# Patient Record
Sex: FEMALE | Race: BLACK OR AFRICAN AMERICAN
[De-identification: names, ages, dates, MRNs, and addresses within clinical notes are randomized per-mention and may not be internally consistent; named-entity substitution may affect disease eponyms.]

---

## 2017-05-12 ENCOUNTER — HOSPITAL ENCOUNTER (INPATIENT)
Dept: HOSPITAL 62 - ER | Age: 57
LOS: 12 days | Discharge: NURSING, REHAB OR LONG-TERM CARE | DRG: 391 | End: 2017-05-24
Attending: INTERNAL MEDICINE | Admitting: INTERNAL MEDICINE
Payer: MEDICARE

## 2017-05-12 DIAGNOSIS — Z79.02: ICD-10-CM

## 2017-05-12 DIAGNOSIS — M06.9: ICD-10-CM

## 2017-05-12 DIAGNOSIS — I50.9: ICD-10-CM

## 2017-05-12 DIAGNOSIS — I11.0: ICD-10-CM

## 2017-05-12 DIAGNOSIS — M32.9: ICD-10-CM

## 2017-05-12 DIAGNOSIS — R07.9: ICD-10-CM

## 2017-05-12 DIAGNOSIS — G93.40: ICD-10-CM

## 2017-05-12 DIAGNOSIS — D64.9: ICD-10-CM

## 2017-05-12 DIAGNOSIS — Z79.899: ICD-10-CM

## 2017-05-12 DIAGNOSIS — E78.1: ICD-10-CM

## 2017-05-12 DIAGNOSIS — A08.4: Primary | ICD-10-CM

## 2017-05-12 DIAGNOSIS — G47.33: ICD-10-CM

## 2017-05-12 DIAGNOSIS — Z79.84: ICD-10-CM

## 2017-05-12 DIAGNOSIS — E11.9: ICD-10-CM

## 2017-05-12 DIAGNOSIS — Z79.891: ICD-10-CM

## 2017-05-12 DIAGNOSIS — Z91.81: ICD-10-CM

## 2017-05-12 DIAGNOSIS — F32.9: ICD-10-CM

## 2017-05-12 DIAGNOSIS — Z83.3: ICD-10-CM

## 2017-05-12 DIAGNOSIS — Z88.6: ICD-10-CM

## 2017-05-12 DIAGNOSIS — R00.0: ICD-10-CM

## 2017-05-12 DIAGNOSIS — E78.5: ICD-10-CM

## 2017-05-12 DIAGNOSIS — E66.01: ICD-10-CM

## 2017-05-12 DIAGNOSIS — R65.10: ICD-10-CM

## 2017-05-12 DIAGNOSIS — H92.02: ICD-10-CM

## 2017-05-12 DIAGNOSIS — G89.29: ICD-10-CM

## 2017-05-12 DIAGNOSIS — Z92.25: ICD-10-CM

## 2017-05-12 DIAGNOSIS — Z82.49: ICD-10-CM

## 2017-05-12 DIAGNOSIS — R59.0: ICD-10-CM

## 2017-05-12 DIAGNOSIS — E86.0: ICD-10-CM

## 2017-05-12 DIAGNOSIS — K50.90: ICD-10-CM

## 2017-05-12 DIAGNOSIS — M79.7: ICD-10-CM

## 2017-05-12 DIAGNOSIS — Z86.73: ICD-10-CM

## 2017-05-12 LAB
ALBUMIN SERPL-MCNC: 4.6 G/DL (ref 3.5–5)
ALP SERPL-CCNC: 83 U/L (ref 38–126)
ALT SERPL-CCNC: 30 U/L (ref 9–52)
ANION GAP SERPL CALC-SCNC: 18 MMOL/L (ref 5–19)
APPEARANCE ALL TUBES: CLEAR
APPEARANCE ALL TUBES: CLEAR
APPEARANCE TUBE1 CSF: CLEAR
APPEARANCE TUBE1 CSF: CLEAR
APPEARANCE TUBE2 CSF: CLEAR
APPEARANCE TUBE2 CSF: CLEAR
APPEARANCE TUBE3 CSF: CLEAR
APPEARANCE TUBE3 CSF: CLEAR
APPEARANCE UR: (no result)
AST SERPL-CCNC: 17 U/L (ref 14–36)
BARBITURATES UR QL SCN: NEGATIVE
BASOPHILS # BLD AUTO: 0.1 10^3/UL (ref 0–0.2)
BASOPHILS NFR BLD AUTO: 0.6 % (ref 0–2)
BILIRUB DIRECT SERPL-MCNC: 0.6 MG/DL (ref 0–0.4)
BILIRUB SERPL-MCNC: 1 MG/DL (ref 0.2–1.3)
BILIRUB UR QL STRIP: NEGATIVE
BUN SERPL-MCNC: 4 MG/DL (ref 7–20)
CALCIUM: 10.4 MG/DL (ref 8.4–10.2)
CHLORIDE SERPL-SCNC: 102 MMOL/L (ref 98–107)
CK MB SERPL-MCNC: 0.28 NG/ML (ref ?–4.55)
CK SERPL-CCNC: 54 U/L (ref 30–135)
CO2 SERPL-SCNC: 24 MMOL/L (ref 22–30)
CREAT SERPL-MCNC: 0.74 MG/DL (ref 0.52–1.25)
EOSINOPHIL # BLD AUTO: 0 10^3/UL (ref 0–0.6)
EOSINOPHIL NFR BLD AUTO: 0 % (ref 0–6)
ERYTHROCYTE [DISTWIDTH] IN BLOOD BY AUTOMATED COUNT: 17.7 % (ref 11.5–14)
GLUCOSE CSF-MCNC: 111 MG/DL (ref 40–70)
GLUCOSE SERPL-MCNC: 174 MG/DL (ref 75–110)
GLUCOSE UR STRIP-MCNC: NEGATIVE MG/DL
HCT VFR BLD CALC: 39.2 % (ref 36–47)
HGB BLD-MCNC: 12.5 G/DL (ref 12–15.5)
HGB HCT DIFFERENCE: -1.7
KETONES UR STRIP-MCNC: 80 MG/DL
LYMPHOCYTES # BLD AUTO: 2.3 10^3/UL (ref 0.5–4.7)
LYMPHOCYTES NFR BLD AUTO: 22 % (ref 13–45)
MCH RBC QN AUTO: 24.1 PG (ref 27–33.4)
MCHC RBC AUTO-ENTMCNC: 32 G/DL (ref 32–36)
MCV RBC AUTO: 75 FL (ref 80–97)
METHADONE UR QL SCN: NEGATIVE
MONOCYTES # BLD AUTO: 1.3 10^3/UL (ref 0.1–1.4)
MONOCYTES NFR BLD AUTO: 11.8 % (ref 3–13)
NEUTROPHILS # BLD AUTO: 7 10^3/UL (ref 1.7–8.2)
NEUTS SEG NFR BLD AUTO: 65.6 % (ref 42–78)
NITRITE UR QL STRIP: NEGATIVE
PCP UR QL SCN: NEGATIVE
PH UR STRIP: 5 [PH] (ref 5–9)
POTASSIUM SERPL-SCNC: 3.6 MMOL/L (ref 3.6–5)
PROT SERPL-MCNC: 8.6 G/DL (ref 6.3–8.2)
PROT UR STRIP-MCNC: 100 MG/DL
RBC # BLD AUTO: 5.21 10^6/UL (ref 3.72–5.28)
RBC AVERAGE: 0
RBC AVERAGE: 93
RBC DILUENT USED: (no result)
RBC DILUENT USED: (no result)
RBC DILUTION FACTOR: 1
RBC DILUTION FACTOR: 1
RBC SIDE 1: 0
RBC SIDE 1: 98
RBC SIDE 2: 0
RBC SIDE 2: 88
SODIUM SERPL-SCNC: 143.7 MMOL/L (ref 137–145)
SP GR UR STRIP: 1.02
TOTAL RBC SQUARES COUNTED: 225
TOTAL RBC SQUARES COUNTED: 225
TROPONIN I SERPL-MCNC: 0.04 NG/ML
URINE OPIATES LOW: (no result)
UROBILINOGEN UR-MCNC: NEGATIVE MG/DL (ref ?–2)
WBC # BLD AUTO: 10.6 10^3/UL (ref 4–10.5)

## 2017-05-12 PROCEDURE — 71010: CPT

## 2017-05-12 PROCEDURE — 93017 CV STRESS TEST TRACING ONLY: CPT

## 2017-05-12 PROCEDURE — 85652 RBC SED RATE AUTOMATED: CPT

## 2017-05-12 PROCEDURE — 78452 HT MUSCLE IMAGE SPECT MULT: CPT

## 2017-05-12 PROCEDURE — 93005 ELECTROCARDIOGRAM TRACING: CPT

## 2017-05-12 PROCEDURE — 96375 TX/PRO/DX INJ NEW DRUG ADDON: CPT

## 2017-05-12 PROCEDURE — A9500 TC99M SESTAMIBI: HCPCS

## 2017-05-12 PROCEDURE — 87045 FECES CULTURE AEROBIC BACT: CPT

## 2017-05-12 PROCEDURE — G0378 HOSPITAL OBSERVATION PER HR: HCPCS

## 2017-05-12 PROCEDURE — 83735 ASSAY OF MAGNESIUM: CPT

## 2017-05-12 PROCEDURE — 80053 COMPREHEN METABOLIC PANEL: CPT

## 2017-05-12 PROCEDURE — 82272 OCCULT BLD FECES 1-3 TESTS: CPT

## 2017-05-12 PROCEDURE — 96365 THER/PROPH/DIAG IV INF INIT: CPT

## 2017-05-12 PROCEDURE — 009U3ZX DRAINAGE OF SPINAL CANAL, PERCUTANEOUS APPROACH, DIAGNOSTIC: ICD-10-PCS | Performed by: EMERGENCY MEDICINE

## 2017-05-12 PROCEDURE — 87040 BLOOD CULTURE FOR BACTERIA: CPT

## 2017-05-12 PROCEDURE — 96361 HYDRATE IV INFUSION ADD-ON: CPT

## 2017-05-12 PROCEDURE — 82550 ASSAY OF CK (CPK): CPT

## 2017-05-12 PROCEDURE — 87205 SMEAR GRAM STAIN: CPT

## 2017-05-12 PROCEDURE — 85025 COMPLETE CBC W/AUTO DIFF WBC: CPT

## 2017-05-12 PROCEDURE — 87070 CULTURE OTHR SPECIMN AEROBIC: CPT

## 2017-05-12 PROCEDURE — 80061 LIPID PANEL: CPT

## 2017-05-12 PROCEDURE — 84484 ASSAY OF TROPONIN QUANT: CPT

## 2017-05-12 PROCEDURE — 84157 ASSAY OF PROTEIN OTHER: CPT

## 2017-05-12 PROCEDURE — 70450 CT HEAD/BRAIN W/O DYE: CPT

## 2017-05-12 PROCEDURE — 84443 ASSAY THYROID STIM HORMONE: CPT

## 2017-05-12 PROCEDURE — 76882 US LMTD JT/FCL EVL NVASC XTR: CPT

## 2017-05-12 PROCEDURE — 82962 GLUCOSE BLOOD TEST: CPT

## 2017-05-12 PROCEDURE — 80307 DRUG TEST PRSMV CHEM ANLYZR: CPT

## 2017-05-12 PROCEDURE — 93306 TTE W/DOPPLER COMPLETE: CPT

## 2017-05-12 PROCEDURE — 89050 BODY FLUID CELL COUNT: CPT

## 2017-05-12 PROCEDURE — 80185 ASSAY OF PHENYTOIN TOTAL: CPT

## 2017-05-12 PROCEDURE — 74177 CT ABD & PELVIS W/CONTRAST: CPT

## 2017-05-12 PROCEDURE — 36415 COLL VENOUS BLD VENIPUNCTURE: CPT

## 2017-05-12 PROCEDURE — 82945 GLUCOSE OTHER FLUID: CPT

## 2017-05-12 PROCEDURE — 93010 ELECTROCARDIOGRAM REPORT: CPT

## 2017-05-12 PROCEDURE — 99292 CRITICAL CARE ADDL 30 MIN: CPT

## 2017-05-12 PROCEDURE — 99291 CRITICAL CARE FIRST HOUR: CPT

## 2017-05-12 PROCEDURE — 81001 URINALYSIS AUTO W/SCOPE: CPT

## 2017-05-12 PROCEDURE — 82553 CREATINE MB FRACTION: CPT

## 2017-05-12 PROCEDURE — 87493 C DIFF AMPLIFIED PROBE: CPT

## 2017-05-12 NOTE — ER DOCUMENT REPORT
ED General





- General


Stated Complaint: ALTERED MENTAL STATUS


Time Seen by Provider: 17 15:26


Mode of Arrival: Medic


Information source: Emergency Med Personnel, [facility] Records


Notes: 


This is a 56-year-old female with a history of CVA, obstructive sleep apnea, 

diabetes, rheumatoid arthritis, diabetes, CHF acute kidney injury, 

rhabdomyolysis.  The patient is brought into the emergency room by EMS because 

of an altered mental status for 2 hours.  EMS reports that home health was at 

the house and that said that the patient was at her baseline until 

approximately 2 hours ago when she became lethargic.  EMS states that the 

patient is actually improved somewhat during transport from when they saw the 

patient.


TRAVEL OUTSIDE OF THE [place] IN LAST 30 DAYS: No





- HPI


Onset: Just prior to arrival


Onset/Duration: Sudden


Quality of pain: No pain


Severity: None


Pain Level: Denies


Associated symptoms: Nausea, Vomiting


Exacerbated by: Denies


Relieved by: Denies


Similar symptoms previously: Yes


Recently seen / treated by doctor: No





- Related Data


Allergies/Adverse Reactions: 


 





ibuprofen [Ibuprofen] Allergy (Severe, Verified 16 14:54)


 Chest pain











Past Medical History





- General


Information source: Transfer Record





- Social History


Smoking Status: Never Smoker


Cigarette use (# per day): No


Chew tobacco use (# tins/day): No


Frequency of alcohol use: None


Drug Abuse: None


Lives with: Alone


Family History: DM, Hypertension


Patient has suicidal ideation: No


Patient has homicidal ideation: No





- Past Medical History


Cardiac Medical History: Reports: Hx Hypercholesterolemia, Hx Hypertension, Hx 

Heart Murmur


   Denies: Hx Atrial Fibrillation, Hx Congestive Heart Failure, Hx Coronary 

Artery Disease, Hx Heart Attack, Hx Peripheral Vascular Disease, Hx Pulmonary 

Embolism


Pulmonary Medical History: Reports: Hx Asthma, Hx Bronchitis, Hx Pneumonia, Hx 

Sleep Apnea


   Denies: Hx COPD, Hx Respiratory Failure, Hx Tuberculosis


Neurological Medical History: Reports: Hx Cerebrovascular Accident - 2004.  

Denies: Hx Seizures


Endocrine Medical History: Reports: Hx Diabetes Mellitus Type 2.  Denies: Hx 

Graves' Disease, Hx Hyperthyroidism, Hx Hypothyroidism


Malignancy Medical History: Denies: Hx Leukemia, Hx Lung Cancer


GI Medical History: Reports: Hx Crohn's Disease, Hx Hiatal Hernia.  Denies: Hx 

Gastroesophageal Reflux Disease, Hx Irritable Bowel, Hx Liver Failure, Hx Ulcer


Musculoskeltal Medical History: Reports Hx Arthritis, Reports Hx Fibromyalgia, 

Denies Hx Multiple Sclerosis, Denies Hx Muscular Dystrophy


Psychiatric Medical History: Reports: Hx Depression


   Denies: Hx Bipolar Disorder, Hx Dementia, Hx Post Traumatic Stress Disorder, 

Hx Schizophrenia


Traumatic Medical History: Denies: Hx Fractures


Infectious Medical History: Denies: Hx HIV


Past Surgical History: Reports: Hx  Section, Hx Hysterectomy.  Denies: 

Hx Appendectomy, Hx Bowel Surgery, Hx Cholecystectomy, Hx Colostomy, Hx 

Coronary Artery Bypass Graft, Hx Gastric Bypass Surgery, Hx Herniorrhaphy, Hx 

Mastectomy, Hx Pacemaker, Hx Tonsillectomy, Hx Tubal Ligation





- Immunizations


Immunizations up to date: Yes


Hx Diphtheria, Pertussis, Tetanus Vaccination: Yes


Hx Pneumococcal Vaccination: 08





Review of Systems





- Review of Systems


Constitutional: denies: Chills, Fever


EENT: No symptoms reported


Cardiovascular: No symptoms reported


Respiratory: No symptoms reported


Gastrointestinal: No symptoms reported


Genitourinary: No symptoms reported


Female Genitourinary: No symptoms reported


Musculoskeletal: No symptoms reported


Skin: No symptoms reported


Hematologic/Lymphatic: No symptoms reported


Neurological/Psychological: See HPI





Physical Exam





- Vital signs


Vitals: 


 











BP


 


 156/108 H


 


 17 14:55











Notes: 


Physical exam:


 


GENERAL: 56-year-old female lying in stretcher, appears lethargic but arousable 

and answering questions.  Blood pressure is 158/90, pulse is 109, O2 sat is 100

% on room air, respiratory rate is 19, rectal temperature is 99.8


HEAD: Atraumatic, normocephalic.


EYES: Pupils equal round and reactive to light, extraocular movements intact, 

sclera anicteric, conjunctiva are normal.


ENT: TMs normal, nares patent, oropharynx clear without exudates.  Moist mucous 

membranes.


NECK: Normal range of motion, supple without lymphadenopathy or JVD.


LUNGS: Breath sounds clear to auscultation bilaterally and equal.  No wheezes 

rales or rhonchi.


HEART: Regular rate and rhythm without murmurs, rubs or gallops.


ABDOMEN: Soft, normoactive bowel sounds.  No tenderness to palpation.  No 

guarding, no rebound.  No masses appreciated.


Back: Clear


Rectal: Brown stool, sent for study


EXTREMITIES: Normal range of motion, no pitting or edema.  No clubbing or 

cyanosis.


NEUROLOGICAL: Cranial nerves II through XII grossly intact.  Normal speech, 

moving all extremities, 2 superficial bruises on the left mid tibia.  No 

evidence of cellulitis.


PSYCH: Normal mood, normal affect.


SKIN: Warm, Dry, normal turgor, no rashes or lesions noted.





Course





- Re-evaluation


Re-evalutation: 


17 18:00


Called to see patient because of worsening mental status.  Patient's heart rate 

noted to be 145.  His appear to be sinus tach on monitor.  Recheck rectal 

temperature and is 101.2.  Chest x-ray is clear, O2 sat is 100% on room air, UA 

is negative.





17 22:27


Note: This is a 56-year-old female with a complicated medical history who is 

immunosuppressed because of her medicines for rheumatoid arthritis (methotrexate

, prednisone) who was brought to the emergency room with acute mental status 

changes.  The patient was known to be tachycardic and her heart rate went up as 

much is 145 and was in sinus at that time.  She did become febrile in the 

emergency room with a rectal temperature of 101.2 she wasn't cephalopathic.  CT 

of the head was clear, chest x-ray was clear, urine analysis was negative.  She 

did have some lesions on her lower extremities that were marked so that it take 

could be followed clinically for any expansion.  The patient does have a 

history of falling, so these may just be bruises.  CT of the abdomen showed no 

acute process.  A spinal tap was performed because of concerns for meningitis 

and is thus far looks negative for infection.  Blood cultures, urine cultures 

and spinal fluid cultures have been sent.  Patient has been given IV fluids, IV 

antibiotics and antiemetics.  Her mental status has improved somewhat but she 

is not fully at baseline.  At this point, there is no obvious source of 

infection.  She does meet criteria for SIRS.  She does not meet establish 

criteria for sepsis at this point.





17 22:31








17 00:02








- Vital Signs


Vital signs: 


 











Temp Pulse Resp BP Pulse Ox


 


       14   133/87 H  100 


 


       17 23:00  17 22:31  17 23:00














- Laboratory


Result Diagrams: 


 17 17:18





 17 17:18


Laboratory results interpreted by me: 


 











  17





  16:27 17:18 17:18


 


WBC   10.6 H 


 


MCV   75 L 


 


MCH   24.1 L 


 


RDW   17.7 H 


 


BUN    4 L


 


Glucose    174 H


 


POC Glucose   


 


Calcium    10.4 H


 


Direct Bilirubin    0.6 H


 


Total Protein    8.6 H


 


Urine Protein  100 H  


 


Urine Ketones  80 H  


 


CSF Glucose   














  17





  17:39 19:05


 


WBC  


 


MCV  


 


MCH  


 


RDW  


 


BUN  


 


Glucose  


 


POC Glucose  192 H 


 


Calcium  


 


Direct Bilirubin  


 


Total Protein  


 


Urine Protein  


 


Urine Ketones  


 


CSF Glucose   111 H














- Diagnostic Test


Radiology reviewed: Image reviewed, Reports reviewed - CT of the head shows no 

acute process. Chest x-ray shows no infiltrates. CT of the abdomen shows no 

acute process.





- EKG Interpretation by Me


Rate: Tachycardia - EKG shows sinus tachycardia with a ventricular rate of 114, 

no acute ST-T wave changes





Procedures





- Lumbar Puncture


  ** Lumbar puncture


Time completed: 20:30


Consent obtained: No - procedure was done emergently to rule out meningitis


Lumbar puncture pre-procedure: Chloraprep applied


Patient position: Sitting


Needle size: 20


Lumbar puncture location: L4-5


Anesthetic type: 1% Lidocaine


mL's of anesthetic: 4


Amount/type of drainage: 4 mL


Number of attempts: 3


Complications: No


Notes: 








Note: Given the patient's acute encephalopathy (Marked mental status changes), 

fever and tachycardia and lack of obvious source of infection (i.e. no pneumonia

, UTI); the spinal tap was done emergently (i.e.  Without written informed 

consent).  The patient was agreeable at the time for the procedure.  The fluid 

looks clear which is good.  It is been sent for study.  





Critical Care Note





- Critical Care Note


Total time excluding time spent on procedures (mins): 90





Discharge





- Discharge


Clinical Impression: 


 acute encephalopathy, SIRS, vomiting





Condition: Stable


Disposition: ADMITTED AS INPATIENT


Admitting Provider: Hospitalist - Dr. Mcclendon


Unit Admitted: Telemetry

## 2017-05-13 RX ADMIN — MAGNESIUM SULFATE IN DEXTROSE SCH ML: 10 INJECTION, SOLUTION INTRAVENOUS at 04:09

## 2017-05-13 RX ADMIN — CELECOXIB SCH MG: 200 CAPSULE ORAL at 11:14

## 2017-05-13 RX ADMIN — CELECOXIB SCH MG: 200 CAPSULE ORAL at 18:12

## 2017-05-13 RX ADMIN — ATORVASTATIN CALCIUM SCH MG: 20 TABLET, FILM COATED ORAL at 18:12

## 2017-05-13 RX ADMIN — METFORMIN HYDROCHLORIDE SCH MG: 500 TABLET, FILM COATED ORAL at 18:11

## 2017-05-13 RX ADMIN — SILVER SULFADIAZINE SCH APPLIC: 10 CREAM TOPICAL at 18:14

## 2017-05-13 RX ADMIN — FUROSEMIDE SCH MG: 80 TABLET ORAL at 11:13

## 2017-05-13 RX ADMIN — FAMOTIDINE SCH MG: 20 TABLET, FILM COATED ORAL at 22:25

## 2017-05-13 RX ADMIN — FOLIC ACID SCH MG: 1 TABLET ORAL at 11:12

## 2017-05-13 RX ADMIN — AMLODIPINE BESYLATE SCH MG: 5 TABLET ORAL at 11:14

## 2017-05-13 RX ADMIN — IPRATROPIUM BROMIDE AND ALBUTEROL SCH PUFF: 20; 100 SPRAY, METERED RESPIRATORY (INHALATION) at 18:14

## 2017-05-13 RX ADMIN — DULOXETINE SCH MG: 30 CAPSULE, DELAYED RELEASE ORAL at 11:16

## 2017-05-13 RX ADMIN — DICYCLOMINE HYDROCHLORIDE SCH MG: 20 TABLET ORAL at 18:00

## 2017-05-13 RX ADMIN — PRENATAL W/O A VIT W/ FE FUMARATE-FA CAP 106.5-1 MG SCH CAP: 106.5 CAPSULE ORAL at 11:16

## 2017-05-13 RX ADMIN — LISINOPRIL SCH MG: 5 TABLET ORAL at 11:15

## 2017-05-13 RX ADMIN — CLOPIDOGREL BISULFATE SCH MG: 75 TABLET, FILM COATED ORAL at 11:16

## 2017-05-13 RX ADMIN — SILVER SULFADIAZINE SCH APPLIC: 10 CREAM TOPICAL at 19:57

## 2017-05-13 RX ADMIN — POTASSIUM CHLORIDE SCH MEQ: 750 TABLET, FILM COATED, EXTENDED RELEASE ORAL at 11:16

## 2017-05-13 RX ADMIN — SPIRONOLACTONE SCH MG: 25 TABLET, FILM COATED ORAL at 11:15

## 2017-05-13 RX ADMIN — PREGABALIN SCH MG: 75 CAPSULE ORAL at 11:15

## 2017-05-13 RX ADMIN — SILVER SULFADIAZINE SCH APPLIC: 10 CREAM TOPICAL at 10:00

## 2017-05-13 RX ADMIN — PREGABALIN SCH MG: 75 CAPSULE ORAL at 22:25

## 2017-05-13 RX ADMIN — MONTELUKAST SODIUM SCH MG: 10 TABLET, FILM COATED ORAL at 18:12

## 2017-05-13 RX ADMIN — METOPROLOL TARTRATE SCH MG: 25 TABLET, FILM COATED ORAL at 22:25

## 2017-05-13 RX ADMIN — EXTENDED PHENYTOIN SODIUM SCH MG: 100 CAPSULE ORAL at 11:15

## 2017-05-13 RX ADMIN — DICYCLOMINE HYDROCHLORIDE SCH MG: 20 TABLET ORAL at 11:17

## 2017-05-13 RX ADMIN — DULOXETINE SCH MG: 30 CAPSULE, DELAYED RELEASE ORAL at 22:26

## 2017-05-13 RX ADMIN — MAGNESIUM SULFATE IN DEXTROSE SCH ML: 10 INJECTION, SOLUTION INTRAVENOUS at 02:39

## 2017-05-13 RX ADMIN — ASPIRIN 325 MG ORAL TABLET SCH MG: 325 PILL ORAL at 11:14

## 2017-05-13 RX ADMIN — CETIRIZINE HYDROCHLORIDE SCH MG: 10 TABLET, FILM COATED ORAL at 18:12

## 2017-05-13 RX ADMIN — DICYCLOMINE HYDROCHLORIDE SCH MG: 20 TABLET ORAL at 14:30

## 2017-05-13 RX ADMIN — EXTENDED PHENYTOIN SODIUM SCH MG: 100 CAPSULE ORAL at 22:25

## 2017-05-13 RX ADMIN — IPRATROPIUM BROMIDE AND ALBUTEROL SCH PUFF: 20; 100 SPRAY, METERED RESPIRATORY (INHALATION) at 12:00

## 2017-05-13 NOTE — PDOC PROGRESS REPORT
Subjective


Progress Note for:: 05/13/17


Subjective:: 


Patient seen on morning rounds.  She is presently sitting in bedside chair.  

She states her stomach doesn't feel well.  She states she's been nauseated 

since yesterday.  She denies any vomiting, she has had several episodes of 

diarrhea.  She denies any recent antibiotic use at home.  She states she has no 

other family members were sick at the present time.  She denies any significant 

abdominal pain at the present time.  She denies any chest pain, shortness 

breath or dyspnea.  She has had a low-grade fever on and off.  She is back to 

her baseline mental status.  Rest of review systems is negative.





Physical Exam


Vital Signs: 


 











Temp Pulse Resp BP Pulse Ox


 


 99.3 F   136 H  26 H  157/86 H  97 


 


 05/13/17 07:53  05/13/17 07:53  05/13/17 07:53  05/13/17 07:53  05/13/17 07:53








 Intake & Output











 05/12/17 05/13/17 05/14/17





 06:59 06:59 06:59


 


Intake Total  170 


 


Balance  170 


 


Weight  137.6 kg 











General appearance: PRESENT: no acute distress, morbidly obese, well-developed, 

well-nourished


Head exam: PRESENT: atraumatic, normocephalic


Eye exam: PRESENT: conjunctiva pink, EOMI, PERRLA.  ABSENT: scleral icterus


Ear exam: PRESENT: normal external ear exam


Neck exam: ABSENT: carotid bruit, JVD, lymphadenopathy, thyromegaly


Respiratory exam: PRESENT: clear to auscultation kaley.  ABSENT: rales, rhonchi, 

wheezes


Cardiovascular exam: PRESENT: RRR.  ABSENT: diastolic murmur, rubs, systolic 

murmur


Vascular exam: PRESENT: normal capillary refill


GI/Abdominal exam: PRESENT: normal bowel sounds, soft.  ABSENT: distended, 

guarding, mass, organolmegaly, rebound, tenderness


Rectal exam: PRESENT: deferred


Extremities exam: PRESENT: full ROM.  ABSENT: calf tenderness, clubbing, pedal 

edema


Musculoskeletal exam: PRESENT: full ROM, normal inspection, tenderness


Neurological exam: PRESENT: alert, awake, oriented to person, oriented to place

, oriented to time, oriented to situation, CN II-XII grossly intact.  ABSENT: 

motor sensory deficit


Psychiatric exam: PRESENT: appropriate affect, normal mood.  ABSENT: homicidal 

ideation, suicidal ideation


Skin exam: PRESENT: dry, intact, warm.  ABSENT: cyanosis, rash





Results


Impressions: 


 





Head CT  05/12/17 00:00


IMPRESSION:  NORMAL BRAIN CT WITHOUT CONTRAST.


 








Chest X-Ray  05/12/17 15:37


IMPRESSION:  NO ACUTE RADIOGRAPHIC FINDING IN THE CHEST.


 








Abdomen/Pelvis CT  05/12/17 21:06


IMPRESSION:  NO ACUTE FINDINGS WITHIN THE ABDOMEN OR PELVIS.  NO SIGNIFICANT 

CHANGE FROM PRIOR STUDY.


 














Assessment & Plan





- Diagnosis


(1) Encephalopathy acute


Is this a current diagnosis for this admission?: YesPlan: 


Since resolved.  He PE secondary to infectious process versus chronic use of 

opioid analgesics











(3) Chronic prescription opiate use


Is this a current diagnosis for this admission?: YesPlan: 


Continue pain management prescribed analgesic dose








(4) Diabetes mellitus


Qualifiers: 


     Diabetes mellitus type: type 2     Diabetes mellitus complication status: 

with unspecified complications     Diabetes mellitus long term insulin use: 

unspecified long term insulin use status        Qualified Code(s): E11.8 - Type 

2 diabetes mellitus with unspecified complications; Z79.4 - Long term (current) 

use of insulin  





(5) Morbid obesity with BMI of 50.0-59.9, adult


Is this a current diagnosis for this admission?: YesPlan: 


Counseled








(6) GOSIA (obstructive sleep apnea)


Is this a current diagnosis for this admission?: YesPlan: 


CPAP at night











- Time


Time Spent with patient: 25-34 minutes


Critical Time spent with patient: 15-24 minutes


Medications reviewed and adjusted accordingly: Yes


Anticipated discharge: Home with Homehealth


Within: within 24 hours





- Inpatient Certification


Based on my medical assessment, after consideration of the patient's 

comorbidities, presenting symptoms, or acuity I expect that the services needed 

warrant INPATIENT care.: Yes


I certify that my determination is in accordance with my understanding of 

Medicare's requirements for reasonable and necessary INPATIENT services [42 CFR 

412.3e].: Yes

## 2017-05-13 NOTE — EKG REPORT
SEVERITY:- ABNORMAL ECG -

SINUS TACHYCARDIA

PROBABLE POSTERIOR INFARCT

BORDERLINE PROLONGED QT INTERVAL

:

Confirmed by: Dez Fabian 13-May-2017 13:18:29

## 2017-05-14 RX ADMIN — IPRATROPIUM BROMIDE AND ALBUTEROL SCH PUFF: 20; 100 SPRAY, METERED RESPIRATORY (INHALATION) at 11:16

## 2017-05-14 RX ADMIN — KETOROLAC TROMETHAMINE PRN MG: 10 TABLET, FILM COATED ORAL at 17:29

## 2017-05-14 RX ADMIN — EXTENDED PHENYTOIN SODIUM SCH MG: 100 CAPSULE ORAL at 09:10

## 2017-05-14 RX ADMIN — EXTENDED PHENYTOIN SODIUM SCH MG: 100 CAPSULE ORAL at 14:10

## 2017-05-14 RX ADMIN — LISINOPRIL SCH MG: 5 TABLET ORAL at 09:12

## 2017-05-14 RX ADMIN — POTASSIUM CHLORIDE SCH MEQ: 750 TABLET, FILM COATED, EXTENDED RELEASE ORAL at 09:11

## 2017-05-14 RX ADMIN — ASPIRIN 325 MG ORAL TABLET SCH MG: 325 PILL ORAL at 09:10

## 2017-05-14 RX ADMIN — IPRATROPIUM BROMIDE AND ALBUTEROL SCH PUFF: 20; 100 SPRAY, METERED RESPIRATORY (INHALATION) at 17:28

## 2017-05-14 RX ADMIN — EXTENDED PHENYTOIN SODIUM SCH MG: 100 CAPSULE ORAL at 23:16

## 2017-05-14 RX ADMIN — SILVER SULFADIAZINE SCH APPLIC: 10 CREAM TOPICAL at 09:24

## 2017-05-14 RX ADMIN — IPRATROPIUM BROMIDE AND ALBUTEROL SCH PUFF: 20; 100 SPRAY, METERED RESPIRATORY (INHALATION) at 23:16

## 2017-05-14 RX ADMIN — FUROSEMIDE SCH MG: 80 TABLET ORAL at 09:27

## 2017-05-14 RX ADMIN — SPIRONOLACTONE SCH MG: 25 TABLET, FILM COATED ORAL at 09:12

## 2017-05-14 RX ADMIN — METFORMIN HYDROCHLORIDE SCH MG: 500 TABLET, FILM COATED ORAL at 09:10

## 2017-05-14 RX ADMIN — PREDNISONE SCH MG: 10 TABLET ORAL at 09:11

## 2017-05-14 RX ADMIN — METOPROLOL SUCCINATE SCH MG: 50 TABLET, EXTENDED RELEASE ORAL at 23:15

## 2017-05-14 RX ADMIN — METFORMIN HYDROCHLORIDE SCH MG: 500 TABLET, FILM COATED ORAL at 17:27

## 2017-05-14 RX ADMIN — MONTELUKAST SODIUM SCH MG: 10 TABLET, FILM COATED ORAL at 17:28

## 2017-05-14 RX ADMIN — DULOXETINE SCH MG: 30 CAPSULE, DELAYED RELEASE ORAL at 23:15

## 2017-05-14 RX ADMIN — CLOPIDOGREL BISULFATE SCH MG: 75 TABLET, FILM COATED ORAL at 09:09

## 2017-05-14 RX ADMIN — DULOXETINE SCH MG: 30 CAPSULE, DELAYED RELEASE ORAL at 09:11

## 2017-05-14 RX ADMIN — DICYCLOMINE HYDROCHLORIDE SCH MG: 20 TABLET ORAL at 13:18

## 2017-05-14 RX ADMIN — METOPROLOL TARTRATE SCH MG: 25 TABLET, FILM COATED ORAL at 09:22

## 2017-05-14 RX ADMIN — IPRATROPIUM BROMIDE AND ALBUTEROL SCH PUFF: 20; 100 SPRAY, METERED RESPIRATORY (INHALATION) at 01:26

## 2017-05-14 RX ADMIN — CETIRIZINE HYDROCHLORIDE SCH MG: 10 TABLET, FILM COATED ORAL at 17:28

## 2017-05-14 RX ADMIN — IPRATROPIUM BROMIDE AND ALBUTEROL SCH PUFF: 20; 100 SPRAY, METERED RESPIRATORY (INHALATION) at 05:21

## 2017-05-14 RX ADMIN — DICYCLOMINE HYDROCHLORIDE SCH MG: 20 TABLET ORAL at 09:13

## 2017-05-14 RX ADMIN — PREGABALIN SCH MG: 75 CAPSULE ORAL at 23:16

## 2017-05-14 RX ADMIN — SILVER SULFADIAZINE SCH APPLIC: 10 CREAM TOPICAL at 17:23

## 2017-05-14 RX ADMIN — CELECOXIB SCH MG: 200 CAPSULE ORAL at 09:10

## 2017-05-14 RX ADMIN — FOLIC ACID SCH MG: 1 TABLET ORAL at 09:10

## 2017-05-14 RX ADMIN — PREGABALIN SCH MG: 75 CAPSULE ORAL at 09:10

## 2017-05-14 RX ADMIN — PRENATAL W/O A VIT W/ FE FUMARATE-FA CAP 106.5-1 MG SCH CAP: 106.5 CAPSULE ORAL at 09:07

## 2017-05-14 RX ADMIN — DICYCLOMINE HYDROCHLORIDE SCH MG: 20 TABLET ORAL at 17:28

## 2017-05-14 RX ADMIN — FAMOTIDINE SCH MG: 20 TABLET, FILM COATED ORAL at 23:16

## 2017-05-14 RX ADMIN — SILVER SULFADIAZINE SCH APPLIC: 10 CREAM TOPICAL at 13:18

## 2017-05-14 RX ADMIN — ATORVASTATIN CALCIUM SCH MG: 20 TABLET, FILM COATED ORAL at 17:28

## 2017-05-14 RX ADMIN — DEXAMETHASONE SODIUM PHOSPHATE PRN MG: 10 INJECTION INTRAMUSCULAR; INTRAVENOUS at 11:32

## 2017-05-14 NOTE — PDOC PROGRESS REPORT
Subjective


Progress Note for:: 05/14/17


Subjective:: 


Patient seen on morning rounds.  She is sitting on the side of the bed eating 

breakfast.   She states her stomach doesn't feel well.  She states she's been 

nauseated since yesterday.  She denies any vomiting, she states she has had 

several episodes of diarrhea.  She denies any recent antibiotic use at home.  

She states she has no other family members were sick at the present time.  She 

denies any significant abdominal pain at the present time.  She denies any 

chest pain, shortness breath or dyspnea.  She has had a low-grade fever on and 

off. Her heart rate has been elevated this morning in the 140's with systolic 

blood pressure in the 90's. She states she just does not feel well.  She is 

back to her baseline mental status.  Rest of review systems is negative.





Physical Exam


Vital Signs: 


 











Temp Pulse Resp BP Pulse Ox


 


 99.4 F   132 H  18   142/86 H  96 


 


 05/14/17 03:49  05/14/17 11:51  05/14/17 11:51  05/14/17 11:51  05/14/17 11:51








 Intake & Output











 05/13/17 05/14/17 05/15/17





 06:59 06:59 06:59


 


Intake Total 170 491 


 


Output Total  630 


 


Balance 170 -139 


 


Weight 137.6 kg  














Results


Impressions: 


 





Head CT  05/12/17 00:00


IMPRESSION:  NORMAL BRAIN CT WITHOUT CONTRAST.


 








Chest X-Ray  05/12/17 15:37


IMPRESSION:  NO ACUTE RADIOGRAPHIC FINDING IN THE CHEST.


 








Abdomen/Pelvis CT  05/12/17 21:06


IMPRESSION:  NO ACUTE FINDINGS WITHIN THE ABDOMEN OR PELVIS.  NO SIGNIFICANT 

CHANGE FROM PRIOR STUDY.


 














Assessment & Plan





- Diagnosis


(1) SIRS (systemic inflammatory response syndrome)


Is this a current diagnosis for this admission?: YesPlan: 


No obvious source of infection, may be viral gastroenteritis








(2) Tachycardia


Plan: 


Patient has been borderline hypotensive, blood pressure medications have been 

held or given in a reduced dosage, most likely secondary to dehydration from 

diarrhea. Gently rehydrating with IV fluids. Will check stool for Cdiff and 

other organisms. Patient given digoxin 0.5 mg IV and fluid bolus. Will hold 

other BP med but give metoprolol. 








(3) Encephalopathy acute


Is this a current diagnosis for this admission?: YesPlan: 





Since resolved.  This may be secondary to infectious process versus chronic use 

of opioid analgesics








(4) Nausea


Is this a current diagnosis for this admission?: YesPlan: 


Improved since yesterday,








(5) Chronic prescription opiate use


Is this a current diagnosis for this admission?: YesPlan: 


Continue pain management prescribed analgesic dose








(6) Diabetes mellitus


Qualifiers: 


     Diabetes mellitus type: type 2     Diabetes mellitus complication status: 

with unspecified complications     Diabetes mellitus long term insulin use: 

unspecified long term insulin use status        Qualified Code(s): E11.8 - Type 

2 diabetes mellitus with unspecified complications; Z79.4 - Long term (current) 

use of insulin  


Is this a current diagnosis for this admission?: YesPlan: 


Continue current home medication and sliding scale coverage








(7) Morbid obesity with BMI of 50.0-59.9, adult


Is this a current diagnosis for this admission?: YesPlan: 


Counseled








(8) GOSIA (obstructive sleep apnea)


Is this a current diagnosis for this admission?: YesPlan: 


CPAP at night








(9) Diarrhea


Qualifiers: 


     Diarrhea type: unspecified type        Qualified Code(s): R19.7 - Diarrhea

, unspecified  


Is this a current diagnosis for this admission?: YesPlan: 


Will send stool for cdiff and culture











- Time


Time Spent with patient: 25-34 minutes


Smoking Cessation Education: 3 to 10 minutes


Medications reviewed and adjusted accordingly: Yes


Anticipated discharge: Home with Homehealth

## 2017-05-15 RX ADMIN — MONTELUKAST SODIUM SCH MG: 10 TABLET, FILM COATED ORAL at 18:00

## 2017-05-15 RX ADMIN — SILVER SULFADIAZINE SCH APPLIC: 10 CREAM TOPICAL at 18:00

## 2017-05-15 RX ADMIN — FUROSEMIDE SCH MG: 80 TABLET ORAL at 10:44

## 2017-05-15 RX ADMIN — SILVER SULFADIAZINE SCH APPLIC: 10 CREAM TOPICAL at 15:31

## 2017-05-15 RX ADMIN — KETOROLAC TROMETHAMINE PRN MG: 10 TABLET, FILM COATED ORAL at 20:05

## 2017-05-15 RX ADMIN — PREDNISONE SCH MG: 10 TABLET ORAL at 10:44

## 2017-05-15 RX ADMIN — PREGABALIN SCH MG: 75 CAPSULE ORAL at 10:41

## 2017-05-15 RX ADMIN — IPRATROPIUM BROMIDE AND ALBUTEROL SCH PUFF: 20; 100 SPRAY, METERED RESPIRATORY (INHALATION) at 12:52

## 2017-05-15 RX ADMIN — METOPROLOL SUCCINATE SCH MG: 50 TABLET, EXTENDED RELEASE ORAL at 10:43

## 2017-05-15 RX ADMIN — POTASSIUM CHLORIDE SCH MEQ: 750 TABLET, FILM COATED, EXTENDED RELEASE ORAL at 10:40

## 2017-05-15 RX ADMIN — IPRATROPIUM BROMIDE AND ALBUTEROL SCH PUFF: 20; 100 SPRAY, METERED RESPIRATORY (INHALATION) at 18:11

## 2017-05-15 RX ADMIN — SILVER SULFADIAZINE SCH APPLIC: 10 CREAM TOPICAL at 10:46

## 2017-05-15 RX ADMIN — CLOPIDOGREL BISULFATE SCH MG: 75 TABLET, FILM COATED ORAL at 10:41

## 2017-05-15 RX ADMIN — CETIRIZINE HYDROCHLORIDE SCH MG: 10 TABLET, FILM COATED ORAL at 17:59

## 2017-05-15 RX ADMIN — PRENATAL W/O A VIT W/ FE FUMARATE-FA CAP 106.5-1 MG SCH CAP: 106.5 CAPSULE ORAL at 10:40

## 2017-05-15 RX ADMIN — PREGABALIN SCH MG: 75 CAPSULE ORAL at 22:11

## 2017-05-15 RX ADMIN — IPRATROPIUM BROMIDE AND ALBUTEROL SCH PUFF: 20; 100 SPRAY, METERED RESPIRATORY (INHALATION) at 05:39

## 2017-05-15 RX ADMIN — FAMOTIDINE SCH MG: 20 TABLET, FILM COATED ORAL at 22:11

## 2017-05-15 RX ADMIN — ATORVASTATIN CALCIUM SCH MG: 20 TABLET, FILM COATED ORAL at 18:00

## 2017-05-15 RX ADMIN — EXTENDED PHENYTOIN SODIUM SCH MG: 100 CAPSULE ORAL at 05:38

## 2017-05-15 RX ADMIN — DULOXETINE SCH MG: 30 CAPSULE, DELAYED RELEASE ORAL at 22:11

## 2017-05-15 RX ADMIN — EXTENDED PHENYTOIN SODIUM SCH MG: 100 CAPSULE ORAL at 22:10

## 2017-05-15 RX ADMIN — METOPROLOL SUCCINATE SCH MG: 50 TABLET, EXTENDED RELEASE ORAL at 22:10

## 2017-05-15 RX ADMIN — DICYCLOMINE HYDROCHLORIDE SCH MG: 20 TABLET ORAL at 18:00

## 2017-05-15 RX ADMIN — SPIRONOLACTONE SCH MG: 25 TABLET, FILM COATED ORAL at 10:42

## 2017-05-15 RX ADMIN — METFORMIN HYDROCHLORIDE SCH MG: 500 TABLET, FILM COATED ORAL at 10:41

## 2017-05-15 RX ADMIN — AMLODIPINE BESYLATE SCH MG: 5 TABLET ORAL at 10:43

## 2017-05-15 RX ADMIN — ASPIRIN 325 MG ORAL TABLET SCH MG: 325 PILL ORAL at 10:41

## 2017-05-15 RX ADMIN — METFORMIN HYDROCHLORIDE SCH MG: 500 TABLET, FILM COATED ORAL at 15:33

## 2017-05-15 RX ADMIN — DICYCLOMINE HYDROCHLORIDE SCH MG: 20 TABLET ORAL at 10:46

## 2017-05-15 RX ADMIN — FOLIC ACID SCH MG: 1 TABLET ORAL at 10:41

## 2017-05-15 RX ADMIN — ALBUTEROL SULFATE PRN PUFF: 90 AEROSOL, METERED RESPIRATORY (INHALATION) at 18:06

## 2017-05-15 RX ADMIN — DEXAMETHASONE SODIUM PHOSPHATE PRN MG: 10 INJECTION INTRAMUSCULAR; INTRAVENOUS at 17:59

## 2017-05-15 RX ADMIN — DICYCLOMINE HYDROCHLORIDE SCH MG: 20 TABLET ORAL at 15:31

## 2017-05-15 RX ADMIN — DULOXETINE SCH MG: 30 CAPSULE, DELAYED RELEASE ORAL at 10:41

## 2017-05-15 RX ADMIN — EXTENDED PHENYTOIN SODIUM SCH MG: 100 CAPSULE ORAL at 15:31

## 2017-05-15 RX ADMIN — KETOROLAC TROMETHAMINE PRN MG: 10 TABLET, FILM COATED ORAL at 05:38

## 2017-05-15 NOTE — PDOC PROGRESS REPORT
Subjective


Progress Note for:: 05/15/17


Subjective:: 


Patient seen on morning rounds.  She is sitting in bedside chair eating 

breakfast.  Her daughter is at the bedside as well.  She states she feels 

better than she did yesterday.  She's had no further nausea or vomiting.  She 

has had 3 episodes of diarrhea.  She has had no further tachycardia or 

hypotension.  She states she does not think she is able to go home with home 

health.  She thinks she needs to go to rehabilitation for further physical 

therapy.  Rest of review systems is negative.





Physical Exam


Vital Signs: 


 











Temp Pulse Resp BP Pulse Ox


 


 99.1 F   89   19   121/64   98 


 


 05/15/17 04:01  05/15/17 04:01  05/15/17 04:01  05/15/17 04:01  05/15/17 04:01








 Intake & Output











 05/14/17 05/15/17 05/16/17





 06:59 06:59 06:59


 


Intake Total  1235 


 


Balance  1235 











General appearance: PRESENT: no acute distress, morbidly obese, well-developed, 

well-nourished


Head exam: PRESENT: atraumatic, normocephalic


Eye exam: PRESENT: conjunctiva pink, EOMI, PERRLA.  ABSENT: scleral icterus


Ear exam: PRESENT: normal external ear exam


Mouth exam: PRESENT: moist, tongue midline


Neck exam: ABSENT: carotid bruit, JVD, lymphadenopathy, thyromegaly


Respiratory exam: PRESENT: clear to auscultation kaley.  ABSENT: rales, rhonchi, 

wheezes


Cardiovascular exam: PRESENT: RRR.  ABSENT: diastolic murmur, rubs, systolic 

murmur


Pulses: PRESENT: normal dorsalis pedis pul


Vascular exam: PRESENT: normal capillary refill


GI/Abdominal exam: PRESENT: normal bowel sounds, soft.  ABSENT: distended, 

guarding, mass, organolmegaly, rebound, tenderness


Rectal exam: PRESENT: deferred


Extremities exam: PRESENT: full ROM.  ABSENT: calf tenderness, clubbing, pedal 

edema


Musculoskeletal exam: PRESENT: ambulatory, full ROM, normal inspection


Neurological exam: PRESENT: alert, awake, oriented to person, oriented to place

, oriented to time, oriented to situation, CN II-XII grossly intact.  ABSENT: 

motor sensory deficit


Psychiatric exam: PRESENT: flat affect


Skin exam: PRESENT: abrasion





Results


Impressions: 


 





Head CT  05/12/17 00:00


IMPRESSION:  NORMAL BRAIN CT WITHOUT CONTRAST.


 








Chest X-Ray  05/12/17 15:37


IMPRESSION:  NO ACUTE RADIOGRAPHIC FINDING IN THE CHEST.


 








Abdomen/Pelvis CT  05/12/17 21:06


IMPRESSION:  NO ACUTE FINDINGS WITHIN THE ABDOMEN OR PELVIS.  NO SIGNIFICANT 

CHANGE FROM PRIOR STUDY.


 














Assessment & Plan





- Diagnosis


(1) SIRS (systemic inflammatory response syndrome)


Is this a current diagnosis for this admission?: YesPlan: 





Tachycardia, low grade fever and hypotension likely secondary to viral 

gastroenteritis








(2) Tachycardia


Plan: 


Resolved








(3) Encephalopathy acute


Is this a current diagnosis for this admission?: YesPlan: 


Resolved








(4) Nausea


Is this a current diagnosis for this admission?: YesPlan: 


Resolved








(5) Chronic prescription opiate use


Is this a current diagnosis for this admission?: YesPlan: 


Continue pain management prescribed analgesic dose








(6) Diabetes mellitus


Qualifiers: 


     Diabetes mellitus type: type 2     Diabetes mellitus complication status: 

with unspecified complications     Diabetes mellitus long term insulin use: 

unspecified long term insulin use status        Qualified Code(s): E11.8 - Type 

2 diabetes mellitus with unspecified complications; Z79.4 - Long term (current) 

use of insulin  


Is this a current diagnosis for this admission?: YesPlan: 


Continue current home medication and sliding scale coverage








(7) Morbid obesity with BMI of 50.0-59.9, adult


Is this a current diagnosis for this admission?: YesPlan: 


Counseled








(8) GOSIA (obstructive sleep apnea)


Is this a current diagnosis for this admission?: YesPlan: 


CPAP at night








(9) Diarrhea


Qualifiers: 


     Diarrhea type: unspecified type        Qualified Code(s): R19.7 - Diarrhea

, unspecified  


Is this a current diagnosis for this admission?: YesPlan: 


Will send stool for cdiff and culture











- Time


Time Spent with patient: 25-34 minutes


Critical Time spent with patient: 15-24 minutes


Medications reviewed and adjusted accordingly: Yes


Anticipated discharge: Home with Homehealth, Acute Rehab





- Inpatient Certification


Based on my medical assessment, after consideration of the patient's 

comorbidities, presenting symptoms, or acuity I expect that the services needed 

warrant INPATIENT care.: Yes

## 2017-05-16 LAB
CHOLEST SERPL-MCNC: 196.43 MG/DL (ref 0–200)
DIRECT HDL: 36 MG/DL (ref 40–?)
LDLC SERPL DIRECT ASSAY-MCNC: 112 MG/DL (ref ?–100)
TRIGL SERPL-MCNC: 246 MG/DL (ref ?–150)
VLDLC SERPL CALC-MCNC: 49.2 MG/DL (ref 10–31)

## 2017-05-16 RX ADMIN — ASPIRIN 325 MG ORAL TABLET SCH MG: 325 PILL ORAL at 09:28

## 2017-05-16 RX ADMIN — POTASSIUM CHLORIDE SCH MEQ: 750 TABLET, FILM COATED, EXTENDED RELEASE ORAL at 09:28

## 2017-05-16 RX ADMIN — PRENATAL W/O A VIT W/ FE FUMARATE-FA CAP 106.5-1 MG SCH CAP: 106.5 CAPSULE ORAL at 09:28

## 2017-05-16 RX ADMIN — FUROSEMIDE SCH MG: 80 TABLET ORAL at 09:28

## 2017-05-16 RX ADMIN — LISINOPRIL SCH MG: 5 TABLET ORAL at 09:29

## 2017-05-16 RX ADMIN — METFORMIN HYDROCHLORIDE SCH MG: 500 TABLET, FILM COATED ORAL at 09:28

## 2017-05-16 RX ADMIN — METOPROLOL SUCCINATE SCH MG: 50 TABLET, EXTENDED RELEASE ORAL at 23:00

## 2017-05-16 RX ADMIN — PREDNISONE SCH MG: 10 TABLET ORAL at 09:27

## 2017-05-16 RX ADMIN — FAMOTIDINE SCH MG: 20 TABLET, FILM COATED ORAL at 23:00

## 2017-05-16 RX ADMIN — MONTELUKAST SODIUM SCH MG: 10 TABLET, FILM COATED ORAL at 17:30

## 2017-05-16 RX ADMIN — DULOXETINE SCH MG: 30 CAPSULE, DELAYED RELEASE ORAL at 09:28

## 2017-05-16 RX ADMIN — PREGABALIN SCH MG: 75 CAPSULE ORAL at 09:28

## 2017-05-16 RX ADMIN — SILVER SULFADIAZINE SCH APPLIC: 10 CREAM TOPICAL at 10:22

## 2017-05-16 RX ADMIN — EXTENDED PHENYTOIN SODIUM SCH MG: 100 CAPSULE ORAL at 05:13

## 2017-05-16 RX ADMIN — EXTENDED PHENYTOIN SODIUM SCH MG: 100 CAPSULE ORAL at 23:00

## 2017-05-16 RX ADMIN — IPRATROPIUM BROMIDE AND ALBUTEROL SCH PUFF: 20; 100 SPRAY, METERED RESPIRATORY (INHALATION) at 17:31

## 2017-05-16 RX ADMIN — EXTENDED PHENYTOIN SODIUM SCH MG: 100 CAPSULE ORAL at 14:48

## 2017-05-16 RX ADMIN — PREGABALIN SCH MG: 75 CAPSULE ORAL at 23:00

## 2017-05-16 RX ADMIN — ATORVASTATIN CALCIUM SCH MG: 20 TABLET, FILM COATED ORAL at 17:31

## 2017-05-16 RX ADMIN — SILVER SULFADIAZINE SCH APPLIC: 10 CREAM TOPICAL at 14:46

## 2017-05-16 RX ADMIN — METFORMIN HYDROCHLORIDE SCH MG: 500 TABLET, FILM COATED ORAL at 17:31

## 2017-05-16 RX ADMIN — IPRATROPIUM BROMIDE AND ALBUTEROL SCH PUFF: 20; 100 SPRAY, METERED RESPIRATORY (INHALATION) at 12:33

## 2017-05-16 RX ADMIN — SILVER SULFADIAZINE SCH APPLIC: 10 CREAM TOPICAL at 17:20

## 2017-05-16 RX ADMIN — SPIRONOLACTONE SCH MG: 25 TABLET, FILM COATED ORAL at 09:29

## 2017-05-16 RX ADMIN — METOPROLOL SUCCINATE SCH MG: 50 TABLET, EXTENDED RELEASE ORAL at 09:28

## 2017-05-16 RX ADMIN — AMLODIPINE BESYLATE SCH MG: 5 TABLET ORAL at 09:28

## 2017-05-16 RX ADMIN — FOLIC ACID SCH MG: 1 TABLET ORAL at 09:28

## 2017-05-16 RX ADMIN — IPRATROPIUM BROMIDE AND ALBUTEROL SCH PUFF: 20; 100 SPRAY, METERED RESPIRATORY (INHALATION) at 23:18

## 2017-05-16 RX ADMIN — CLOPIDOGREL BISULFATE SCH MG: 75 TABLET, FILM COATED ORAL at 09:28

## 2017-05-16 RX ADMIN — DULOXETINE SCH MG: 30 CAPSULE, DELAYED RELEASE ORAL at 22:59

## 2017-05-16 RX ADMIN — IPRATROPIUM BROMIDE AND ALBUTEROL SCH PUFF: 20; 100 SPRAY, METERED RESPIRATORY (INHALATION) at 05:14

## 2017-05-16 NOTE — PDOC PROGRESS REPORT
Subjective


Progress Note for:: 05/16/17


Subjective:: 


The patient was seen earlier today on rounds.  The patient states that she has 

had some pain chest pain.  The patient admits to waking up short of breath.  

The patient denies any nausea, vomiting, diarrhea, , dizziness,  heart 

palpitations, fevers, or chills.  The patient has remained afebrile.  Blood 

pressures have been in a good range.  When prompted the patient voices no other 

concerns at this time.  





Review of systems: The rest of the review of systems is negative.





Physical Exam


Vital Signs: 


 











Temp Pulse Resp BP Pulse Ox


 


 98.1 F   88   19   138/77 H  94 


 


 05/16/17 11:07  05/16/17 11:07  05/16/17 11:07  05/16/17 11:07  05/16/17 11:07








 Intake & Output











 05/14/17 05/15/17 05/16/17





 23:59 23:59 23:59


 


Intake Total 1010 705 720


 


Output Total   200


 


Balance 1010 705 520











General appearance: PRESENT: no acute distress, cooperative, morbidly obese, 

well-developed


Head exam: PRESENT: atraumatic, normocephalic


Eye exam: PRESENT: conjunctiva pink, EOMI, PERRLA.  ABSENT: scleral icterus


Ear exam: PRESENT: normal external ear exam


Mouth exam: PRESENT: moist, tongue midline


Neck exam: ABSENT: carotid bruit, JVD, lymphadenopathy, thyromegaly


Respiratory exam: PRESENT: clear to auscultation kaley.  ABSENT: rales, rhonchi, 

wheezes


Cardiovascular exam: PRESENT: RRR.  ABSENT: diastolic murmur, rubs, systolic 

murmur


Pulses: PRESENT: normal dorsalis pedis pul


Vascular exam: PRESENT: normal capillary refill


GI/Abdominal exam: PRESENT: normal bowel sounds, soft.  ABSENT: distended, 

guarding, mass, organolmegaly, rebound, tenderness


Rectal exam: PRESENT: deferred


Extremities exam: PRESENT: full ROM.  ABSENT: calf tenderness, clubbing, pedal 

edema


Neurological exam: PRESENT: alert, awake, oriented to person, oriented to place

, oriented to time, oriented to situation, CN II-XII grossly intact.  ABSENT: 

motor sensory deficit


Psychiatric exam: PRESENT: appropriate affect, normal mood.  ABSENT: homicidal 

ideation, suicidal ideation


Skin exam: PRESENT: dry, intact, warm.  ABSENT: cyanosis, rash





Results


Laboratory Results: 


 











  05/15/17 05/16/17 05/16/17





  20:45 03:10 09:26


 


Troponin I  0.022  0.023  0.017








 Labs- Last Values











WBC  10.6 10^3/uL (4.0-10.5)  H  05/12/17  17:18    


 


RBC  5.21 10^6/uL (3.72-5.28)   05/12/17  17:18    


 


Hgb  12.5 g/dL (12.0-15.5)   05/12/17  17:18    


 


Hct  39.2 % (36.0-47.0)   05/12/17  17:18    


 


MCV  75 fl (80-97)  L  05/12/17  17:18    


 


MCH  24.1 pg (27.0-33.4)  L  05/12/17  17:18    


 


MCHC  32.0 g/dL (32.0-36.0)   05/12/17  17:18    


 


RDW  17.7 % (11.5-14.0)  H  05/12/17  17:18    


 


Plt Count  277 10^3/uL (150-450)   05/12/17  17:18    


 


Seg Neutrophils %  65.6 % (42-78)   05/12/17  17:18    


 


Lymphocytes %  22.0 % (13-45)   05/12/17  17:18    


 


Monocytes %  11.8 % (3-13)   05/12/17  17:18    


 


Eosinophils %  0.0 % (0-6)   05/12/17  17:18    


 


Basophils %  0.6 % (0-2)   05/12/17  17:18    


 


Absolute Neutrophils  7.0 10^3/uL (1.7-8.2)   05/12/17  17:18    


 


Absolute Lymphocytes  2.3 10^3/uL (0.5-4.7)   05/12/17  17:18    


 


Absolute Monocytes  1.3 10^3/uL (0.1-1.4)   05/12/17  17:18    


 


Absolute Eosinophils  0.0 10^3/uL (0.0-0.6)   05/12/17  17:18    


 


Absolute Basophils  0.1 10^3/uL (0.0-0.2)   05/12/17  17:18    


 


ESR  20 mm/hr (0-30)   05/13/17  05:35    


 


Sodium  143.7 mmol/L (137-145)   05/12/17  17:18    


 


Potassium  3.6 mmol/L (3.6-5.0)   05/12/17  17:18    


 


Chloride  102 mmol/L ()   05/12/17  17:18    


 


Carbon Dioxide  24 mmol/L (22-30)   05/12/17  17:18    


 


Anion Gap  18  (5-19)   05/12/17  17:18    


 


BUN  4 mg/dL (7-20)  L  05/12/17  17:18    


 


Creatinine  0.74 mg/dL (0.52-1.25)   05/12/17  17:18    


 


Est GFR ( Amer)  > 60  (>60)   05/12/17  17:18    


 


Est GFR (Non-Af Amer)  > 60  (>60)   05/12/17  17:18    


 


Glucose  174 mg/dL ()  H  05/12/17  17:18    


 


POC Glucose  192 mg/dL ()  H  05/12/17  17:39    


 


Calcium  10.4 mg/dL (8.4-10.2)  H  05/12/17  17:18    


 


Magnesium  1.7 mg/dL (1.6-2.3)   05/12/17  19:36    


 


Total Bilirubin  1.0 mg/dL (0.2-1.3)   05/12/17  17:18    


 


Direct Bilirubin  0.6 mg/dL (0.0-0.4)  H  05/12/17  17:18    


 


Indirect Bilirubin  Not Reportable   05/12/17  17:18    


 


Neonat Total Bilirubin  Not Reportable   05/12/17  17:18    


 


AST  17 U/L (14-36)   05/12/17  17:18    


 


ALT  30 U/L (9-52)   05/12/17  17:18    


 


Alkaline Phosphatase  83 U/L ()   05/12/17  17:18    


 


Creatine Kinase  54 U/L ()   05/12/17  17:18    


 


CK-MB (CK-2)  0.28 ng/mL (<4.55)   05/12/17  17:18    


 


Troponin I  0.017 ng/mL  05/16/17  09:26    


 


Total Protein  8.6 g/dL (6.3-8.2)  H  05/12/17  17:18    


 


Albumin  4.6 g/dL (3.5-5.0)   05/12/17  17:18    


 


TSH  0.48 uIU/mL (0.47-4.68)   05/12/17  19:36    


 


Urine Color  YELLOW   05/12/17  16:27    


 


Urine Appearance  SLIGHTLY-CLOUDY   05/12/17  16:27    


 


Urine pH  5.0  (5.0-9.0)   05/12/17  16:27    


 


Ur Specific Gravity  1.019   05/12/17  16:27    


 


Urine Protein  100 mg/dL (NEGATIVE)  H  05/12/17  16:27    


 


Urine Glucose (UA)  NEGATIVE mg/dL (NEGATIVE)   05/12/17  16:27    


 


Urine Ketones  80 mg/dL (NEGATIVE)  H  05/12/17  16:27    


 


Urine Blood  NEGATIVE  (NEGATIVE)   05/12/17  16:27    


 


Urine Nitrite  NEGATIVE  (NEGATIVE)   05/12/17  16:27    


 


Urine Bilirubin  NEGATIVE  (NEGATIVE)   05/12/17  16:27    


 


Urine Urobilinogen  NEGATIVE mg/dL (<2.0)   05/12/17  16:27    


 


Ur Leukocyte Esterase  NEGATIVE  (NEGATIVE)   05/12/17  16:27    


 


Urine WBC (Auto)  2 /HPF  05/12/17  16:27    


 


Squamous Epi Cells Auto  1 /HPF  05/12/17  16:27    


 


Urine Mucus (Auto)  RARE /LPF  05/12/17  16:27    


 


Urine Ascorbic Acid  NEGATIVE  (NEGATIVE)   05/12/17  16:27    


 


Fluid Tube Number  4   05/12/17  19:05    


 


CSF Volume  4.5 CC  05/12/17  19:05    


 


CSF Appearance  CLEAR   05/12/17  19:05    


 


CSF Color  COLORLESS   05/12/17  19:05    


 


CSF WBC  4 /uL (0-5)   05/12/17  19:05    


 


CSF RBC  0 /uL (0-800)   05/12/17  19:05    


 


CSF Color (1)  COLORLESS   05/12/17  19:05    


 


CSF Appearance (1)  CLEAR   05/12/17  19:05    


 


CSF Color (2)  COLORLESS   05/12/17  19:05    


 


CSF Appearance (2)  CLEAR   05/12/17  19:05    


 


CSF Color (3)  COLORLESS   05/12/17  19:05    


 


CSF Appearance (3)  CLEAR   05/12/17  19:05    


 


CSF Color (4)  COLORLESS   05/12/17  19:05    


 


CSF Appearance (4)  CLEAR   05/12/17  19:05    


 


CSF Glucose  111 mg/dL (40-70)  H  05/12/17  19:05    


 


CSF Total Protein  49 mg/dL (12-60)   05/12/17  19:05    


 


Stool Occult Blood  NEGATIVE  (NEGATIVE)   05/12/17  15:55    


 


Urine Opiates Screen  UNCONFIRMED POSITIVE   05/12/17  16:27    


 


Urine Methadone Screen  NEGATIVE   05/12/17  16:27    


 


Ur Barbiturates Screen  NEGATIVE   05/12/17  16:27    


 


Phenytoin  < 3.0 ug/mL (10.0-20.0)  L  05/13/17  05:35    


 


Ur Phencyclidine Scrn  NEGATIVE   05/12/17  16:27    


 


Ur Amphetamines Screen  NEGATIVE   05/12/17  16:27    


 


U Benzodiazepines Scrn  NEGATIVE   05/12/17  16:27    


 


Urine Cocaine Screen  NEGATIVE   05/12/17  16:27    


 


U Marijuana (THC) Screen  NEGATIVE   05/12/17  16:27    


 


C. difficile Tox (PCR)  NEGATIVE  (NEGATIVE)   05/14/17  23:25    











Impressions: 


 





Head CT  05/12/17 00:00


IMPRESSION:  NORMAL BRAIN CT WITHOUT CONTRAST.


 








Chest X-Ray  05/12/17 15:37


IMPRESSION:  NO ACUTE RADIOGRAPHIC FINDING IN THE CHEST.


 








Abdomen/Pelvis CT  05/12/17 21:06


IMPRESSION:  NO ACUTE FINDINGS WITHIN THE ABDOMEN OR PELVIS.  NO SIGNIFICANT 

CHANGE FROM PRIOR STUDY.


 














Assessment & Plan





- Diagnosis


(1) Chest pain





Is this a current diagnosis for this admission?: YesPlan: 


The patient is chest pain.  Will obtain lipids and serial cardiac enzymes.  

Will repeat EKG.  Given the patient's morbid obesity and previous history will 

consult Dr. Fabian with cardiology for evaluation.








(2) Viral gastroenteritis


Is this a current diagnosis for this admission?: YesPlan: 


Resolved








(3) SIRS (systemic inflammatory response syndrome)


Is this a current diagnosis for this admission?: YesPlan: 


Secondary to the above this is resolved








(4) Sinus tachycardia


Is this a current diagnosis for this admission?: YesPlan: 


Secondary to the above this is resolved








(5) Encephalopathy acute


Is this a current diagnosis for this admission?: YesPlan: 


Resolved








(6) Morbid obesity with BMI of 50.0-59.9, adult


Is this a current diagnosis for this admission?: Yes





(7) GOSIA (obstructive sleep apnea)


Is this a current diagnosis for this admission?: Yes





(8) Opiate dependence, continuous


Is this a current diagnosis for this admission?: Yes





(9) Polypharmacy


Is this a current diagnosis for this admission?: Yes








- Time


Time Spent with patient: 


on this visit including assessment, plan, physical examination,  specialty 

collaboration, and patient education is 35 minutes.


Time Spent with patient: 35 or more minutes


Medications reviewed and adjusted accordingly: Yes


Anticipated discharge: SNF


Within: when bed available

## 2017-05-17 RX ADMIN — MONTELUKAST SODIUM SCH MG: 10 TABLET, FILM COATED ORAL at 18:00

## 2017-05-17 RX ADMIN — SILVER SULFADIAZINE SCH APPLIC: 10 CREAM TOPICAL at 10:00

## 2017-05-17 RX ADMIN — EXTENDED PHENYTOIN SODIUM SCH MG: 100 CAPSULE ORAL at 14:00

## 2017-05-17 RX ADMIN — VERAPAMIL HYDROCHLORIDE SCH MG: 120 TABLET, FILM COATED ORAL at 10:00

## 2017-05-17 RX ADMIN — METOPROLOL SUCCINATE SCH MG: 50 TABLET, EXTENDED RELEASE ORAL at 22:38

## 2017-05-17 RX ADMIN — EXTENDED PHENYTOIN SODIUM SCH MG: 100 CAPSULE ORAL at 06:00

## 2017-05-17 RX ADMIN — PREDNISONE SCH MG: 10 TABLET ORAL at 08:00

## 2017-05-17 RX ADMIN — ASPIRIN 325 MG ORAL TABLET SCH MG: 325 PILL ORAL at 10:00

## 2017-05-17 RX ADMIN — METOPROLOL SUCCINATE SCH MG: 50 TABLET, EXTENDED RELEASE ORAL at 10:00

## 2017-05-17 RX ADMIN — DULOXETINE SCH MG: 30 CAPSULE, DELAYED RELEASE ORAL at 10:00

## 2017-05-17 RX ADMIN — FAMOTIDINE SCH MG: 20 TABLET, FILM COATED ORAL at 22:38

## 2017-05-17 RX ADMIN — FOLIC ACID SCH MG: 1 TABLET ORAL at 10:00

## 2017-05-17 RX ADMIN — IPRATROPIUM BROMIDE AND ALBUTEROL SCH PUFF: 20; 100 SPRAY, METERED RESPIRATORY (INHALATION) at 18:00

## 2017-05-17 RX ADMIN — IPRATROPIUM BROMIDE AND ALBUTEROL SCH PUFF: 20; 100 SPRAY, METERED RESPIRATORY (INHALATION) at 06:00

## 2017-05-17 RX ADMIN — PRENATAL W/O A VIT W/ FE FUMARATE-FA CAP 106.5-1 MG SCH CAP: 106.5 CAPSULE ORAL at 10:00

## 2017-05-17 RX ADMIN — CETIRIZINE HYDROCHLORIDE SCH MG: 10 TABLET, FILM COATED ORAL at 10:00

## 2017-05-17 RX ADMIN — METFORMIN HYDROCHLORIDE SCH MG: 500 TABLET, FILM COATED ORAL at 08:00

## 2017-05-17 RX ADMIN — PREGABALIN SCH MG: 75 CAPSULE ORAL at 22:38

## 2017-05-17 RX ADMIN — IPRATROPIUM BROMIDE AND ALBUTEROL SCH PUFF: 20; 100 SPRAY, METERED RESPIRATORY (INHALATION) at 12:00

## 2017-05-17 RX ADMIN — SILVER SULFADIAZINE SCH APPLIC: 10 CREAM TOPICAL at 14:00

## 2017-05-17 RX ADMIN — SPIRONOLACTONE SCH MG: 25 TABLET, FILM COATED ORAL at 10:00

## 2017-05-17 RX ADMIN — LISINOPRIL SCH MG: 5 TABLET ORAL at 10:00

## 2017-05-17 RX ADMIN — EXTENDED PHENYTOIN SODIUM SCH MG: 100 CAPSULE ORAL at 22:38

## 2017-05-17 RX ADMIN — POTASSIUM CHLORIDE SCH MEQ: 750 TABLET, FILM COATED, EXTENDED RELEASE ORAL at 10:00

## 2017-05-17 RX ADMIN — METFORMIN HYDROCHLORIDE SCH MG: 500 TABLET, FILM COATED ORAL at 16:00

## 2017-05-17 RX ADMIN — CLOPIDOGREL BISULFATE SCH MG: 75 TABLET, FILM COATED ORAL at 10:00

## 2017-05-17 RX ADMIN — DULOXETINE SCH MG: 30 CAPSULE, DELAYED RELEASE ORAL at 22:39

## 2017-05-17 RX ADMIN — PREGABALIN SCH MG: 75 CAPSULE ORAL at 10:00

## 2017-05-17 RX ADMIN — FUROSEMIDE SCH MG: 80 TABLET ORAL at 08:00

## 2017-05-17 RX ADMIN — MORPHINE SULFATE PRN MG: 100 TABLET, EXTENDED RELEASE ORAL at 01:55

## 2017-05-17 RX ADMIN — SILVER SULFADIAZINE SCH APPLIC: 10 CREAM TOPICAL at 18:00

## 2017-05-17 RX ADMIN — ATORVASTATIN CALCIUM SCH MG: 20 TABLET, FILM COATED ORAL at 18:00

## 2017-05-17 RX ADMIN — AMLODIPINE BESYLATE SCH MG: 10 TABLET ORAL at 22:39

## 2017-05-17 NOTE — XCELERA REPORT
[address] [address]

                             Tel: [phone]

                             Fax: [phone]



                    Transthoracic Echocardiogram Report

____________________________________________________________________________



Name: KALIE DANIEL

MRN: [id number]                Age: 56 yrs

Gender: Female                 : 1960

Patient Status: Inpatient      Patient Location: 4S\S\429\S\A

Account #: [id number]

Study Date: 2017 02:04 PM

Accession #: [id number]

____________________________________________________________________________



Height: 61 in        Weight: 303 lb        BSA: 2.3 m2



____________________________________________________________________________

Procedure: A complete two-dimensional transthoracic echocardiogram was

performed (2D, M-mode, spectral and color flow Doppler). The study was

technically difficult with many images being suboptimal in quality.

Reason For Study: chest pain





Ordering Physician: DEZ JANSEN

Performed By: Gail Flores

____________________________________________________________________________





Interpretation Summary

The left ventricular ejection fraction is normal.

There is mild concentric left ventricular hypertrophy.

The left ventricle is grossly normal size.

Doppler measurements suggest impaired left ventricular relaxation, which is

associated with grade I/IV or mild diastolic dysfunction

Wall motion cannot be accurately commented on, but no definite regional

wall motion abnormalities noted.

The right ventricular systolic function is normal.

The left atrial size is normal.

The right atrium is normal in size

There is no mitral valve stenosis.

There is a trace amount of mitral regurgitation

There is no aortic valve stenosis

No aortic regurgitation is present.

There is a trace or physiologic amount of tricuspid regurgitation

Tricuspid regurgitation jet envelope not well defined to measure RV

systolic pressure accurately.

The aortic root is not well visualized but is probably normal size.

The inferior vena cava appeared normal and decreased > 50% with respiration

(RAP 5-10 mmHg)

There is no pericardial effusion.



____________________________________________________________________________



MMode/2D Measurements \T\ Calculations

RVDd: 2.6 cm       LVIDd: 4.4 cm FS: 28.8 %          Ao root diam: 3.2 cm

IVSd: 1.2 cm       LVIDs: 3.2 cm EDV(Teich): 89.3 ml

                   LVPWd: 1.2 cm ESV(Teich): 39.6 ml Ao root area: 8.0 cm2

                                 EF(Teich): 55.6 %   LA dimension: 3.6 cm



        _____________________________________________________________

LVOT diam: 2.2 cm

LVOT area: 3.7 cm2





Doppler Measurements \T\ Calculations

MV E max amada:      MV P1/2t max amada:    Ao V2 max:       LV V1 max P.0 cm/sec        77.5 cm/sec          149.0 cm/sec     5.1 mmHg

MV A max amada:      MV P1/2t: 41.2 msec  Ao max PG:       LV V1 max:

98.2 cm/sec        MVA(P1/2t): 5.3 cm2  8.9 mmHg         113.0 cm/sec

MV E/A: 0.78       MV dec slope:        JERMAINE(V,D): 2.8 cm2

                   550.5 cm/sec2



        _____________________________________________________________

PA V2 max:         TR max amada:

109.1 cm/sec       195.5 cm/sec

PA max PG:         TR max PG: 15.3 mmHg

4.8 mmHg



____________________________________________________________________________

Left Ventricle

The left ventricle is grossly normal size. There is mild concentric left

ventricular hypertrophy. The left ventricular ejection fraction is normal.

Doppler measurements suggest impaired left ventricular relaxation, which is

associated with grade I/IV or mild diastolic dysfunction. Wall motion

cannot be accurately commented on, but no definite regional wall motion

abnormalities noted.



Right Ventricle

The right ventricle is grossly normal size. There is normal right

ventricular wall thickness. The right ventricular systolic function is

normal.



Atria

The right atrium is normal in size. The left atrial size is normal.

Interarterial septum not well visualized and not well dopplered. Cannot

comment on ASD/PFO presence.





Mitral Valve

The mitral valve leaflets are sclerotic, but show no functional

abnormalities. There is no mitral valve stenosis. There is a trace amount

of mitral regurgitation.



Aortic Valve

The aortic valve is not well visualized secondary to technical limitations.

There is no aortic valve stenosis. No aortic regurgitation is present.



Tricuspid Valve

The tricuspid valve is not well visualized, but is grossly normal. There is

no tricuspid stenosis. There is a trace or physiologic amount of tricuspid

regurgitation. Tricuspid regurgitation jet envelope not well defined to

measure RV systolic pressure accurately.



Pulmonic Valve

The pulmonic valve is not well visualized.



Great Vessels

The aortic root is not well visualized but is probably normal size. The

inferior vena cava appeared normal and decreased > 50% with respiration

(RAP 5-10 mmHg).





Effusions

There is no pericardial effusion.



____________________________________________________________________________



Electronically signed by:      Dez Jansen      on 2017 08:35 PM



CC: DEZ JANSEN

>

Dez Jansen

## 2017-05-17 NOTE — PROGRESS NOTE E
Progress Note



NAME: KALIE DANIEL

MRN: [id number]

: 1960             AGE: 56Y

DATE:  2017                     ROOM: 429



SUBJECTIVE:

The patient is lying in bed.  She has completed the first part of her

stress test.  The patient denies any vomiting, although she does admit to

nausea.  She denies any shortness of breath, no dizziness.  The patient

did have a recurrent episode of chest pain and, therefore, cardiology was

consulted on the patient and has been ordered the Cardiolite stress test. 

The patient does have a bed offer at [facility] and does not voice

any other concerns at this time.



REVIEW OF SYSTEMS:

Rest of review of systems is negative.



MEDICATIONS:

Medications have been reviewed.



OBJECTIVE:

GENERAL:  The patient is a 56-year-old  female who is

awake and alert.  She is oriented to person, place, time and situation. 

She is verbal and conversational and does to appear to be in any acute

distress.



VITAL SIGNS:  Temperature is 98.2, pulse 91, respirations 17, blood

pressure 121/76, oxygen saturation is 96% on room air.



SKIN:  Pale and dry.  No rashes, not diaphoretic.



HEENT:  Pupils are equal, round, reactive to light and accommodation. 

Conjunctivae pink.



NECK:  There is no JVD.



CARDIOVASCULAR:  Heart is regular.  There is no murmur or rub.



CHEST:  Diminished but clear, symmetrical.  It is tender to palpation over

the sternum.



ABDOMEN:  Obese, soft.  Bowel sounds are present.



EXTREMITIES:  No clubbing, cyanosis or edema.



PSYCHIATRIC:  Appropriate affect.  Pleasant mood.



NEUROLOGIC:  Intact.



DIAGNOSTIC DATA:

Lab values are as follows:  Sodium is 141.9, potassium 4.65, glucose 99,

carbon dioxide 36, BUN 35, creatinine 1.0.



IMPRESSION AND PLAN:

1.  CHEST PAIN.  This may be secondary to the patient's underlying chronic

pain.  Regardless, the patient is awaiting Cardiolite stress test.  Do

appreciate cardiology's input on this.  Will continue to monitor.

2.  ACUTE GASTROENTERITIS.  Overall this appears improved.

3.  SIRS SECONDARY TO THE ABOVE.  Overall this is improved.  Tachycardia

has resolved.

4.  OPIATE DEPENDENCY, CONTINUOUS.  Will continue the patient's home

medications as well as cathartics.

5.  MORBID OBESITY.  Do appreciate Dr. Fabian's input with this.



DISPOSITION:

The patient is a FULL CODE.  Pending the patient's symptomatology and

diagnostic findings, will re-evaluate in the a.m. for possible discharge.



Time spent on this followup including assessment, plan, physical

examination, patient education, and specialty collaboration is 35 minutes.







DICTATING PHYSICIAN:  NATE MORALES NP





[id number]                  DT: 2017    1124

PHY#: [id number]            DD: 2017    1107

ID:   [id number]           JOB#: [id number]       ACCT: [id number]



cc:

>

## 2017-05-18 RX ADMIN — METFORMIN HYDROCHLORIDE SCH MG: 500 TABLET, FILM COATED ORAL at 19:34

## 2017-05-18 RX ADMIN — METOPROLOL SUCCINATE SCH MG: 50 TABLET, EXTENDED RELEASE ORAL at 11:26

## 2017-05-18 RX ADMIN — AMLODIPINE BESYLATE SCH MG: 10 TABLET ORAL at 21:52

## 2017-05-18 RX ADMIN — SILVER SULFADIAZINE SCH APPLIC: 10 CREAM TOPICAL at 18:49

## 2017-05-18 RX ADMIN — SPIRONOLACTONE SCH MG: 25 TABLET, FILM COATED ORAL at 11:27

## 2017-05-18 RX ADMIN — VERAPAMIL HYDROCHLORIDE SCH MG: 120 TABLET, FILM COATED ORAL at 11:37

## 2017-05-18 RX ADMIN — CLOPIDOGREL BISULFATE SCH MG: 75 TABLET, FILM COATED ORAL at 11:34

## 2017-05-18 RX ADMIN — SILVER SULFADIAZINE SCH APPLIC: 10 CREAM TOPICAL at 18:51

## 2017-05-18 RX ADMIN — LISINOPRIL SCH MG: 5 TABLET ORAL at 11:34

## 2017-05-18 RX ADMIN — METOPROLOL SUCCINATE SCH MG: 50 TABLET, EXTENDED RELEASE ORAL at 21:48

## 2017-05-18 RX ADMIN — CETIRIZINE HYDROCHLORIDE SCH MG: 10 TABLET, FILM COATED ORAL at 11:27

## 2017-05-18 RX ADMIN — MORPHINE SULFATE PRN MG: 100 TABLET, EXTENDED RELEASE ORAL at 23:52

## 2017-05-18 RX ADMIN — MORPHINE SULFATE PRN MG: 100 TABLET, EXTENDED RELEASE ORAL at 04:15

## 2017-05-18 RX ADMIN — PREGABALIN SCH MG: 75 CAPSULE ORAL at 11:26

## 2017-05-18 RX ADMIN — ALBUTEROL SULFATE PRN PUFF: 90 AEROSOL, METERED RESPIRATORY (INHALATION) at 18:49

## 2017-05-18 RX ADMIN — IPRATROPIUM BROMIDE AND ALBUTEROL SCH PUFF: 20; 100 SPRAY, METERED RESPIRATORY (INHALATION) at 01:46

## 2017-05-18 RX ADMIN — DULOXETINE SCH MG: 30 CAPSULE, DELAYED RELEASE ORAL at 11:26

## 2017-05-18 RX ADMIN — ASPIRIN 325 MG ORAL TABLET SCH MG: 325 PILL ORAL at 11:22

## 2017-05-18 RX ADMIN — FAMOTIDINE SCH MG: 20 TABLET, FILM COATED ORAL at 21:48

## 2017-05-18 RX ADMIN — ATORVASTATIN CALCIUM SCH MG: 20 TABLET, FILM COATED ORAL at 18:49

## 2017-05-18 RX ADMIN — PRENATAL W/O A VIT W/ FE FUMARATE-FA CAP 106.5-1 MG SCH CAP: 106.5 CAPSULE ORAL at 11:23

## 2017-05-18 RX ADMIN — PREGABALIN SCH MG: 75 CAPSULE ORAL at 21:47

## 2017-05-18 RX ADMIN — EXTENDED PHENYTOIN SODIUM SCH MG: 100 CAPSULE ORAL at 13:41

## 2017-05-18 RX ADMIN — FOLIC ACID SCH MG: 1 TABLET ORAL at 11:27

## 2017-05-18 RX ADMIN — METFORMIN HYDROCHLORIDE SCH MG: 500 TABLET, FILM COATED ORAL at 08:50

## 2017-05-18 RX ADMIN — ALBUTEROL SULFATE PRN PUFF: 90 AEROSOL, METERED RESPIRATORY (INHALATION) at 23:53

## 2017-05-18 RX ADMIN — EXTENDED PHENYTOIN SODIUM SCH MG: 100 CAPSULE ORAL at 21:48

## 2017-05-18 RX ADMIN — DULOXETINE SCH MG: 30 CAPSULE, DELAYED RELEASE ORAL at 21:48

## 2017-05-18 RX ADMIN — FUROSEMIDE SCH MG: 80 TABLET ORAL at 08:51

## 2017-05-18 RX ADMIN — MONTELUKAST SODIUM SCH MG: 10 TABLET, FILM COATED ORAL at 18:48

## 2017-05-18 RX ADMIN — PREDNISONE SCH MG: 10 TABLET ORAL at 08:51

## 2017-05-18 RX ADMIN — EXTENDED PHENYTOIN SODIUM SCH MG: 100 CAPSULE ORAL at 06:01

## 2017-05-18 RX ADMIN — POTASSIUM CHLORIDE SCH MEQ: 750 TABLET, FILM COATED, EXTENDED RELEASE ORAL at 11:27

## 2017-05-18 RX ADMIN — IPRATROPIUM BROMIDE AND ALBUTEROL SCH PUFF: 20; 100 SPRAY, METERED RESPIRATORY (INHALATION) at 06:01

## 2017-05-18 RX ADMIN — IPRATROPIUM BROMIDE AND ALBUTEROL SCH PUFF: 20; 100 SPRAY, METERED RESPIRATORY (INHALATION) at 11:35

## 2017-05-18 RX ADMIN — IPRATROPIUM BROMIDE AND ALBUTEROL SCH PUFF: 20; 100 SPRAY, METERED RESPIRATORY (INHALATION) at 18:49

## 2017-05-18 NOTE — DRAGON STRESS TEST REPORT
INTRAVENOUS LEXISCAN CARDIOLITE STRESS TEST USING SINGLE PHOTON EMMISION 
COMPUTERIZED TOMOGRAPHIC.



DATE OF PROCEDURE: May 17, 2017



INDICATION : Chest pain]



CARDIAC RISK FACTORS: Diabetes, hypertension, dyslipidemia, rheumatoid 
arthritis and lupus



RESTING EKG: Sinus rhythm, no baseline ST-T wave changes noted



STRESS EKG: No significant changes noted with LexiScan bolus 



REASON FOR TERMINATION: Protocol.





PROCEDURE REPORT: Baseline heart rate 108 beats per minute with blood pressure 
of 107/60.  Patient had no significant complaints.  Heart rate at 2 minutes 
post bolus 118 with a blood pressure of 121/60.  3 minutes post bolus heart 
rate 112 with blood pressure of 102/62.  No significant EKG changes were noted.
  Patient had no significant complaints during the procedure or postprocedure. 

Patient injected with Aminophyllin 75 mg at 3 minutes or later after Lexiscan 
bolus.



CONCLUSIONS: Normal EKG and hemodynamic response to IV LexiScan.







NUCLEAR DATA: 2 day protocol used.  A stress test with stress imaging on day 1 
and rest images on day 2

At rest the patient was given 43.6 millicuries of technetium 99 sestamibi 
injected intravenously.  As per protocol rest gated SPECT images were obtained.
  For a stress test patient was given intravenous LexiScan at a dose of 0.4 mg 
in 5 mL intravenously, followed by flush with normal saline.  Stress imaging, 
the stress dose of 40.2 millicuries of technetium 99 sestamibi was injected 
intravenously.  As per protocol stress gated images were obtained.  



NUCLEAR INTERPRETATION: Both raw and processed data were used for 
interpretation.  Visual, qualitative, computer-generated quantitative data was 
used.  There was good myocardial uptake of technetium compound.  Motion 
artifact and soft tissue attenuations were noted.  Increased visceral uptake 
was noted.  No definitive areas of transient perfusion defect noted.  No 
definitive areas of fixed perfusion defect or scars noted.



EKG gated imaging showed LV EF at 51 %, rest and stress gated EF similar 
visually.  T. I D. ratio was 0.96.  Lung heart ratio noted to be within normal 
limits 0.31.  Significant extracardiac and abnormal radiotracer activities were 
noted in the left axilla.  RV free wall uptake was noted to be mildly increased.



IMPRESSION: Also refer to comments under nuclear interpretation. Also test 
results needs to be interpreted in the context of pretest probability.





1.  There is no definitive scintigraphic evidence of LexiScan induced 
myocardial ischemia.

2.  There is no definitive scintigraphic evidence of myocardial infarction/scar.

3.  EKG gated imaging shows left ejection fraction of approximately 51 %. 

4.  Clinical correlation requested as occasionally single vessel disease or 
balanced ischemia could be missed. In approximately 10% of the cases Lexiscan 
may not cause adequate vasodilatory stress.

5.  Marked increased uptake, very localized noted on both days in the left 
axilla.  This is consistent with either lymph node or abscess.  This was 
reported to hospitalist.



RECOMMENDATIONS:

Aggressive risk factor modification, medical therapy.

Clinical correlation with echocardiogram derived ejection fraction.

Inability to exercise by itself can lead to increased cardiovascular event 
risks.

Consider cardiology consultation and or follow-up if clinically indicated.

I AM AVAILABLE FOR CARDIOLOGY CONSULTATION AND FOLLOWUP IF REQUESTED BY PMD











Dez Fabian M.D., TALYA

Consultant cardiologist,

Board certified in cardiovascular diseases, 

Nuclear cardiology, 

Echocardiography

Cardiac CT and cardiac MRI

Ph. [phone] 

Fax [phone]
ARLENE

## 2017-05-18 NOTE — PROGRESS NOTE E
Progress Note



NAME: KALIE DANIEL

MRN: [id number]

: 1960             AGE: 56Y

DATE:  2017                    ROOM: 429



SUBJECTIVE:

Patient is lying in bed.  She states that she did better overnight.   She

did not have any episodes of chest pain.  The patient has been afebrile. 

Blood pressure has been in a good range.  The patient's nausea has

improved and vomiting has resolved.  The patient does not voice any other

concerns at this time.



REVIEW OF SYSTEMS:

Rest of the review of systems negative.



MEDICATIONS:

Medications have been reviewed.



OBJECTIVE:

GENERAL:  The patient is a 56-year-old  female.  Is awake,

alert, and oriented to person, place, time, and situation.  She is verbal,

conversational and does not appear to be in any acute distress.



VITAL SIGNS:  Are as follows:  Temperature 97.7, pulse 98, respirations

18, blood pressure is 107/83, oxygen saturation is 96% on room air.



SKIN:  Is warm and dry.  No rash.  Not diaphoretic.



HEENT:  Pupils equal, round, reactive to light and accommodation. 

Conjunctivae pink.



NECK:  There is no JVD.



CARDIOVASCULAR:  Heart is regular.  There is no murmur or rub.



CHEST:  Clear, symmetrical, unlabored.



ABDOMEN:  Soft, nontender, nondistended.



BACK:  No CVA tenderness or sacral edema.



EXTREMITIES:  No clubbing, cyanosis, edema.



PSYCHIATRIC:  Appropriate affect.  Very apathetic mood.



DIAGNOSTICS:



Chemistry obtained on 2017:  Sodium is 143, potassium 3.6, chloride

is 102, carbon dioxide 24, BUN 4, creatinine 0.74, glucose 174, calcium is

10.4.



IMPRESSION AND PLAN:

1.  ACUTE VIRAL GASTROENTERITIS.  THIS IS RESOLVED.

2.  SIRS, SECONDARY TO THE ABOVE.  THIS IS RESOLVED.

3.  SINUS TACHYCARDIA, SECONDARY TO THE ABOVE.  THIS IS RESOLVED.

4.  ACUTE ENCEPHALOPATHY.   THIS IS RESOLVED.

5.  CHEST PAIN; MOST LIKELY DUE TO UNDERLYING RHEUMATOLOGICAL ISSUES. 

HOWEVER, THE PATIENT HAS UNDERGONE CARDIOLITE STRESS TEST WHICH WAS

UNREMARKABLE FROM A CARDIAC STANDPOINT.

6.  RIGHT AXILLARY NODE.  I WAS MADE AWARE BY CARDIOLOGY THE PATIENT HAD A

SIGNIFICANT AMOUNT OF UPTAKE IN THE RIGHT AXILLA AREA.  Will obtain

ultrasound of this area and follow.

7.  MORBID OBESITY WITH A BMI OF GREATER THAN 50 BUT LESS THAN 60.  Will

continue to encourage weight reduction.

8.  OBSTRUCTIVE SLEEP APNEA.  Will continue CPAP.

9.  OPIATE DEPENDENCY, CONTINUOUS.  Will continue the patient's home

medication.

10.  POLYPHARMACY.  THIS MAY HAVE SOME BEARING ON THE PATIENT'S MENTAL

STATE AT TIMES.



DISPOSITION:

The patient is a FULL CODE.



Pending patient's symptomatology and diagnostic findings the patient can

be hopefully discharged to SNF in the a.m.



TIME SPENT ON THIS FOLLOWUP:

Including assessment, plan, physical examination, patient education, and

specialty collaboration is 35 minutes.









DICTATING PHYSICIAN:  NATE MORALES NP





[id number]                  DT: 2017    1434

PHY#: [id number]            DD: 2017    1421

ID:   [id number]           JOB#: [id number]       ACCT: [id number]



cc:

>







MTDD

## 2017-05-19 RX ADMIN — MONTELUKAST SODIUM SCH MG: 10 TABLET, FILM COATED ORAL at 19:07

## 2017-05-19 RX ADMIN — IPRATROPIUM BROMIDE AND ALBUTEROL SCH PUFF: 20; 100 SPRAY, METERED RESPIRATORY (INHALATION) at 00:05

## 2017-05-19 RX ADMIN — IPRATROPIUM BROMIDE AND ALBUTEROL SCH PUFF: 20; 100 SPRAY, METERED RESPIRATORY (INHALATION) at 19:09

## 2017-05-19 RX ADMIN — LISINOPRIL SCH MG: 5 TABLET ORAL at 11:36

## 2017-05-19 RX ADMIN — PREGABALIN SCH MG: 75 CAPSULE ORAL at 21:37

## 2017-05-19 RX ADMIN — FUROSEMIDE SCH MG: 80 TABLET ORAL at 12:13

## 2017-05-19 RX ADMIN — DULOXETINE SCH MG: 30 CAPSULE, DELAYED RELEASE ORAL at 12:16

## 2017-05-19 RX ADMIN — SILVER SULFADIAZINE SCH APPLIC: 10 CREAM TOPICAL at 12:17

## 2017-05-19 RX ADMIN — PREDNISONE SCH MG: 10 TABLET ORAL at 12:15

## 2017-05-19 RX ADMIN — SILVER SULFADIAZINE SCH APPLIC: 10 CREAM TOPICAL at 15:22

## 2017-05-19 RX ADMIN — SILVER SULFADIAZINE SCH APPLIC: 10 CREAM TOPICAL at 19:08

## 2017-05-19 RX ADMIN — DULOXETINE SCH MG: 30 CAPSULE, DELAYED RELEASE ORAL at 21:36

## 2017-05-19 RX ADMIN — POTASSIUM CHLORIDE SCH MEQ: 750 TABLET, FILM COATED, EXTENDED RELEASE ORAL at 12:14

## 2017-05-19 RX ADMIN — METFORMIN HYDROCHLORIDE SCH MG: 500 TABLET, FILM COATED ORAL at 15:21

## 2017-05-19 RX ADMIN — MORPHINE SULFATE PRN MG: 100 TABLET, EXTENDED RELEASE ORAL at 19:06

## 2017-05-19 RX ADMIN — EXTENDED PHENYTOIN SODIUM SCH MG: 100 CAPSULE ORAL at 21:37

## 2017-05-19 RX ADMIN — CLOPIDOGREL BISULFATE SCH MG: 75 TABLET, FILM COATED ORAL at 12:14

## 2017-05-19 RX ADMIN — ATORVASTATIN CALCIUM SCH MG: 20 TABLET, FILM COATED ORAL at 19:07

## 2017-05-19 RX ADMIN — METOPROLOL SUCCINATE SCH MG: 50 TABLET, EXTENDED RELEASE ORAL at 21:36

## 2017-05-19 RX ADMIN — PRENATAL W/O A VIT W/ FE FUMARATE-FA CAP 106.5-1 MG SCH CAP: 106.5 CAPSULE ORAL at 12:14

## 2017-05-19 RX ADMIN — FOLIC ACID SCH MG: 1 TABLET ORAL at 12:15

## 2017-05-19 RX ADMIN — AMLODIPINE BESYLATE SCH MG: 10 TABLET ORAL at 21:36

## 2017-05-19 RX ADMIN — EXTENDED PHENYTOIN SODIUM SCH MG: 100 CAPSULE ORAL at 06:00

## 2017-05-19 RX ADMIN — METFORMIN HYDROCHLORIDE SCH MG: 500 TABLET, FILM COATED ORAL at 12:15

## 2017-05-19 RX ADMIN — ASPIRIN 325 MG ORAL TABLET SCH MG: 325 PILL ORAL at 12:16

## 2017-05-19 RX ADMIN — METOPROLOL SUCCINATE SCH MG: 50 TABLET, EXTENDED RELEASE ORAL at 12:13

## 2017-05-19 RX ADMIN — IPRATROPIUM BROMIDE AND ALBUTEROL SCH PUFF: 20; 100 SPRAY, METERED RESPIRATORY (INHALATION) at 06:00

## 2017-05-19 RX ADMIN — FAMOTIDINE SCH MG: 20 TABLET, FILM COATED ORAL at 21:37

## 2017-05-19 RX ADMIN — ALBUTEROL SULFATE PRN PUFF: 90 AEROSOL, METERED RESPIRATORY (INHALATION) at 15:21

## 2017-05-19 RX ADMIN — VERAPAMIL HYDROCHLORIDE SCH MG: 120 TABLET, FILM COATED ORAL at 12:19

## 2017-05-19 RX ADMIN — SPIRONOLACTONE SCH MG: 25 TABLET, FILM COATED ORAL at 12:05

## 2017-05-19 RX ADMIN — CETIRIZINE HYDROCHLORIDE SCH MG: 10 TABLET, FILM COATED ORAL at 12:19

## 2017-05-19 RX ADMIN — PREGABALIN SCH MG: 75 CAPSULE ORAL at 12:15

## 2017-05-19 RX ADMIN — IPRATROPIUM BROMIDE AND ALBUTEROL SCH PUFF: 20; 100 SPRAY, METERED RESPIRATORY (INHALATION) at 13:35

## 2017-05-19 RX ADMIN — EXTENDED PHENYTOIN SODIUM SCH MG: 100 CAPSULE ORAL at 15:21

## 2017-05-19 NOTE — TRANSFER SUMMARY E
Transfer Summary



NAME:     KALIE DANIEL

MRN: [id number]

: 1960                   AGE: 56Y

ADMITTED: 2017              TRANSFERRED: 2017



CODE STATUS:

FULL CODE.



PRIMARY CARE PROVIDER:

KAYLEY CHILDS PA-C



CONSULTING CARDIOLOGIST:

PRECIOUS FABIAN M.D.



DISCHARGE DIAGNOSES:

Includes:

1.   Acute viral gastroenteritis, which is resolved.

2.   SIRS secondary to the above.

3.   Sinus tachycardia secondary to #2, which is resolved.

4.   Acute encephalopathy secondary to the above, which is resolved.

5.   Rheumatoid arthritis.

6.   Lupus.

7.   Chest pain secondary to RA.

8.   Right axilla nodes.

9.   Morbid obesity.

10.  Obstructive sleep apnea.

11.  Polypharmacy.

12.  Dyslipidemia.

13.  Hypertriglyceridemia.



DISCHARGE MEDICATIONS:

Include:

1.   Albuterol HFA 2 puffs inhalation q. 4 hours p.r.n.

2.   Norvasc 10 mg p.o. daily.

3.   Lipitor 20 mg p.o. daily.

4.   Tessalon Perles 300 mg p.o. q. 8 hours p.r.n.

5.   Celebrex 20 mg p.o. q. 12 hours.

6.   Zyrtec 10 mg p.o. daily.

7.   Cevimeline 30 mg p.o. t.i.d.

8.   Plavix 75 mg p.o. daily.

9.   Colace 100 mg p.o. b.i.d.

10.  Cymbalta 60 mg p.o. q. 12 hours.

11.  Vitamin D2 of 50,000 international units p.o. q. 2 weeks.

12.  Enbrel 50 mg subcutaneously weekly.

13.  Prevacid 40 mg p.o. q. hour of sleep.

14.  Folic acid 1 mg p.o. daily.

15.  Lasix 80 mg p.o. q. morning.

16.  Toradol 10 mg p.o. q. 6 hours p.r.n.

17.  Lisinopril 5 mg p.o. daily.

18.  Glucophage 1,000 mg p.o. b.i.d.

19.  Methotrexate 15 mg p.o. every Thursday.

20.  Metoprolol  mg p.o. q. 12 hours.

21.  Singulair 10 mg p.o. q. hour of sleep.

22.  Bactroban 2% one application nasally b.i.d. x10 days.

23.  Prenatal vitamin 1 tablet p.o. daily.

24.  Potassium chloride 20 mEq p.o. daily.

25.  Lyrica 150 mg p.o. q. 12 hours.

26.  Aldactone 25 mg p.o. daily.

27.  Sulfasalazine 500 mg p.o. t.i.d.

28.  Zanaflex 4 mg p.o. q. 8 hours p.r.n.

29.  Calan 120 mg p.o. daily.

30.  Ambien 10 mg p.o. q. hour of sleep p.r.n.



DIET:

Heart healthy as tolerated.



ACTIVITY:

Per rehab center.



HISTORY OF PRESENT ILLNESS:

The patient is a 56-year-old  female with a past medical

history of rheumatoid arthritis, and lupus.  The patient presented to the

emergency department via EMS due to altered mental status.  The patient

was noted to be confused approximately 2 hours prior to presentation.  The

report said that home health was at the patient's house and said the

patient had been at her baseline, then she became quite lethargic.  EMS

stated the patient actually improved in transportation when they saw the

patient.  The patient does give a history of having nausea with vomiting

as well as some diarrhea.  The patient was noted to be febrile with

tachycardia.  The patient did have an LP in the emergency department,

which was unremarkable.  Given these findings, the patient was referred to

the hospitalist for admission and management.



HOSPITAL COURSE:

The patient was admitted to continuous telemetry unit.  Serial cardiac

enzymes were obtained, which were not suggestive.  The patient remained

afebrile after remission.  The patient had no events on the telemetry

monitor; however, the patient was noted to have significant chest pain. 

Therefore, Dr. Fabian with cardiology was consulted.  The patient underwent

Cardiolite stress test and findings were unremarkable for any reversible

ischemia.  However, the patient did have a large area of uptake in the

right axilla and an ultrasound of the area was done, which revealed

enlarged nodes that the radiologist noted did not appear worrisome. 

However, this can be followed in an outpatient setting.  The patient's

symptoms have improved.  Symptoms of gastroenteritis have completely

resolved.  The patient's mentation is at her baseline and the patient is

ready for discharge for rehab.



DIAGNOSTIC DATA:

Lab values are as follows:  Hematology obtained on 2017:  WBC's are

10.6, hemoglobin is 12.5, hematocrit is 39.2, platelet count is 277,000. 

Sed rate is 20.



Chemistries obtained on 2017:  Sodium is 143, potassium is 3.6,

chloride is 102, carbon dioxide is 24, BUN 4, creatinine is 0.74, glucose

174, calcium is 10.4, bilirubin is 1, AST 17, ALT of 30, alk phos 83.  CK

54, CK-MB is 0.28.  Troponin is 0.043.  Total protein is 8.6, albumin 4.6.

TSH is 0.48.  Triglycerides are 246, cholesterol is 196, LDL is 112, VLDL

is 49, HDL is 36.



Urinalysis obtained on 2017:  Color yellow, appearance slightly

cloudy, pH is 5.0, specific gravity is 1.019, protein 100, glucose

negative, ketones 80, occult blood negative, nitrate negative, bilirubin

negative, urobilinogen is negative, leukocyte esterase is negative.  WBC's

2, RBC's 0, epithelial squamous cells 1, mucus rare, ascorbic acid is

negative.



Serology obtained on 2017:  C. diff toxin is negative.



Microbiology obtained on 2017:  Stool culture is unremarkable. 

Blood cultures obtained on 2017 are unremarkable.



Cerebrospinal fluid Gram stain obtained on 2017 is unremarkable.



Head CT obtained on 2017 reveals a normal brain CT without contrast.



Chest x-ray obtained on 2017 reveals no acute radiographic finding

of the chest.



CT of the abdomen and pelvis obtained on 2017 reveals no acute

findings within the abdomen and pelvis.



Cardiolite stress test obtained 2017 did not reveal any area of

reversible ischemia.



Axilla ultrasound obtained on 2017 revealed a few lymph nodes in the

left axilla, that do not have worrisome sonographic features, no evidence

of an abscess on either side.



PHYSICAL EXAMINATION:

GENERAL:  On examination, the patient is a well-developed, morbidly obese,

56-year-old  female who is awake, alert, oriented to

person, place, time, and situation.  She is verbal, conversational, and

does not appear to be in any acute distress.



VITAL SIGNS:  Temperature is 97.7, pulse 91, respirations 18, blood

pressure is 126/72, oxygen saturation is 98% on room air.



SKIN:  Warm and dry.  There is no rash.  He is not diaphoretic.



HEENT:  Pupils equal, round, and reactive to light and accommodation,

conjunctivae are pink.  There is no JVP.



CARDIOVASCULAR:  Heart is regular with no murmur or rub.



CHEST:  Clear, symmetrical, unlabored.



ABDOMEN:  Soft, nontender and nondistended.  Obese.



BACK:  No CVA tenderness or sacral edema.



EXTREMITIES:  No clubbing, cyanosis, or edema.



PSYCHIATRIC:  Appropriate affect, pleasant mood.



DISCHARGE PLAN:

The patient is advised to follow with a primary care provider within 1-2

weeks for hospital followup.  The patient will need followup ultrasound

imaging of axillary nodes.



TIME SPENT:

Time spent on this discharge, including assessment, plan, physical

examination, patient education, and resource alignment was 40 minutes.





DICTATING PHYSICIAN:  NATE MORALES NP





[id number]                  DT: 2017      1049

PHY#: [id number]            DD: 2017      1036

ID:   [id number]           JOB#: [id number]       ACCT: [id number]



cc:NATE MORALES NP

>







MTDD

## 2017-05-20 RX ADMIN — METFORMIN HYDROCHLORIDE SCH MG: 500 TABLET, FILM COATED ORAL at 08:38

## 2017-05-20 RX ADMIN — CLOPIDOGREL BISULFATE SCH MG: 75 TABLET, FILM COATED ORAL at 09:29

## 2017-05-20 RX ADMIN — SILVER SULFADIAZINE SCH APPLIC: 10 CREAM TOPICAL at 09:36

## 2017-05-20 RX ADMIN — IPRATROPIUM BROMIDE AND ALBUTEROL SCH PUFF: 20; 100 SPRAY, METERED RESPIRATORY (INHALATION) at 23:10

## 2017-05-20 RX ADMIN — PRENATAL W/O A VIT W/ FE FUMARATE-FA CAP 106.5-1 MG SCH CAP: 106.5 CAPSULE ORAL at 09:30

## 2017-05-20 RX ADMIN — IPRATROPIUM BROMIDE AND ALBUTEROL SCH PUFF: 20; 100 SPRAY, METERED RESPIRATORY (INHALATION) at 00:35

## 2017-05-20 RX ADMIN — ASPIRIN 325 MG ORAL TABLET SCH MG: 325 PILL ORAL at 09:30

## 2017-05-20 RX ADMIN — VERAPAMIL HYDROCHLORIDE SCH MG: 120 TABLET, FILM COATED ORAL at 09:36

## 2017-05-20 RX ADMIN — PREDNISONE SCH MG: 10 TABLET ORAL at 08:38

## 2017-05-20 RX ADMIN — FAMOTIDINE SCH MG: 20 TABLET, FILM COATED ORAL at 21:44

## 2017-05-20 RX ADMIN — DULOXETINE SCH MG: 30 CAPSULE, DELAYED RELEASE ORAL at 09:31

## 2017-05-20 RX ADMIN — EXTENDED PHENYTOIN SODIUM SCH MG: 100 CAPSULE ORAL at 13:37

## 2017-05-20 RX ADMIN — CARBAMIDE PEROXIDE 6.5% OTIC SOLN SCH DROP: 6.5 SOLUTION at 21:45

## 2017-05-20 RX ADMIN — CARBAMIDE PEROXIDE 6.5% OTIC SOLN SCH DROP: 6.5 SOLUTION at 13:38

## 2017-05-20 RX ADMIN — CARBAMIDE PEROXIDE 6.5% OTIC SOLN SCH DROP: 6.5 SOLUTION at 17:02

## 2017-05-20 RX ADMIN — PREGABALIN SCH MG: 75 CAPSULE ORAL at 09:30

## 2017-05-20 RX ADMIN — EXTENDED PHENYTOIN SODIUM SCH MG: 100 CAPSULE ORAL at 21:43

## 2017-05-20 RX ADMIN — SILVER SULFADIAZINE SCH APPLIC: 10 CREAM TOPICAL at 17:03

## 2017-05-20 RX ADMIN — METOPROLOL SUCCINATE SCH MG: 50 TABLET, EXTENDED RELEASE ORAL at 21:44

## 2017-05-20 RX ADMIN — SILVER SULFADIAZINE SCH APPLIC: 10 CREAM TOPICAL at 13:38

## 2017-05-20 RX ADMIN — SPIRONOLACTONE SCH MG: 25 TABLET, FILM COATED ORAL at 09:30

## 2017-05-20 RX ADMIN — METOPROLOL SUCCINATE SCH MG: 50 TABLET, EXTENDED RELEASE ORAL at 09:36

## 2017-05-20 RX ADMIN — EXTENDED PHENYTOIN SODIUM SCH MG: 100 CAPSULE ORAL at 05:28

## 2017-05-20 RX ADMIN — METFORMIN HYDROCHLORIDE SCH MG: 500 TABLET, FILM COATED ORAL at 17:01

## 2017-05-20 RX ADMIN — AMLODIPINE BESYLATE SCH MG: 10 TABLET ORAL at 21:43

## 2017-05-20 RX ADMIN — FOLIC ACID SCH MG: 1 TABLET ORAL at 09:31

## 2017-05-20 RX ADMIN — PREGABALIN SCH MG: 75 CAPSULE ORAL at 21:44

## 2017-05-20 RX ADMIN — LISINOPRIL SCH MG: 5 TABLET ORAL at 09:36

## 2017-05-20 RX ADMIN — IPRATROPIUM BROMIDE AND ALBUTEROL SCH PUFF: 20; 100 SPRAY, METERED RESPIRATORY (INHALATION) at 12:35

## 2017-05-20 RX ADMIN — DULOXETINE SCH MG: 30 CAPSULE, DELAYED RELEASE ORAL at 21:44

## 2017-05-20 RX ADMIN — CETIRIZINE HYDROCHLORIDE SCH MG: 10 TABLET, FILM COATED ORAL at 09:31

## 2017-05-20 RX ADMIN — FUROSEMIDE SCH MG: 80 TABLET ORAL at 08:38

## 2017-05-20 RX ADMIN — IPRATROPIUM BROMIDE AND ALBUTEROL SCH PUFF: 20; 100 SPRAY, METERED RESPIRATORY (INHALATION) at 17:02

## 2017-05-20 RX ADMIN — MONTELUKAST SODIUM SCH MG: 10 TABLET, FILM COATED ORAL at 17:02

## 2017-05-20 RX ADMIN — IPRATROPIUM BROMIDE AND ALBUTEROL SCH PUFF: 20; 100 SPRAY, METERED RESPIRATORY (INHALATION) at 05:28

## 2017-05-20 RX ADMIN — POTASSIUM CHLORIDE SCH MEQ: 750 TABLET, FILM COATED, EXTENDED RELEASE ORAL at 09:30

## 2017-05-20 RX ADMIN — ATORVASTATIN CALCIUM SCH MG: 20 TABLET, FILM COATED ORAL at 17:02

## 2017-05-20 NOTE — PROGRESS NOTE E
Progress Note



NAME: KALIE DANIEL

MRN: [id number]

: 1960             AGE: 56Y

DATE:  2017          ROOM: 429



SUBJECTIVE:

Patient is lying in bed.  She states that she is still having ear pain. 

This examined yesterday.  I feel that this is just external tenderness. 

The patient denies any nausea, vomiting.  No diarrhea, shortness of

breath, dizziness, chest pain.  No fevers, chills.  The patient has been

afebrile.  Blood pressure has been in a good range.  The patient does not

voice any other concerns at this time.



REVIEW OF SYSTEMS:

Rest of the review of systems negative.



MEDICATIONS:

Medications have been reviewed.



OBJECTIVE:

GENERAL:  The patient is a 56-year-old  female who is

awake, alert.  She is oriented to person, place, time, and situation.  She

is verbal, conversational and does not appear to be in any acute distress.



VITAL SIGNS AS FOLLOWS:  Temperature 98.2, pulse 99, respirations 18,

blood pressure is 105/61, oxygen saturation is 97% on room air.



SKIN:  Warm and dry.  No rash.  Not diaphoretic.



HEENT:  Pupils equal, round, reactive to light and accommodation. 

Conjunctivae pink.



NECK:  There is no JVP.



CARDIOVASCULAR:  Heart is regular.  There is no murmur or rub.



CHEST:  Clear, symmetrical, unlabored.



ABDOMEN:  Soft, nontender, nondistended.



BACK:  No CVA tenderness or sacral edema.



EXTREMITIES:  No clubbing, cyanosis, edema.



PSYCHIATRIC:  Appropriate affect.  Pleasant mood.



DIAGNOSTICS:

Lab values are as follows:  Cholesterol obtained on 2017: 

Triglycerides are 246, cholesterol is 196, , VLDL 49, HDL is 36.



IMPRESSION AND PLAN:

1.  ACUTE VIRAL GASTROENTERITIS.  THIS IS RESOLVED.

2.  SIRS, SECONDARY NUMBER ONE.  THIS IS RESOLVED.

3.  SINUS TACHYCARDIA, SECONDARY TO NUMBER TWO.  THIS IS RESOLVED.

4.  ACUTE ENCEPHALOPATHY, SECONDARY TO THE ABOVE.  THIS IS RESOLVED.

5.  RHEUMATOID ARTHRITIS.  Will continue current medications.

5.  CHEST PAIN SECONDARY TO RHEUMATOID ARTHRITIS.

6.  RIGHT AXILLA NODES.  Imaging appears stable.

7.  MORBID OBESITY.  Will encourage weight reduction.

8.  OBSTRUCTIVE SLEEP APNEA.  Will continue CPAP.

10. DYSLIPIDEMIA.  Will continue meds.

11. POLYPHARMACY.

12. LEFT EAR PAIN.  I examined this and no evidence of otitis.  Does

appear to have some wax.  Will order Debrox.  Mostly tender to external

manipulation.



DISPOSITION:

The patient is a FULL CODE.  Pending patient's symptomatology and

diagnostic findings, we will reevaluate in the a.m.  The patient can go to

rehab when her bed is available Monday.



TIME SPENT ON THIS FOLLOWUP:

Including assessment, plan, physical examination, and patient education,

is 25 minutes.

 







DICTATING PHYSICIAN:  NATE MORALES NP





[id number]                  DT: 2017    1248

PHY#: [id number]            DD: 2017    1245

ID:   [id number]           JOB#: [id number]       ACCT: [id number]



cc:

>

## 2017-05-21 RX ADMIN — METFORMIN HYDROCHLORIDE SCH MG: 500 TABLET, FILM COATED ORAL at 08:34

## 2017-05-21 RX ADMIN — ATORVASTATIN CALCIUM SCH MG: 20 TABLET, FILM COATED ORAL at 17:16

## 2017-05-21 RX ADMIN — LISINOPRIL SCH MG: 5 TABLET ORAL at 10:20

## 2017-05-21 RX ADMIN — CARBAMIDE PEROXIDE 6.5% OTIC SOLN SCH DROP: 6.5 SOLUTION at 21:43

## 2017-05-21 RX ADMIN — FOLIC ACID SCH MG: 1 TABLET ORAL at 10:19

## 2017-05-21 RX ADMIN — DULOXETINE SCH MG: 30 CAPSULE, DELAYED RELEASE ORAL at 10:20

## 2017-05-21 RX ADMIN — FUROSEMIDE SCH MG: 80 TABLET ORAL at 08:34

## 2017-05-21 RX ADMIN — AMLODIPINE BESYLATE SCH MG: 10 TABLET ORAL at 23:32

## 2017-05-21 RX ADMIN — VERAPAMIL HYDROCHLORIDE SCH MG: 120 TABLET, FILM COATED ORAL at 10:22

## 2017-05-21 RX ADMIN — IPRATROPIUM BROMIDE AND ALBUTEROL SCH PUFF: 20; 100 SPRAY, METERED RESPIRATORY (INHALATION) at 12:02

## 2017-05-21 RX ADMIN — EXTENDED PHENYTOIN SODIUM SCH MG: 100 CAPSULE ORAL at 21:44

## 2017-05-21 RX ADMIN — CETIRIZINE HYDROCHLORIDE SCH MG: 10 TABLET, FILM COATED ORAL at 10:20

## 2017-05-21 RX ADMIN — EXTENDED PHENYTOIN SODIUM SCH MG: 100 CAPSULE ORAL at 05:44

## 2017-05-21 RX ADMIN — SPIRONOLACTONE SCH MG: 25 TABLET, FILM COATED ORAL at 10:19

## 2017-05-21 RX ADMIN — DULOXETINE SCH MG: 30 CAPSULE, DELAYED RELEASE ORAL at 21:44

## 2017-05-21 RX ADMIN — CLOPIDOGREL BISULFATE SCH MG: 75 TABLET, FILM COATED ORAL at 10:19

## 2017-05-21 RX ADMIN — MORPHINE SULFATE PRN MG: 100 TABLET, EXTENDED RELEASE ORAL at 20:53

## 2017-05-21 RX ADMIN — PREGABALIN SCH MG: 75 CAPSULE ORAL at 21:44

## 2017-05-21 RX ADMIN — PREGABALIN SCH MG: 75 CAPSULE ORAL at 10:20

## 2017-05-21 RX ADMIN — ASPIRIN 325 MG ORAL TABLET SCH MG: 325 PILL ORAL at 10:19

## 2017-05-21 RX ADMIN — IPRATROPIUM BROMIDE AND ALBUTEROL SCH PUFF: 20; 100 SPRAY, METERED RESPIRATORY (INHALATION) at 05:43

## 2017-05-21 RX ADMIN — PREDNISONE SCH MG: 10 TABLET ORAL at 08:34

## 2017-05-21 RX ADMIN — ALBUTEROL SULFATE PRN PUFF: 90 AEROSOL, METERED RESPIRATORY (INHALATION) at 17:16

## 2017-05-21 RX ADMIN — POTASSIUM CHLORIDE SCH MEQ: 750 TABLET, FILM COATED, EXTENDED RELEASE ORAL at 10:19

## 2017-05-21 RX ADMIN — MONTELUKAST SODIUM SCH MG: 10 TABLET, FILM COATED ORAL at 17:16

## 2017-05-21 RX ADMIN — IPRATROPIUM BROMIDE AND ALBUTEROL SCH PUFF: 20; 100 SPRAY, METERED RESPIRATORY (INHALATION) at 17:18

## 2017-05-21 RX ADMIN — CARBAMIDE PEROXIDE 6.5% OTIC SOLN SCH DROP: 6.5 SOLUTION at 17:15

## 2017-05-21 RX ADMIN — PRENATAL W/O A VIT W/ FE FUMARATE-FA CAP 106.5-1 MG SCH CAP: 106.5 CAPSULE ORAL at 10:19

## 2017-05-21 RX ADMIN — METFORMIN HYDROCHLORIDE SCH MG: 500 TABLET, FILM COATED ORAL at 17:15

## 2017-05-21 RX ADMIN — SILVER SULFADIAZINE SCH APPLIC: 10 CREAM TOPICAL at 17:15

## 2017-05-21 RX ADMIN — CARBAMIDE PEROXIDE 6.5% OTIC SOLN SCH DROP: 6.5 SOLUTION at 13:42

## 2017-05-21 RX ADMIN — IPRATROPIUM BROMIDE AND ALBUTEROL SCH PUFF: 20; 100 SPRAY, METERED RESPIRATORY (INHALATION) at 23:33

## 2017-05-21 RX ADMIN — METOPROLOL SUCCINATE SCH MG: 50 TABLET, EXTENDED RELEASE ORAL at 10:20

## 2017-05-21 RX ADMIN — METOPROLOL SUCCINATE SCH MG: 50 TABLET, EXTENDED RELEASE ORAL at 23:33

## 2017-05-21 RX ADMIN — SILVER SULFADIAZINE SCH APPLIC: 10 CREAM TOPICAL at 10:20

## 2017-05-21 RX ADMIN — EXTENDED PHENYTOIN SODIUM SCH MG: 100 CAPSULE ORAL at 13:42

## 2017-05-21 RX ADMIN — CARBAMIDE PEROXIDE 6.5% OTIC SOLN SCH DROP: 6.5 SOLUTION at 10:19

## 2017-05-21 RX ADMIN — FAMOTIDINE SCH MG: 20 TABLET, FILM COATED ORAL at 21:44

## 2017-05-21 RX ADMIN — SILVER SULFADIAZINE SCH APPLIC: 10 CREAM TOPICAL at 13:42

## 2017-05-21 NOTE — PROGRESS NOTE E
Progress Note



NAME: KALIE DANIEL

MRN: [id number]

: 1960             AGE: 56Y

DATE:  2017                     ROOM: 429



SUBJECTIVE:

The patient is currently lying crossways in the bed.  The patient has been

has been up to the bathroom.  She denies any nausea, vomiting or diarrhea.

No shortness of breath, dizziness or chest pain.  No fever or chills.  The

patient has been afebrile.  Blood pressures have been in a good range. 

The patient refused to wear a CPAP last night, stating that she fell

asleep and when she woke up, she "did not want to worry with it."  The

patient does not voice any other concerns at this time.



REVIEW OF SYSTEMS:

Rest of review of systems is negative.



MEDICATIONS:

Medications have been reviewed.



OBJECTIVE:

GENERAL:  The patient is a well-developed, obese, 56-year-old 

American female who is awake and alert to person, place, time and

situation.  She is verbal and conversational.  She does to appear to be in

any acute distress.



VITAL SIGNS:  Temperature is 98.4, pulse 105, respirations 20, blood

pressure 125/82, oxygen saturation is 99% on 2 L nasal cannula.



SKIN:  Warm and dry.  No rash.  She is not diaphoretic.



HEENT:  Pupils are equal, round, reactive to light and accommodation. 

Conjunctivae are pink.  There is no JVP.



CARDIOVASCULAR:  Heart is regular.  There is no murmur or rub.



CHEST:  Clear, symmetrical, unlabored.



ABDOMEN:  Soft, nontender, nondistended.  Bowel sounds are present.  No

palpable organomegaly.



BACK:  No CVA tenderness or sacral edema.



EXTREMITIES:  No clubbing, cyanosis or edema.



DIAGNOSTICS:

Lab values are as follows:  Hematology obtained on 2017:  WBCs 10.6,

hemoglobin 12.5, hematocrit 39.2, and platelet count is 277,000.



IMPRESSION AND PLAN:

1.  ACUTE VIRAL GASTROENTERITIS, RESOLVED.

2.  SIRS SECONDARY TO #1, RESOLVED.

3.  SINUS TACHYCARDIA SECONDARY TO #2, RESOLVED.

4.  ACUTE ENCEPHALOPATHY SECONDARY TO #1, RESOLVED.

5.  RHEUMATOID ARTHRITIS.  Continue home medications.

6.  CHEST PAIN SECONDARY TO RA.

7.  RIGHT AXILLA NODE.  Imaging appears stable.  This can be followed up

on an outpatient setting.

8.  LEFT EAR PAIN.  Do believe that this the way the patient is laying on

her ear.  This appears to have improved.

9.  MORBID OBESITY.  Encourage weight reduction.

10.  OBSTRUCTIVE SLEEP APNEA.  Encourage compliance with CPAP.

11.  DYSLIPIDEMIA.  Continue medications.

12.  POLYPHARMACY.



DISPOSITION:

The patient is a FULL CODE.  Pending the patient's symptomatology and

diagnostic findings, will re-evaluate in the a.m.  The patient can go to

rehab as soon as a bed is available.



TIME:

Time spent on this followup including assessment, plan, physical

examination and patient education is 20 minutes.





DICTATING PHYSICIAN:  NATE MORALES NP





[id number]                  DT: 2017    1527

PHY#: [id number]            DD: 2017    1425

ID:   [id number]           JOB#: [id number]       ACCT: [id number]



cc:

>

## 2017-05-21 NOTE — PDOC PROGRESS REPORT
Subjective


Progress Note for:: 05/19/17


Subjective:: 


Patient seems to be doing well with no complaints.NST results discussed.





Physical Exam


Vital Signs: 


 











Temp Pulse Resp BP Pulse Ox


 


 98.3 F   96   24 H  97/65 L  98 


 


 05/19/17 08:01  05/19/17 14:00  05/19/17 08:01  05/19/17 08:01  05/19/17 08:01








 Intake & Output











 05/18/17 05/19/17 05/20/17





 06:59 06:59 06:59


 


Intake Total 600 1025 240


 


Output Total 400  1800


 


Balance 200 1025 -1560











Exam: 


GENERAL: well-nourished and in no acute distress.  Alert and oriented x3


HEAD: Atraumatic, normocephalic.


EYES: Pupils equal round and reactive to light, extraocular movements intact, 

sclera anicteric, conjunctiva are normal.


ENT: TMs normal, nares patent, oropharynx clear without exudates. Moist mucous 

membranes.  No oral ulcerations or bleeding gums noted


NECK: supple without lymphadenopathy.  Trachea is central.  No cervical or 

axillary lymphadenopathy noted.  Carotids are 2+, JVD WNL . Left axilla tender.


LUNGS: Respiration seems nonlabored, no significant accessory muscle action 

noted.  Breath sounds clear to auscultation bilaterally and equal noted. No 

wheezes rales or rhonchi noted.  No significant dullness noted on percussion.


CHEST: Palpation of the chest wall shows no significant chest wall tenderness.  

No other significant abnormalities noted.


HEART: [place] NP, No PSH,  1/6 TAMIA aortic area, 1/6 pan systolic murmur mitral 

area, no rubs, no gallops.


ABDOMEN: Soft, no significant tenderness appreciated, normoactive bowel sounds. 

No guarding, no rebound.  No rigidity noted . No masses appreciated.


EXTREMITIES: Pedal pulses are 1-2+, no calf tenderness noted. No clubbing or 

cyanosis.trace pedal edema noted


NEUROLOGICAL: Focused neurological exam showed no significant neurologic 

deficit. Normal speech, no focal weakness appreciated. 


PSYCH: Normal mood, normal affect.  Judgment and insight within normal limits.


SKIN: No significant ecchymosis, rash, ulcerations or signs of pruritus noted.


MUSCULOSKELETAL EXAM: No significant joint swelling noted.








Results


Laboratory Results: 


 











  05/15/17 05/16/17 05/16/17





  20:45 03:10 09:26


 


Troponin I  0.022  0.023  0.017











Impressions: 


 





Head CT  05/12/17 00:00


IMPRESSION:  NORMAL BRAIN CT WITHOUT CONTRAST.


 








Chest X-Ray  05/12/17 15:37


IMPRESSION:  NO ACUTE RADIOGRAPHIC FINDING IN THE CHEST.


 








Abdomen/Pelvis CT  05/12/17 21:06


IMPRESSION:  NO ACUTE FINDINGS WITHIN THE ABDOMEN OR PELVIS.  NO SIGNIFICANT 

CHANGE FROM PRIOR STUDY.


 








Extremity Ultrasound  05/18/17 14:14


IMPRESSION:  A FEW LYMPH NODES IN THE LEFT AXILLA WHICH DO NOT HAVE WORRISOME 

SONOGRAPHIC FEATURES.  NO EVIDENCE OF ABSCESS ON EITHER SIDE.


 














Assessment & Plan





- Diagnosis


(1) Chest pain


Qualifiers: 


     Chest pain type: unspecified        Qualified Code(s): R07.9 - Chest pain, 

unspecified  


Is this a current diagnosis for this admission?: Yes





(2) Opiate dependence, continuous


Is this a current diagnosis for this admission?: Yes





(3) Morbid obesity with BMI of 50.0-59.9, adult


Is this a current diagnosis for this admission?: Yes





(4) GOSIA (obstructive sleep apnea)


Is this a current diagnosis for this admission?: Yes





(5) Sinus tachycardia


Is this a current diagnosis for this admission?: Yes








- Notes


Notes: 


NST discussed.


Sleep apnea management discussed.


Patient was advised on risk factor modification today.  Patient advised in 

weight loss.


Patient was encouraged to ambulate..





- Time


Time with patient: 15-25 minutes


Medications reviewed and adjusted accordingly: Yes

## 2017-05-21 NOTE — PDOC PROGRESS REPORT
Subjective


Progress Note for:: 05/17/17


Subjective:: 


Patient seems to be doing well with no complaints.  Patient still having vague 

chest discomfort but she has discomfort all over the body.  Patient still has 

dyspnea on exertion.  Telemetry strips reviewed showed sinus rhythm.  Nuclear 

stress test procedure was explained to the patient in detail.  Risks benefits 

were discussed and informed consent was obtained.  Alternatives were discussed.

  Patient informed that based on risk factors, physical exam, lab data findings 

and symptoms there is at least intermediate probability of underlying CAD.  

Nuclear stress test procedure was therefore scheduled.





Physical Exam


Vital Signs: 


 











Temp Pulse Resp BP Pulse Ox


 


 97.5 F   108 H  20   136/77 H  97 


 


 05/18/17 16:46  05/18/17 16:46  05/18/17 16:46  05/18/17 16:46  05/18/17 16:46








 Intake & Output











 05/17/17 05/18/17 05/19/17





 06:59 06:59 06:59


 


Intake Total 960 600 480


 


Output Total 600 400 


 


Balance 360 200 480











Exam: 


GENERAL: well-nourished and in no acute distress.  Alert and oriented x3


HEAD: Atraumatic, normocephalic.


EYES: Pupils equal round and reactive to light, extraocular movements intact, 

sclera anicteric, conjunctiva are normal.


ENT: TMs normal, nares patent, oropharynx clear without exudates. Moist mucous 

membranes.  No oral ulcerations or bleeding gums noted


NECK: supple without lymphadenopathy.  Trachea is central.  No cervical or 

axillary lymphadenopathy noted.  Carotids are 2+, JVD WNL 


LUNGS: Respiration seems nonlabored, no significant accessory muscle action 

noted.  Breath sounds clear to auscultation bilaterally and equal noted. No 

wheezes rales or rhonchi noted.  No significant dullness noted on percussion.


CHEST: Palpation of the chest wall shows no significant chest wall tenderness.  

No other significant abnormalities noted.


HEART: [place] NP, No PSH,  1/6 TAMIA aortic area, 1/6 pan systolic murmur mitral 

area, no rubs, no gallops.


ABDOMEN: Soft, no significant tenderness appreciated, normoactive bowel sounds. 

No guarding, no rebound.  No rigidity noted . No masses appreciated.


EXTREMITIES: Pedal pulses are 1-2+, no calf tenderness noted. No clubbing or 

cyanosis.trace to 1+ pedal edema noted


NEUROLOGICAL: Focused neurological exam showed no significant neurologic 

deficit. Normal speech, no focal weakness appreciated. 


PSYCH: Normal mood, normal affect.  Judgment and insight within normal limits.


SKIN: No significant ecchymosis, rash, ulcerations or signs of pruritus noted.


MUSCULOSKELETAL EXAM: No significant joint swelling noted.








Results


Laboratory Results: 


 





05/14/17 23:25   Stool - Stool    - Final


05/14/17 23:25   Stool - Stool   Stool Culture - Final


                            NO SALMONELLA, SHIGELLA, CAMPYLOBACTER, OR E.COLI 

[id number]


                            RECOVERED.


                            NEGATIVE FOR SHIGA TOXINS 1&2.





 











  05/15/17 05/16/17 05/16/17





  20:45 03:10 09:26


 


Troponin I  0.022  0.023  0.017











Impressions: 


 





Head CT  05/12/17 00:00


IMPRESSION:  NORMAL BRAIN CT WITHOUT CONTRAST.


 








Chest X-Ray  05/12/17 15:37


IMPRESSION:  NO ACUTE RADIOGRAPHIC FINDING IN THE CHEST.


 








Abdomen/Pelvis CT  05/12/17 21:06


IMPRESSION:  NO ACUTE FINDINGS WITHIN THE ABDOMEN OR PELVIS.  NO SIGNIFICANT 

CHANGE FROM PRIOR STUDY.


 














Assessment & Plan





- Diagnosis


(1) Chest pain


Qualifiers: 


     Chest pain type: unspecified        Qualified Code(s): R07.9 - Chest pain, 

unspecified  


Is this a current diagnosis for this admission?: Yes





(2) Opiate dependence, continuous


Is this a current diagnosis for this admission?: Yes





(3) Morbid obesity with BMI of 50.0-59.9, adult


Is this a current diagnosis for this admission?: Yes





(4) GOSIA (obstructive sleep apnea)


Is this a current diagnosis for this admission?: Yes





(5) Sinus tachycardia


Is this a current diagnosis for this admission?: Yes








- Notes


Notes: 


2 D Echo results discussed.


Chest pain: Patient has significant cardiac risk factors.  There is at least an 

intermediate probability that patient has significant underlying CAD.  Patient 

therefore being scheduled for a nuclear stress test.  Because of patient's 

significant obesity, first part will be done today and second part tomorrow.  

Risk benefits of nuclear stress test were discussed..


Sinus tachycardia: 2D echo results discussed.  LVEF relatively well-preserved.  

Diastolic dysfunction noted.  No significant valvular abnormalities noted.


Obstructive sleep apnea: Continue with nightly CPAP therapy.  Patient was 

advised compliance.


Morbid obesity: Patient has been advised in weight loss.


Patient has significant arthritic condition which is being well managed.








- Time


Time with patient: Greater than 35 minutes - CODE STATUS was discussed, patient 

remains full code.  Surrogate decision-maker unchanged.  Multiple medical 

problems were addressed.  More than 50% of the time spent coordinating care, 

discussing management plans with involved caregivers.  Management plans 

discussed with involved personnels.  Medical decision making was of moderate to 

high complexity, patient's has multiple  comorbidities.  Patient today was seen 

multiple times, before the stress test was performed to explain the procedure, 

during the stress test and also later on just to make sure there were no 

complications.  There were no complications noted with the stress test.  

Patient informed that full results will be available tomorrow.  2D echo results 

were discussed.  Patient questions were answered.


Medications reviewed and adjusted accordingly: Yes

## 2017-05-21 NOTE — PDOC PROGRESS REPORT
Subjective


Progress Note for:: 05/20/17


Subjective:: 


Patient seems to be doing well with no complaints. Complains of fatigue and 

tiredness.





Physical Exam


Vital Signs: 


 











Temp Pulse Resp BP Pulse Ox


 


 98.8 F   123 H  24 H  119/74   96 


 


 05/20/17 16:26  05/20/17 19:00  05/20/17 16:26  05/20/17 16:26  05/20/17 16:26








 Intake & Output











 05/19/17 05/20/17 05/21/17





 06:59 06:59 06:59


 


Intake Total 1025 600 820


 


Output Total  1800 


 


Balance 1025 -1200 820











Exam: 


GENERAL: well-nourished and in no acute distress.  Alert and oriented x3


HEAD: Atraumatic, normocephalic.


EYES: Pupils equal round and reactive to light, extraocular movements intact, 

sclera anicteric, conjunctiva are normal.


ENT: TMs normal, nares patent, oropharynx clear without exudates. Moist mucous 

membranes.  No oral ulcerations or bleeding gums noted


NECK: supple without lymphadenopathy.  Trachea is central.  No cervical or 

axillary lymphadenopathy noted.  Carotids are 2+, JVD WNL 


LUNGS: Respiration seems nonlabored, no significant accessory muscle action 

noted.  Breath sounds clear to auscultation bilaterally and equal noted. No 

wheezes rales or rhonchi noted.  No significant dullness noted on percussion.


CHEST: Palpation of the chest wall shows no significant chest wall tenderness.  

No other significant abnormalities noted.


HEART: [place] NP, No PSH,  1/6 TAMIA aortic area, 1/6 pan systolic murmur mitral 

area, no rubs, no gallops.


ABDOMEN: Soft, no significant tenderness appreciated, normoactive bowel sounds. 

No guarding, no rebound.  No rigidity noted . No masses appreciated.


EXTREMITIES: Pedal pulses are 1-2+, no calf tenderness noted. No clubbing or 

cyanosis.trace pedal edema noted


NEUROLOGICAL: Focused neurological exam showed no significant neurologic 

deficit. Normal speech, no focal weakness appreciated. 


PSYCH: Normal mood, normal affect.  Judgment and insight within normal limits.


SKIN: No significant ecchymosis, rash, ulcerations or signs of pruritus noted.


MUSCULOSKELETAL EXAM: No significant joint swelling noted.








Results


Laboratory Results: 


 











  05/15/17 05/16/17 05/16/17





  20:45 03:10 09:26


 


Troponin I  0.022  0.023  0.017














  05/20/17





  21:05


 


Troponin I  < 0.012











Impressions: 


 





Head CT  05/12/17 00:00


IMPRESSION:  NORMAL BRAIN CT WITHOUT CONTRAST.


 








Chest X-Ray  05/12/17 15:37


IMPRESSION:  NO ACUTE RADIOGRAPHIC FINDING IN THE CHEST.


 








Abdomen/Pelvis CT  05/12/17 21:06


IMPRESSION:  NO ACUTE FINDINGS WITHIN THE ABDOMEN OR PELVIS.  NO SIGNIFICANT 

CHANGE FROM PRIOR STUDY.


 








Extremity Ultrasound  05/18/17 14:14


IMPRESSION:  A FEW LYMPH NODES IN THE LEFT AXILLA WHICH DO NOT HAVE WORRISOME 

SONOGRAPHIC FEATURES.  NO EVIDENCE OF ABSCESS ON EITHER SIDE.


 














Assessment & Plan





- Diagnosis


(1) Chest pain


Qualifiers: 


     Chest pain type: unspecified        Qualified Code(s): R07.9 - Chest pain, 

unspecified  


Is this a current diagnosis for this admission?: Yes





(2) Opiate dependence, continuous


Is this a current diagnosis for this admission?: Yes





(3) Morbid obesity with BMI of 50.0-59.9, adult


Is this a current diagnosis for this admission?: Yes





(4) GOSIA (obstructive sleep apnea)


Is this a current diagnosis for this admission?: Yes





(5) Sinus tachycardia


Is this a current diagnosis for this admission?: Yes








- Notes


Notes: 


stable.  Patient was seen just to make sure she had no further questions.  

Patient was encouraged in compliance with CPAP therapy, weight loss, aggressive 

risk factor modification.  Patient was offered that she could follow-up with me 

for cardiac care if she wished.





- Time


Time with patient: Less than 15 minutes - Patient informed that she could follow

-up with me if she wished.  Patient being transferred to rehab center.  Patient 

has been stable from cardiac standpoint without any recurrent chest pain.  We 

will therefore sign off.


Medications reviewed and adjusted accordingly: Yes

## 2017-05-21 NOTE — PDOC PROGRESS REPORT
Subjective


Progress Note for:: 05/18/17


Subjective:: 


Patient seems to be doing well with no complaints.  Patient had nuclear stress 

test yesterday without any complications.  Patient completed rest imaging today.





Physical Exam


Vital Signs: 


 











Temp Pulse Resp BP Pulse Ox


 


 97.5 F   108 H  20   136/77 H  97 


 


 05/18/17 16:46  05/18/17 16:46  05/18/17 16:46  05/18/17 16:46  05/18/17 16:46








 Intake & Output











 05/17/17 05/18/17 05/19/17





 06:59 06:59 06:59


 


Intake Total 960 600 480


 


Output Total 600 400 


 


Balance 360 200 480











Exam: 


GENERAL: well-nourished and in no acute distress.  Alert and oriented x3


HEAD: Atraumatic, normocephalic.


EYES: Pupils equal round and reactive to light, extraocular movements intact, 

sclera anicteric, conjunctiva are normal.


ENT: TMs normal, nares patent, oropharynx clear without exudates. Moist mucous 

membranes.  No oral ulcerations or bleeding gums noted


NECK: supple without lymphadenopathy.  Trachea is central.  No cervical or 

axillary lymphadenopathy noted.  Carotids are 2+, JVD WNL 


LUNGS: Respiration seems nonlabored, no significant accessory muscle action 

noted.  Breath sounds clear to auscultation bilaterally and equal noted. No 

wheezes rales or rhonchi noted.  No significant dullness noted on percussion.


CHEST: Palpation of the chest wall shows no significant chest wall tenderness.  

No other significant abnormalities noted.


HEART: [place] NP, No PSH,  1/6 TAMIA aortic area, 1/6 pan systolic murmur mitral 

area, no rubs, no gallops.


ABDOMEN: Soft, no significant tenderness appreciated, normoactive bowel sounds. 

No guarding, no rebound.  No rigidity noted . No masses appreciated.


EXTREMITIES: Pedal pulses are 1-2+, no calf tenderness noted. No clubbing or 

cyanosis.trace to 1+ pedal edema noted


NEUROLOGICAL: Focused neurological exam showed no significant neurologic 

deficit. Normal speech, no focal weakness appreciated. 


PSYCH: Normal mood, normal affect.  Judgment and insight within normal limits.


SKIN: No significant ecchymosis, rash, ulcerations or signs of pruritus noted.


MUSCULOSKELETAL EXAM: No significant joint swelling noted.








Results


Laboratory Results: 


 





05/14/17 23:25   Stool - Stool    - Final


05/14/17 23:25   Stool - Stool   Stool Culture - Final


                            NO SALMONELLA, SHIGELLA, CAMPYLOBACTER, OR E.COLI 

[id number]


                            RECOVERED.


                            NEGATIVE FOR SHIGA TOXINS 1&2.





 











  05/15/17 05/16/17 05/16/17





  20:45 03:10 09:26


 


Troponin I  0.022  0.023  0.017











Impressions: 


 





Head CT  05/12/17 00:00


IMPRESSION:  NORMAL BRAIN CT WITHOUT CONTRAST.


 








Chest X-Ray  05/12/17 15:37


IMPRESSION:  NO ACUTE RADIOGRAPHIC FINDING IN THE CHEST.


 








Abdomen/Pelvis CT  05/12/17 21:06


IMPRESSION:  NO ACUTE FINDINGS WITHIN THE ABDOMEN OR PELVIS.  NO SIGNIFICANT 

CHANGE FROM PRIOR STUDY.


 














Assessment & Plan





- Diagnosis


(1) Chest pain


Qualifiers: 


     Chest pain type: unspecified        Qualified Code(s): R07.9 - Chest pain, 

unspecified  


Is this a current diagnosis for this admission?: Yes





(2) Opiate dependence, continuous


Is this a current diagnosis for this admission?: Yes





(3) Morbid obesity with BMI of 50.0-59.9, adult


Is this a current diagnosis for this admission?: Yes





(4) GOSIA (obstructive sleep apnea)


Is this a current diagnosis for this admission?: Yes





(5) Sinus tachycardia


Is this a current diagnosis for this admission?: Yes








- Notes


Notes: 


NST results and Echocardiogram results discussed.


Chest pain: Patient had nuclear stress test yesterday but completed rest 

imaging today.  These results were discussed.  No significant areas of ischemia 

noted.  Incidental note of increased uptake was noted in the left axilla.  

Hospitalist was consulted and advised evaluation of the left axilla.  2D echo 

results were again reviewed with the patient.  Nuclear stress results were 

noted to be negative.  Discussed that occasionally single vessel disease could 

be missed.  Also informed that balanced ischemia can rarely be missed.  Patient 

was told that further evaluation will become indicated if he/she develops more 

symptoms indicative of ischemia or ischemia equivalent symptom.  At this point 

however would recommend aggressive risk factor modification, medical therapy.  

Patient advised on risk factor modification.


Obstructive sleep apnea: Continue with nightly CPAP therapy.  Patient was 

advised compliance.


Morbid obesity: Patient has been advised in weight loss.








- Time


Time with patient: Greater than 35 minutes


Medications reviewed and adjusted accordingly: Yes

## 2017-05-22 RX ADMIN — PREGABALIN SCH MG: 75 CAPSULE ORAL at 10:20

## 2017-05-22 RX ADMIN — DULOXETINE SCH MG: 30 CAPSULE, DELAYED RELEASE ORAL at 10:21

## 2017-05-22 RX ADMIN — ASPIRIN 325 MG ORAL TABLET SCH MG: 325 PILL ORAL at 10:22

## 2017-05-22 RX ADMIN — ATORVASTATIN CALCIUM SCH MG: 20 TABLET, FILM COATED ORAL at 17:18

## 2017-05-22 RX ADMIN — VERAPAMIL HYDROCHLORIDE SCH MG: 120 TABLET, FILM COATED ORAL at 10:23

## 2017-05-22 RX ADMIN — METOPROLOL SUCCINATE SCH MG: 50 TABLET, EXTENDED RELEASE ORAL at 10:23

## 2017-05-22 RX ADMIN — IPRATROPIUM BROMIDE AND ALBUTEROL SCH PUFF: 20; 100 SPRAY, METERED RESPIRATORY (INHALATION) at 23:51

## 2017-05-22 RX ADMIN — CLOPIDOGREL BISULFATE SCH MG: 75 TABLET, FILM COATED ORAL at 10:20

## 2017-05-22 RX ADMIN — POTASSIUM CHLORIDE SCH MEQ: 750 TABLET, FILM COATED, EXTENDED RELEASE ORAL at 10:20

## 2017-05-22 RX ADMIN — SILVER SULFADIAZINE SCH APPLIC: 10 CREAM TOPICAL at 10:22

## 2017-05-22 RX ADMIN — PREDNISONE SCH MG: 10 TABLET ORAL at 07:51

## 2017-05-22 RX ADMIN — FUROSEMIDE SCH MG: 80 TABLET ORAL at 07:50

## 2017-05-22 RX ADMIN — EXTENDED PHENYTOIN SODIUM SCH MG: 100 CAPSULE ORAL at 14:42

## 2017-05-22 RX ADMIN — IPRATROPIUM BROMIDE AND ALBUTEROL SCH PUFF: 20; 100 SPRAY, METERED RESPIRATORY (INHALATION) at 17:19

## 2017-05-22 RX ADMIN — FAMOTIDINE SCH MG: 20 TABLET, FILM COATED ORAL at 21:58

## 2017-05-22 RX ADMIN — METFORMIN HYDROCHLORIDE SCH MG: 500 TABLET, FILM COATED ORAL at 07:50

## 2017-05-22 RX ADMIN — DULOXETINE SCH MG: 30 CAPSULE, DELAYED RELEASE ORAL at 21:58

## 2017-05-22 RX ADMIN — SILVER SULFADIAZINE SCH APPLIC: 10 CREAM TOPICAL at 14:42

## 2017-05-22 RX ADMIN — CARBAMIDE PEROXIDE 6.5% OTIC SOLN SCH DROP: 6.5 SOLUTION at 10:22

## 2017-05-22 RX ADMIN — IPRATROPIUM BROMIDE AND ALBUTEROL SCH PUFF: 20; 100 SPRAY, METERED RESPIRATORY (INHALATION) at 05:40

## 2017-05-22 RX ADMIN — EXTENDED PHENYTOIN SODIUM SCH MG: 100 CAPSULE ORAL at 21:58

## 2017-05-22 RX ADMIN — CARBAMIDE PEROXIDE 6.5% OTIC SOLN SCH DROP: 6.5 SOLUTION at 17:18

## 2017-05-22 RX ADMIN — CETIRIZINE HYDROCHLORIDE SCH MG: 10 TABLET, FILM COATED ORAL at 10:21

## 2017-05-22 RX ADMIN — MONTELUKAST SODIUM SCH MG: 10 TABLET, FILM COATED ORAL at 17:18

## 2017-05-22 RX ADMIN — PREGABALIN SCH MG: 75 CAPSULE ORAL at 21:57

## 2017-05-22 RX ADMIN — METOPROLOL SUCCINATE SCH MG: 50 TABLET, EXTENDED RELEASE ORAL at 21:58

## 2017-05-22 RX ADMIN — LISINOPRIL SCH MG: 5 TABLET ORAL at 10:23

## 2017-05-22 RX ADMIN — AMLODIPINE BESYLATE SCH MG: 10 TABLET ORAL at 21:58

## 2017-05-22 RX ADMIN — FOLIC ACID SCH MG: 1 TABLET ORAL at 10:21

## 2017-05-22 RX ADMIN — CARBAMIDE PEROXIDE 6.5% OTIC SOLN SCH DROP: 6.5 SOLUTION at 21:59

## 2017-05-22 RX ADMIN — SILVER SULFADIAZINE SCH APPLIC: 10 CREAM TOPICAL at 17:18

## 2017-05-22 RX ADMIN — SPIRONOLACTONE SCH MG: 25 TABLET, FILM COATED ORAL at 10:21

## 2017-05-22 RX ADMIN — METFORMIN HYDROCHLORIDE SCH MG: 500 TABLET, FILM COATED ORAL at 17:18

## 2017-05-22 RX ADMIN — PRENATAL W/O A VIT W/ FE FUMARATE-FA CAP 106.5-1 MG SCH CAP: 106.5 CAPSULE ORAL at 10:20

## 2017-05-22 RX ADMIN — IPRATROPIUM BROMIDE AND ALBUTEROL SCH PUFF: 20; 100 SPRAY, METERED RESPIRATORY (INHALATION) at 11:09

## 2017-05-22 RX ADMIN — CARBAMIDE PEROXIDE 6.5% OTIC SOLN SCH DROP: 6.5 SOLUTION at 14:42

## 2017-05-22 RX ADMIN — MORPHINE SULFATE PRN MG: 100 TABLET, EXTENDED RELEASE ORAL at 18:07

## 2017-05-22 RX ADMIN — EXTENDED PHENYTOIN SODIUM SCH MG: 100 CAPSULE ORAL at 05:40

## 2017-05-22 NOTE — PROGRESS NOTE E
Progress Note



NAME: KALIE DANIEL

MRN: [id number]

: 1960             AGE: 56Y

DATE:  2017                    ROOM: 429



SUBJECTIVE:

Patient is lying in bed.  She is currently awaiting placement at SNF.  The

patient denies any nausea, vomiting, diarrhea.  No shortness of breath,

dizziness, chest pain.  No fevers, chills.  The patient has been afebrile.

Blood pressure has been in a good range.  The patient does not voice any

other concerns at this time.



REVIEW OF SYSTEMS:

Rest of the review of systems negative.



MEDICATIONS:

Medications have been reviewed.



OBJECTIVE:

GENERAL:  The patient is a 56-year-old  female who is

awake, alert, and oriented to person, place, time, and situation.  She is

verbal, conversational.  Does not appear to be in acute distress.



VITAL SIGNS:  As follows:  Temperature 98.1, pulse 98, respirations 20,

blood pressure is 100/64.  Oxygen saturation is 97% on 2 L nasal cannula.



SKIN:  Is warm and dry.  No rash.  Not diaphoretic.



HEENT:  Pupils equal, round, reactive to light and accommodation. 

Conjunctivae pink.



NECK:  There is no JVP.



CARDIOVASCULAR:  Heart is regular.  There is no murmur or rub.



CHEST:  Clear, symmetrical, unlabored.



ABDOMEN:  Obese, soft.  Bowel sounds present.



BACK:  No CVA tenderness or sacral edema.



EXTREMITIES:  No clubbing, cyanosis, edema.



PSYCHIATRIC:  Apathetic.



DIAGNOSTICS:

Lab values are as follows:  Chemistry obtained on 2017: 

Triglycerides are 246, cholesterol is 196, , VLDL is 49, HDL is 36.



IMPRESSION AND PLAN:

1.  ACUTE VIRAL GASTROENTERITIS.  THIS IS RESOLVED.

2.  SIRS, SECONDARY NUMBER ONE.

3.  SINUS TACHYCARDIA, SECONDARY TO NUMBER TWO.  THIS IS RESOLVED.

4.  ACUTE ENCEPHALOPATHY, SECONDARY TO #1.  THIS IS RESOLVED.

5.  RHEUMATOID ARTHRITIS.  Continue home medications.

6.  DEBILITY.  THIS IS SECONDARY TO THE PATIENT'S RA.

7.  CHEST PAIN; MOST LIKELY SECONDARY TO RA.

8.  RIGHT AXILLA NODE; APPEARS STABLE.  Can be followed in outpatient

setting.

9.  LEFT EAR PAIN.  THIS DOES APPEAR TO HAVE IMPROVED.  OTHER THAN A

SIGNIFICANT AMOUNT OF WAX, THERE WAS NOTHING ABNORMAL ON OTOSCOPIC EXAM.

GIVEN THAT THIS WAS EXACERBATED BY MOVEMENT, FEEL THAT THE PATIENT HAS

BEEN LYING ON HER EAR WRONG.

10.  MORBID OBESITY.  Will encourage weight reduction.

11.  OBSTRUCTIVE SLEEP APNEA.  Encourage compliance with CPAP.

12.  DYSLIPIDEMIA.  Continue home medication.

11.  POLYPHARMACY.



DISPOSITION:

The patient is a FULL CODE.   The patient can go to rehab as soon as a bed

is available.



TIME SPENT ON THIS FOLLOWUP:

Including assessment, plan, physical examination, and patient education,

and attempt at resource alignment is 20 minutes.





DICTATING PHYSICIAN:  NATE MORALES NP





[id number]                  DT: 2017    1527

PHY#: [id number]            DD: 2017    1508

ID:   [id number]           JOB#: [id number]       ACCT: [id number]



cc:

>

## 2017-05-23 RX ADMIN — MORPHINE SULFATE PRN MG: 100 TABLET, EXTENDED RELEASE ORAL at 04:18

## 2017-05-23 RX ADMIN — IPRATROPIUM BROMIDE AND ALBUTEROL SCH PUFF: 20; 100 SPRAY, METERED RESPIRATORY (INHALATION) at 11:12

## 2017-05-23 RX ADMIN — CARBAMIDE PEROXIDE 6.5% OTIC SOLN SCH DROP: 6.5 SOLUTION at 11:07

## 2017-05-23 RX ADMIN — PREDNISONE SCH MG: 10 TABLET ORAL at 11:09

## 2017-05-23 RX ADMIN — METOPROLOL SUCCINATE SCH MG: 50 TABLET, EXTENDED RELEASE ORAL at 21:39

## 2017-05-23 RX ADMIN — DULOXETINE SCH MG: 30 CAPSULE, DELAYED RELEASE ORAL at 11:10

## 2017-05-23 RX ADMIN — EXTENDED PHENYTOIN SODIUM SCH MG: 100 CAPSULE ORAL at 16:55

## 2017-05-23 RX ADMIN — CLOPIDOGREL BISULFATE SCH MG: 75 TABLET, FILM COATED ORAL at 11:11

## 2017-05-23 RX ADMIN — PREGABALIN SCH MG: 75 CAPSULE ORAL at 11:08

## 2017-05-23 RX ADMIN — METOPROLOL SUCCINATE SCH MG: 50 TABLET, EXTENDED RELEASE ORAL at 11:11

## 2017-05-23 RX ADMIN — CETIRIZINE HYDROCHLORIDE SCH MG: 10 TABLET, FILM COATED ORAL at 11:11

## 2017-05-23 RX ADMIN — SILVER SULFADIAZINE SCH APPLIC: 10 CREAM TOPICAL at 11:13

## 2017-05-23 RX ADMIN — FUROSEMIDE SCH MG: 80 TABLET ORAL at 11:10

## 2017-05-23 RX ADMIN — POTASSIUM CHLORIDE SCH MEQ: 750 TABLET, FILM COATED, EXTENDED RELEASE ORAL at 11:10

## 2017-05-23 RX ADMIN — AMLODIPINE BESYLATE SCH MG: 10 TABLET ORAL at 21:38

## 2017-05-23 RX ADMIN — ATORVASTATIN CALCIUM SCH MG: 20 TABLET, FILM COATED ORAL at 18:34

## 2017-05-23 RX ADMIN — CARBAMIDE PEROXIDE 6.5% OTIC SOLN SCH DROP: 6.5 SOLUTION at 21:39

## 2017-05-23 RX ADMIN — METFORMIN HYDROCHLORIDE SCH MG: 500 TABLET, FILM COATED ORAL at 11:09

## 2017-05-23 RX ADMIN — MORPHINE SULFATE PRN MG: 100 TABLET, EXTENDED RELEASE ORAL at 16:59

## 2017-05-23 RX ADMIN — ASPIRIN 325 MG ORAL TABLET SCH MG: 325 PILL ORAL at 11:08

## 2017-05-23 RX ADMIN — SPIRONOLACTONE SCH MG: 25 TABLET, FILM COATED ORAL at 11:10

## 2017-05-23 RX ADMIN — SULFASALAZINE SCH MG: 500 TABLET, DELAYED RELEASE ORAL at 21:39

## 2017-05-23 RX ADMIN — LISINOPRIL SCH MG: 5 TABLET ORAL at 11:09

## 2017-05-23 RX ADMIN — NYSTATIN SCH UNIT: 100000 SUSPENSION ORAL at 16:55

## 2017-05-23 RX ADMIN — VERAPAMIL HYDROCHLORIDE SCH MG: 120 TABLET, FILM COATED ORAL at 11:10

## 2017-05-23 RX ADMIN — NYSTATIN SCH UNIT: 100000 SUSPENSION ORAL at 18:35

## 2017-05-23 RX ADMIN — SULFASALAZINE SCH MG: 500 TABLET, DELAYED RELEASE ORAL at 16:56

## 2017-05-23 RX ADMIN — SILVER SULFADIAZINE SCH APPLIC: 10 CREAM TOPICAL at 17:01

## 2017-05-23 RX ADMIN — FAMOTIDINE SCH MG: 20 TABLET, FILM COATED ORAL at 21:39

## 2017-05-23 RX ADMIN — EXTENDED PHENYTOIN SODIUM SCH MG: 100 CAPSULE ORAL at 06:30

## 2017-05-23 RX ADMIN — NYSTATIN SCH UNIT: 100000 SUSPENSION ORAL at 21:38

## 2017-05-23 RX ADMIN — IPRATROPIUM BROMIDE AND ALBUTEROL SCH PUFF: 20; 100 SPRAY, METERED RESPIRATORY (INHALATION) at 18:34

## 2017-05-23 RX ADMIN — MONTELUKAST SODIUM SCH MG: 10 TABLET, FILM COATED ORAL at 18:34

## 2017-05-23 RX ADMIN — IPRATROPIUM BROMIDE AND ALBUTEROL SCH PUFF: 20; 100 SPRAY, METERED RESPIRATORY (INHALATION) at 06:30

## 2017-05-23 RX ADMIN — EXTENDED PHENYTOIN SODIUM SCH MG: 100 CAPSULE ORAL at 21:40

## 2017-05-23 RX ADMIN — SILVER SULFADIAZINE SCH APPLIC: 10 CREAM TOPICAL at 18:35

## 2017-05-23 RX ADMIN — PRENATAL W/O A VIT W/ FE FUMARATE-FA CAP 106.5-1 MG SCH CAP: 106.5 CAPSULE ORAL at 11:08

## 2017-05-23 RX ADMIN — DOCUSATE SODIUM SCH MG: 100 CAPSULE, LIQUID FILLED ORAL at 18:34

## 2017-05-23 RX ADMIN — DULOXETINE SCH MG: 30 CAPSULE, DELAYED RELEASE ORAL at 21:40

## 2017-05-23 RX ADMIN — CARBAMIDE PEROXIDE 6.5% OTIC SOLN SCH DROP: 6.5 SOLUTION at 17:00

## 2017-05-23 RX ADMIN — PREGABALIN SCH MG: 75 CAPSULE ORAL at 21:40

## 2017-05-23 RX ADMIN — FOLIC ACID SCH MG: 1 TABLET ORAL at 11:11

## 2017-05-23 RX ADMIN — CARBAMIDE PEROXIDE 6.5% OTIC SOLN SCH DROP: 6.5 SOLUTION at 18:35

## 2017-05-23 RX ADMIN — METFORMIN HYDROCHLORIDE SCH MG: 500 TABLET, FILM COATED ORAL at 16:55

## 2017-05-23 NOTE — PDOC PROGRESS REPORT
Subjective


Progress Note for:: 05/23/17


Subjective:: 


Patient reports prior to admission that she has a broken foot.  She reports she 

is awaiting a prosthetic appliance from orthopedics.  Nursing staff reports 

patient has no difficulty ambulating.  Currently pending rehabilitation 

placement.


Patient denies chest pain, shortness of breath, nausea, vomiting, fever, chills

, constipation, diarrhea.





Physical Exam


Vital Signs: 


 











Temp Pulse Resp BP Pulse Ox


 


 98.7 F   111 H  19   118/71   100 


 


 05/23/17 00:00  05/23/17 00:00  05/23/17 00:00  05/23/17 00:00  05/23/17 00:00








 Intake & Output











 05/22/17 05/23/17 05/24/17





 06:59 06:59 06:59


 


Intake Total 1960 2170 


 


Balance 1960 2170 











Exam: 


General: Awake alert and oriented x3, no acute respiratory distress


HEENT: AT/NC, PERRL, EOMI, oropharynx is moist, pink, no scleral icterus, no 

conjunctival injection


Neck: No JVD, trachea midline


Chest: Clear to auscultation bilaterally


CV: Regular rate and rhythm, normal S1 and S2, no murmur, rub, or gallop


Abdomen: Protuberant, soft, nontender to palpation, nondistended, active bowel 

sounds; no rebound, rigidity, or guarding


Extremities: No cyanosis, clubbing; 1+ edema


Neuro: Cranial nerves II through XII are grossly intact without focal deficits; 

awake alert and oriented x3


Psych: Normal mood and affect








Results


Laboratory Results: 


 











  05/15/17 05/16/17 05/16/17





  20:45 03:10 09:26


 


Troponin I  0.022  0.023  0.017














  05/20/17





  21:05


 


Troponin I  < 0.012











Impressions: 


 





Head CT  05/12/17 00:00


IMPRESSION:  NORMAL BRAIN CT WITHOUT CONTRAST.


 








Chest X-Ray  05/12/17 15:37


IMPRESSION:  NO ACUTE RADIOGRAPHIC FINDING IN THE CHEST.


 








Abdomen/Pelvis CT  05/12/17 21:06


IMPRESSION:  NO ACUTE FINDINGS WITHIN THE ABDOMEN OR PELVIS.  NO SIGNIFICANT 

CHANGE FROM PRIOR STUDY.


 








Extremity Ultrasound  05/18/17 14:14


IMPRESSION:  A FEW LYMPH NODES IN THE LEFT AXILLA WHICH DO NOT HAVE WORRISOME 

SONOGRAPHIC FEATURES.  NO EVIDENCE OF ABSCESS ON EITHER SIDE.


 














Assessment & Plan





- Diagnosis


(1) Chest pain


Qualifiers: 


     Chest pain type: unspecified        Qualified Code(s): R07.9 - Chest pain, 

unspecified  


Is this a current diagnosis for this admission?: YesPlan: 


Patient ruled out for acute coronary syndrome.  This is likely secondary to 

indigestion








(2) Encephalopathy acute


Is this a current diagnosis for this admission?: YesPlan: 


This has resolved and is likely secondary to polypharmacy and her CPAP.








(3) Opiate dependence, continuous


Is this a current diagnosis for this admission?: YesPlan: 


Continue home MS Contin.








(4) Polypharmacy


Is this a current diagnosis for this admission?: Yes





(5) Viral gastroenteritis


Is this a current diagnosis for this admission?: YesPlan: 


Improved.  Likely source of the patient's chest pain.








(6) Morbid obesity with BMI of 50.0-59.9, adult


Is this a current diagnosis for this admission?: Yes





(7) GOSIA (obstructive sleep apnea)


Is this a current diagnosis for this admission?: Yes





(8) SIRS (systemic inflammatory response syndrome)


Is this a current diagnosis for this admission?: Yes








- Inpatient Certification


Based on my medical assessment, after consideration of the patient's 

comorbidities, presenting symptoms, or acuity I expect that the services needed 

warrant INPATIENT care.: No


I certify that my determination is in accordance with my understanding of 

Medicare's requirements for reasonable and necessary INPATIENT services [42 CFR 

412.3e].: No


Post Hospital Care: D/C Planner Documentation





- Plan Summary


Plan Summary: 


Patient to be out of bed to chair 3 times daily.  Have ordered a rolling walker 

for patient.  She has a Rollator at home.  At this time, I am hard pressed to 

find why patient cannot be cared for at home and will have home health 

established for this patient and if she is unable to attend rehabilitation 

tomorrow we will discharge patient home with home health.

## 2017-05-24 VITALS — SYSTOLIC BLOOD PRESSURE: 99 MMHG | DIASTOLIC BLOOD PRESSURE: 59 MMHG

## 2017-05-24 RX ADMIN — EXTENDED PHENYTOIN SODIUM SCH MG: 100 CAPSULE ORAL at 16:04

## 2017-05-24 RX ADMIN — PRENATAL W/O A VIT W/ FE FUMARATE-FA CAP 106.5-1 MG SCH CAP: 106.5 CAPSULE ORAL at 10:58

## 2017-05-24 RX ADMIN — SILVER SULFADIAZINE SCH APPLIC: 10 CREAM TOPICAL at 16:02

## 2017-05-24 RX ADMIN — SULFASALAZINE SCH MG: 500 TABLET, DELAYED RELEASE ORAL at 05:48

## 2017-05-24 RX ADMIN — VERAPAMIL HYDROCHLORIDE SCH MG: 120 TABLET, FILM COATED ORAL at 10:58

## 2017-05-24 RX ADMIN — CARBAMIDE PEROXIDE 6.5% OTIC SOLN SCH DROP: 6.5 SOLUTION at 16:08

## 2017-05-24 RX ADMIN — DOCUSATE SODIUM SCH MG: 100 CAPSULE, LIQUID FILLED ORAL at 10:58

## 2017-05-24 RX ADMIN — SULFASALAZINE SCH MG: 500 TABLET, DELAYED RELEASE ORAL at 16:02

## 2017-05-24 RX ADMIN — EXTENDED PHENYTOIN SODIUM SCH MG: 100 CAPSULE ORAL at 05:48

## 2017-05-24 RX ADMIN — FUROSEMIDE SCH MG: 80 TABLET ORAL at 10:59

## 2017-05-24 RX ADMIN — SILVER SULFADIAZINE SCH APPLIC: 10 CREAM TOPICAL at 11:04

## 2017-05-24 RX ADMIN — METFORMIN HYDROCHLORIDE SCH MG: 500 TABLET, FILM COATED ORAL at 16:03

## 2017-05-24 RX ADMIN — IPRATROPIUM BROMIDE AND ALBUTEROL SCH PUFF: 20; 100 SPRAY, METERED RESPIRATORY (INHALATION) at 11:03

## 2017-05-24 RX ADMIN — CLOPIDOGREL BISULFATE SCH MG: 75 TABLET, FILM COATED ORAL at 10:59

## 2017-05-24 RX ADMIN — NYSTATIN SCH UNIT: 100000 SUSPENSION ORAL at 16:04

## 2017-05-24 RX ADMIN — METFORMIN HYDROCHLORIDE SCH MG: 500 TABLET, FILM COATED ORAL at 10:57

## 2017-05-24 RX ADMIN — IPRATROPIUM BROMIDE AND ALBUTEROL SCH PUFF: 20; 100 SPRAY, METERED RESPIRATORY (INHALATION) at 00:52

## 2017-05-24 RX ADMIN — FOLIC ACID SCH MG: 1 TABLET ORAL at 10:59

## 2017-05-24 RX ADMIN — PREDNISONE SCH MG: 10 TABLET ORAL at 10:57

## 2017-05-24 RX ADMIN — PREGABALIN SCH MG: 75 CAPSULE ORAL at 10:59

## 2017-05-24 RX ADMIN — IPRATROPIUM BROMIDE AND ALBUTEROL SCH PUFF: 20; 100 SPRAY, METERED RESPIRATORY (INHALATION) at 05:48

## 2017-05-24 RX ADMIN — NYSTATIN SCH UNIT: 100000 SUSPENSION ORAL at 10:57

## 2017-05-24 RX ADMIN — LISINOPRIL SCH MG: 5 TABLET ORAL at 10:59

## 2017-05-24 RX ADMIN — CARBAMIDE PEROXIDE 6.5% OTIC SOLN SCH DROP: 6.5 SOLUTION at 11:00

## 2017-05-24 RX ADMIN — POTASSIUM CHLORIDE SCH MEQ: 750 TABLET, FILM COATED, EXTENDED RELEASE ORAL at 10:57

## 2017-05-24 RX ADMIN — DULOXETINE SCH MG: 30 CAPSULE, DELAYED RELEASE ORAL at 10:58

## 2017-05-24 RX ADMIN — METOPROLOL SUCCINATE SCH MG: 50 TABLET, EXTENDED RELEASE ORAL at 11:03

## 2017-05-24 RX ADMIN — SPIRONOLACTONE SCH MG: 25 TABLET, FILM COATED ORAL at 10:59

## 2017-05-24 RX ADMIN — ASPIRIN 325 MG ORAL TABLET SCH MG: 325 PILL ORAL at 10:59

## 2017-05-24 RX ADMIN — CETIRIZINE HYDROCHLORIDE SCH MG: 10 TABLET, FILM COATED ORAL at 11:03

## 2017-05-24 NOTE — PDOC TRANSFER SUMMARY
General





- Admit/Disc Date/PCP


Admission Date/Primary Care Provider: 


  05/14/17 13:22





  KAYLEY CHILDS PA-C





Discharge Date: 05/24/17





- Discharge Diagnosis


(1) SIRS (systemic inflammatory response syndrome)


Is this a current diagnosis for this admission?: Yes





(2) Polypharmacy


Is this a current diagnosis for this admission?: Yes





(3) Chest pain


Is this a current diagnosis for this admission?: Yes





(4) Encephalopathy acute


Is this a current diagnosis for this admission?: Yes





(5) Opiate dependence, continuous


Is this a current diagnosis for this admission?: Yes





(6) Viral gastroenteritis


Is this a current diagnosis for this admission?: Yes





(7) GOSIA (obstructive sleep apnea)


Is this a current diagnosis for this admission?: Yes





(8) Morbid obesity with BMI of 50.0-59.9, adult


Is this a current diagnosis for this admission?: Yes





(9) Rheumatoid arthritis


Is this a current diagnosis for this admission?: Yes





(10) Sinus tachycardia


Is this a current diagnosis for this admission?: Yes








- Additional Information


Resuscitation Status: Full Code


Discharge Diet: Cardiac, Diabetic


Discharge Activity: Activity As Tolerated


Home Medications: 








Albuterol Sulfate [Ventolin Hfa] 2 puff IH Q4 PRN 05/13/17 


Amlodipine Besylate [Norvasc 10 mg Tablet] 10 mg PO DAILY 05/13/17 


Atorvastatin Calcium [Lipitor 20 mg Tablet] 20 mg PO DAILY 05/13/17 


Celecoxib [Celebrex 200 mg Capsule] 200 mg PO Q12 05/13/17 


Cetirizine HCl [Zyrtec 10 mg Tablet] 10 mg PO DAILY 05/13/17 


Cevimeline HCl [Evoxac] 30 mg PO TID 05/13/17 


Clopidogrel Bisulfate [Plavix 75 mg Tablet] 75 mg PO DAILY 05/13/17 


Docusate Sodium [Dok] 100 mg PO BID 05/13/17 


Duloxetine HCl [Cymbalta] 60 mg PO Q12 05/13/17 


Ergocalciferol (Vitamin D2) [Drisdol 50,000 unit (1.25MG) Capsule] 50,000 unit 

PO [id number] 05/13/17 


Famotidine [Pepcid 40 mg Tablet] 40 mg PO QHS 05/13/17 


Folic Acid [Folvite 1 mg Tablet] 1 mg PO DAILY 05/13/17 


Furosemide [Lasix 80 mg Tablet] 80 mg PO QAM 05/13/17 


Ketorolac Tromethamine [Toradol 10 mg Tablet] 10 mg PO Q6HP PRN 05/13/17 


Lisinopril [Prinivil 5 mg Tablet] 5 mg PO DAILY 05/13/17 


Metformin HCl [Glucophage] 1,000 mg PO [id number] PRN 05/13/17 


Methotrexate Sodium [Methotrexate] 15 mg PO TH@1000 05/13/17 


Metoprolol Succinate [Toprol  mg Tablet] 100 mg PO Q12 05/13/17 


Montelukast Sodium [Singulair 10 mg Tablet] 10 mg PO QPM 05/13/17 


Mupirocin [Bactroban 2% Ointment 22 gm] 1 applic NASL PRN PRN 05/13/17 


Pnv with Ca,No.72/Iron/FA [Pnv Prenatal Plus Multivit Tab] 1 each PO DAILY 05/13 /17 


Potassium Chloride 20 meq PO DAILY 05/13/17 


Pregabalin [Lyrica] 150 mg PO Q12 05/13/17 


Spironolactone [Aldactone 25 mg Tablet] 25 mg PO DAILY 05/13/17 


Sulfasalazine [Sulfazine] 500 mg PO TID 05/13/17 


Tizanidine HCl [Zanaflex 4 mg Tablet] 4 mg PO Q8HP PRN 05/13/17 


Verapamil HCl [Calan 120 mg Tablet] 120 mg PO DAILY 05/13/17 


Etanercept [Enbrel] 50 mg SUBCUT 6XD 05/16/17 


Carbamide Peroxide [Debrox 6.5 % Otic Drops 15 ml] 4 drop AS QID  bottle 05/24/ 17 


Morphine Sulfate [Ms-Contin Sr 100 mg Tablet] 100 mg PO Q12HP PRN #14 tablet.sa 

05/24/17 


Nystatin [Mycostatin 500,000 Unit/5 ml Susp Udcup] 500,000 unit PO QID  udc 05/ 24/17 


Prednisone [Deltasone 10 mg Tablet] 10 mg PO [id number]  tablet 05/24/17 


Silver Sulfadiazine [Silvadene 1% Cream 25 gm] 1 applic TP TID  tube 05/24/17 











History of Present Illness


Admission Date/PCP: 


  05/14/17 13:22





  KAYLEY CHILDS PA-C





History of Present Illness: 


KALIE DANIEL is a 56 year old female with a complex past medical history 

including lupus, rheumatoid arthritis, congestive heart failure, diabetes, 

Morbid obesity, diabetes, hypertension, dyslipidemia, opiate dependent chronic 

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 

renal failure.  Patient was brought to the emergency room after 2 hours of 

altered mental status noted by home health assistant.  In the emergency room 

she was oriented only 1, with fever, hypertension and tachycardia.  She 

otherwise has an unremarkable workup including CT of the head, abdomen and 

pelvis, chest x-ray and lumbar puncture.  Subsequently patient complained of 

chest pain. 














Hospital Course


Hospital Course: 


Patient was admitted to a continuous telemetry unit and serial cardiac enzymes 

were obtained, which were not suggestive.  Patient remained afebrile after 

admission.  Patient had no events on telemetry, but was noted to have 

significant chest pain and sinus tachycardia.  Dr. JANSEN cardiology was 

consulted.  Patient underwent cardiac stress test which was unremarkable for 

any reversible ischemia she does have a large area of uptake in her right 

axilla which does note a enlarged lymph node, but radiology did not feel that 

this was worrisome.  An ultrasound was performed of this area.  Patient is 

advised to follow this in an outpatient setting.  Patient's symptoms of 

gastroenteritis have completely resolved.  Her mentation is at baseline.  And 

she has been ready for discharge to rehab for the past 5 days.  Patient will 

need to use CPAP nightly and also may use her own home medications.





Physical Exam


Vital Signs: 


 











Temp Pulse Resp BP Pulse Ox


 


 98.3 F   112 H  21 H  121/70   100 


 


 05/24/17 00:00  05/24/17 00:00  05/24/17 00:00  05/24/17 00:00  05/24/17 00:00








 Intake & Output











 05/23/17 05/24/17 05/25/17





 06:59 06:59 06:59


 


Intake Total 2170 953 


 


Balance 2170 953 


 


Weight  137.8 kg 











Exam: 


General: Awake alert and oriented x3, no acute respiratory distress


HEENT: AT/NC, PERRL, EOMI, oropharynx is moist, pink, no scleral icterus, no 

conjunctival injection


Neck: No JVD, trachea midline


Chest: Clear to auscultation bilaterally


CV: Regular rate and rhythm, normal S1 and S2, no murmur, rub, or gallop


Abdomen: Protuberant, soft, nontender to palpation, nondistended, active bowel 

sounds; no rebound, rigidity, or guarding


Extremities: No cyanosis, clubbing; 1+ edema


Neuro: Cranial nerves II through XII are grossly intact without focal deficits; 

awake alert and oriented x3


Psych: Normal mood and affect





Results


Laboratory Results: 


 











  05/15/17 05/16/17 05/16/17





  20:45 03:10 09:26


 


Troponin I  0.022  0.023  0.017














  05/20/17





  21:05


 


Troponin I  < 0.012











Impressions: 


 





Head CT  05/12/17 00:00


IMPRESSION:  NORMAL BRAIN CT WITHOUT CONTRAST.


 








Chest X-Ray  05/12/17 15:37


IMPRESSION:  NO ACUTE RADIOGRAPHIC FINDING IN THE CHEST.


 








Abdomen/Pelvis CT  05/12/17 21:06


IMPRESSION:  NO ACUTE FINDINGS WITHIN THE ABDOMEN OR PELVIS.  NO SIGNIFICANT 

CHANGE FROM PRIOR STUDY.


 








Extremity Ultrasound  05/18/17 14:14


IMPRESSION:  A FEW LYMPH NODES IN THE LEFT AXILLA WHICH DO NOT HAVE WORRISOME 

SONOGRAPHIC FEATURES.  NO EVIDENCE OF ABSCESS ON EITHER SIDE.


 














Transfer Plan





- Time Spent with Patient


Time spent with patient: Less than 30 Minutes





Qualifiers


**PATEINT BEING DISCHARGED WITH ANY OF THE FOLLOWING DIAGNOSIS?: No





Plan


Time Spent: Less than 30 Minutes

## 2017-06-09 NOTE — PDOC H&P
History of Present Illness


Admission Date/PCP: 


  17 22:47





  KAYLEY CHILDS PA-C





Patient complains of: Altered mental status


History of Present Illness: 


KALIE DANIEL is a 56 year old female with a complex past medical history 

including lupus, rheumatoid arthritis, congestive heart failure, diabetes, 

Morbid obesity, diabetes, hypertension, dyslipidemia, opiate dependent chronic 

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 

renal failure.  Patient was brought to the emergency room after 2 hours of 

altered mental status noted by home health assistant.  In the emergency room 

she is oriented only 1, with fever, hypertension and tachycardia.  She 

otherwise has an unremarkable workup including CT of the head, abdomen and 

pelvis, chest x-ray and lumbar puncture.  She's referred to the hospitalist for 

admission.  








Past Medical History


Cardiac Medical History: Reports: Hyperlipidema, Hypertension, Heart Murmur


   Denies: Atrial Fibrillation, Congestive Heart Failure, Coronary Artery 

Disease, Myocardial Infarction, Peripheral Vascular Disease, Pulmonary Embolism


Pulmonary Medical History: Reports: Asthma, Bronchitis, Pneumonia, Sleep Apnea


   Denies: Chronic Obstructive Pulmonary Disease (COPD), Respiratory Failure, 

Tuberculosis


Neurological Medical History: 


   Denies: Seizures


Endocrine Medical History: Reports: Diabetes Mellitus Type 2, Obesity - BMI 57


   Denies: Hyperthyroidism, Hypothyroidism


Malignancy Medical History: 


   Denies: Leukemia, Lung Cancer


GI Medical History: Reports: Crohn's Disease, Hiatal Hernia


   Denies: Gastroesophageal Reflux Disease


Musculoskeltal Medical History: Reports: Arthritis, Fibromyalgia


Psychiatric Medical History: Reports: Depression, Personality Disorder


   Denies: Bipolar Disorder, Dementia, Post Traumatic Stress Disorder


Hematology: Reports: Anemia


   Denies: Hemophilia, Sickle Cell Disease


Infectious Medical History: 


   Denies: HIV





Past Surgical History


Past Surgical History: Reports:  Section, Hysterectomy


   Denies: Amputation, Appendectomy, Cholecystectomy, Colostomy, Coronary 

Artery Bypass Graft, Gastric Bypass Surgery, Herniorrhaphy, Mastectomy, 

Pacemaker, Tonsillectomy, Tubal Ligation





Social History


Information Source: [facility] Records


Lives with: Alone


Smoking Status: Never Smoker


Frequency of Alcohol Use: None


Hx Recreational Drug Use: No


Drugs: None


Hx Prescription Drug Abuse: Yes - 2016





- Advance Directive


Resuscitation Status: Full Code





Family History


Family History: DM, Hypertension


Parental Family History Reviewed: Yes


Children Family History Reviewed: Yes


Sibling(s) Family History Reviewed.: Yes





Medication/Allergy


Home Medications: 








Aspirin [Aspirin 325 mg Tablet] 325 mg PO DAILY 16 


Celecoxib 200 mg PO BIDBS 16 


Cetirizine HCl [Zyrtec 10 mg Tablet] 10 mg PO QPM 16 


Clopidogrel Bisulfate [Clopidogrel] 75 mg PO DAILY 16 


Famotidine 40 mg PO QHS 16 


Folic Acid [Folvite 1 mg Tablet] 1 mg PO DAILY 16 


Furosemide 80 mg PO DAILY 16 


Ipratropium/Albuterol Sulfate [Combivent Respimat Inhal Spray] 1 puff IH QID  


Lisinopril 5 mg PO QAM 16 


Metformin HCl [Glucophage] 1,000 mg PO BIDBS 16 


Methotrexate Sodium [Methotrexate] 15 mg PO ASDIR PRN 16 


Metoprolol Tartrate 25 mg PO Q12 16 


Montelukast Sodium 10 mg PO QAM 16 


Morphine Sulfate [Morphine Sulfate ER] 100 mg PO Q12HP PRN 16 


Pnv with Ca,No.72/Iron/FA [Prenatal Plus Tablet] 1 tab PO DAILY 16 


Prednisone 10 mg PO [id number] 16 


Pregabalin [Lyrica] 150 mg PO Q12 16 


Spironolactone 25 mg PO QAM 16 


Amlodipine Besylate [Norvasc 10 mg Tablet] 5 mg PO DAILY 17 


Atorvastatin Calcium [Lipitor 20 mg Tablet] 20 mg PO QPM 17 


Cevimeline HCl 1 cap PO TID 17 


Dicyclomine HCl 20 mg PO TID 17 


Duloxetine HCl [Cymbalta] 60 mg PO BID 17 


Ergocalciferol (Vitamin D2) [Vitamin D2] 1 tab PO [id number] 17 


Phenytoin Sodium Extended 100 mg PO BID 17 


Potassium Chloride 20 meq PO DAILY 17 


Silver Sulfadiazine [Silvadene 1% Cream 25 gm] 1 applic TP TID 17 








Allergies/Adverse Reactions: 


 





ibuprofen [Ibuprofen] Allergy (Severe, Verified 16 14:54)


 Chest pain











Review of Systems


ROS unobtainable: Due to mental status - Oriented 1 only





Physical Exam


Vital Signs: 


 











Temp Pulse Resp BP Pulse Ox


 


 98.8 F   131 H  21 H  171/77 H  100 


 


 17 03:51  17 03:51  17 03:51  17 03:51  17 03:51








 Intake & Output











 17





 11:59 11:59 11:59


 


Weight   137.6 kg











General appearance: PRESENT: no acute distress, disheveled, obese.  ABSENT: 

mild distress, morbidly obese


Head exam: PRESENT: atraumatic, normocephalic


Eye exam: PRESENT: EOMI, PERRLA, other - Pupils 5 mm symmetric and sluggishly 

reactive.  ABSENT: scleral icterus


Ear exam: PRESENT: normal external ear exam


Mouth exam: PRESENT: moist, neck supple, tongue midline


Neck exam: ABSENT: carotid bruit, JVD, lymphadenopathy, thyromegaly


Respiratory exam: PRESENT: clear to auscultation kaley.  ABSENT: rales, rhonchi, 

wheezes


Cardiovascular exam: PRESENT: RRR.  ABSENT: diastolic murmur, rubs, systolic 

murmur


Pulses: PRESENT: normal dorsalis pedis pul


Vascular exam: PRESENT: normal capillary refill


GI/Abdominal exam: PRESENT: normal bowel sounds, soft.  ABSENT: distended, 

guarding, mass, organolmegaly, rebound, tenderness


Rectal exam: PRESENT: deferred


Extremities exam: PRESENT: full ROM.  ABSENT: calf tenderness, clubbing, pedal 

edema


Neurological exam: PRESENT: altered, awake, oriented to person, CN II-XII 

grossly intact.  ABSENT: oriented to place, oriented to time, oriented to 

situation, reflexes normal


Psychiatric exam: PRESENT: flat affect


Skin exam: PRESENT: dry, intact, warm.  ABSENT: cyanosis, rash





Results


Impressions: 


 





Head CT  17 00:00


IMPRESSION:  NORMAL BRAIN CT WITHOUT CONTRAST.


 








Chest X-Ray  17 15:37


IMPRESSION:  NO ACUTE RADIOGRAPHIC FINDING IN THE CHEST.


 








Abdomen/Pelvis CT  17 21:06


IMPRESSION:  NO ACUTE FINDINGS WITHIN THE ABDOMEN OR PELVIS.  NO SIGNIFICANT 

CHANGE FROM PRIOR STUDY.


 














Assessment & Plan





- Diagnosis


(1) Encephalopathy acute


Is this a current diagnosis for this admission?: YesPlan: 


Acute problem unclear cause unremarkable workup for infection most likely 

polypharmacy follow-up CBC chemistry an ESR








(2) Polypharmacy


Is this a current diagnosis for this admission?: YesPlan: 





Recent history of polypharmacy requiring admission for rhabdomyolysis and acute 

renal failure.  Patient is on morphine 100 twice a day, Lyrica, Bentyl, 

phenytoin.  I'll obtain a phenytoin level and otherwise supportive care 

resuming reduce dose morphine to avoid rowdy withdrawal








(3) Chronic pain





Is this a current diagnosis for this admission?: YesPlan: 


Resume low-dose morphine to avoid withdrawal








(4) Morbid obesity with BMI of 50.0-59.9, adult


Is this a current diagnosis for this admission?: YesPlan: 


Supportive care and BiPAP obtain TSH consider dietitian consult








(5) GOSIA (obstructive sleep apnea)


Is this a current diagnosis for this admission?: YesPlan: 


BiPAP support while asleep











- Time


Time Spent: 50 to 70 Minutes





- Inpatient Certification


Medical Necessity: Need Close Monitoring Due to Risk of Patient Decompensation

## 2017-06-18 ENCOUNTER — HOSPITAL ENCOUNTER (INPATIENT)
Dept: HOSPITAL 62 - ER | Age: 57
LOS: 8 days | Discharge: NURSING, REHAB OR LONG-TERM CARE | DRG: 371 | End: 2017-06-26
Attending: FAMILY MEDICINE | Admitting: FAMILY MEDICINE
Payer: MEDICARE

## 2017-06-18 DIAGNOSIS — Z79.899: ICD-10-CM

## 2017-06-18 DIAGNOSIS — M06.9: ICD-10-CM
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
## 2017-06-18 DIAGNOSIS — Z90.710: ICD-10-CM

## 2017-06-18 DIAGNOSIS — M79.7: ICD-10-CM

## 2017-06-18 DIAGNOSIS — G47.33: ICD-10-CM

## 2017-06-18 DIAGNOSIS — F32.9: ICD-10-CM

## 2017-06-18 DIAGNOSIS — N17.9: ICD-10-CM

## 2017-06-18 DIAGNOSIS — Z88.6: ICD-10-CM
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
## 2017-06-18 DIAGNOSIS — G89.4: ICD-10-CM

## 2017-06-18 DIAGNOSIS — E78.5: ICD-10-CM

## 2017-06-18 DIAGNOSIS — I10: ICD-10-CM

## 2017-06-18 DIAGNOSIS — I69.354: ICD-10-CM

## 2017-06-18 DIAGNOSIS — Z79.4: ICD-10-CM

## 2017-06-18 DIAGNOSIS — R10.2: ICD-10-CM

## 2017-06-18 DIAGNOSIS — I95.9: ICD-10-CM

## 2017-06-18 DIAGNOSIS — R29.810: ICD-10-CM

## 2017-06-18 DIAGNOSIS — G93.40: ICD-10-CM

## 2017-06-18 DIAGNOSIS — I50.32: ICD-10-CM
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
## 2017-06-18 DIAGNOSIS — M19.90: ICD-10-CM

## 2017-06-18 DIAGNOSIS — E11.9: ICD-10-CM
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
## 2017-06-18 DIAGNOSIS — A04.7: Primary | ICD-10-CM

## 2017-06-18 DIAGNOSIS — D64.9: ICD-10-CM

## 2017-06-18 LAB
ADD ON TESTING BLD IN LAB: (no result)
ALBUMIN SERPL-MCNC: 3.4 G/DL (ref 3.5–5)
ALP SERPL-CCNC: 78 U/L (ref 38–126)
ALT SERPL-CCNC: 35 U/L (ref 9–52)
ANION GAP SERPL CALC-SCNC: 12 MMOL/L (ref 5–19)
APPEARANCE UR: (no result)
APTT BLD: 27.5 SEC (ref 23.5–35.8)
AST SERPL-CCNC: 40 U/L (ref 14–36)
BARBITURATES UR QL SCN: NEGATIVE
BASE EXCESS BLDV CALC-SCNC: -2.6 MMOL/L
BASOPHILS # BLD AUTO: 0.1 10^3/UL (ref 0–0.2)
BASOPHILS NFR BLD AUTO: 0.6 % (ref 0–2)
BILIRUB DIRECT SERPL-MCNC: 0.4 MG/DL (ref 0–0.4)
BILIRUB SERPL-MCNC: 0.4 MG/DL (ref 0.2–1.3)
BILIRUB UR QL STRIP: NEGATIVE
BUN SERPL-MCNC: 50 MG/DL (ref 7–20)
CALCIUM: 8.7 MG/DL (ref 8.4–10.2)
CHLORIDE SERPL-SCNC: 102 MMOL/L (ref 98–107)
CK MB SERPL-MCNC: 3.9 NG/ML (ref ?–4.55)
CK SERPL-CCNC: 778 U/L (ref 30–135)
CO2 SERPL-SCNC: 23 MMOL/L (ref 22–30)
CREAT SERPL-MCNC: 2.43 MG/DL (ref 0.52–1.25)
EOSINOPHIL # BLD AUTO: 0.1 10^3/UL (ref 0–0.6)
EOSINOPHIL NFR BLD AUTO: 0.9 % (ref 0–6)
ERYTHROCYTE [DISTWIDTH] IN BLOOD BY AUTOMATED COUNT: 17.2 % (ref 11.5–14)
ETHANOL SERPL-MCNC: < 10 MG/DL
GLUCOSE SERPL-MCNC: 123 MG/DL (ref 75–110)
GLUCOSE UR STRIP-MCNC: NEGATIVE MG/DL
HCO3 BLDV-SCNC: 25.2 MMOL/L (ref 20–32)
HCT VFR BLD CALC: 30.5 % (ref 36–47)
HGB BLD-MCNC: 9.5 G/DL (ref 12–15.5)
HGB HCT DIFFERENCE: -2
KETONES UR STRIP-MCNC: NEGATIVE MG/DL
LYMPHOCYTES # BLD AUTO: 1.3 10^3/UL (ref 0.5–4.7)
LYMPHOCYTES NFR BLD AUTO: 14.3 % (ref 13–45)
MAGNESIUM SERPL-MCNC: 2.1 MG/DL (ref 1.6–2.3)
MCH RBC QN AUTO: 24.3 PG (ref 27–33.4)
MCHC RBC AUTO-ENTMCNC: 31.1 G/DL (ref 32–36)
MCV RBC AUTO: 78 FL (ref 80–97)
METHADONE UR QL SCN: NEGATIVE
MONOCYTES # BLD AUTO: 0.7 10^3/UL (ref 0.1–1.4)
MONOCYTES NFR BLD AUTO: 7 % (ref 3–13)
NEUTROPHILS # BLD AUTO: 7.3 10^3/UL (ref 1.7–8.2)
NEUTS SEG NFR BLD AUTO: 77.2 % (ref 42–78)
NITRITE UR QL STRIP: NEGATIVE
PCO2 BLDV: 60.4 MMHG (ref 35–63)
PCP UR QL SCN: NEGATIVE
PH BLDV: 7.24 [PH] (ref 7.3–7.42)
PH UR STRIP: 5 [PH] (ref 5–9)
POTASSIUM SERPL-SCNC: 4.5 MMOL/L (ref 3.6–5)
PROT SERPL-MCNC: 6.6 G/DL (ref 6.3–8.2)
PROT UR STRIP-MCNC: NEGATIVE MG/DL
PROTHROMBIN TIME: 14.1 SEC (ref 11.4–15.4)
RBC # BLD AUTO: 3.9 10^6/UL (ref 3.72–5.28)
SODIUM SERPL-SCNC: 136.5 MMOL/L (ref 137–145)
SP GR UR STRIP: 1.01
T3FREE SERPL-MCNC: 3.81 PG/ML (ref 2.77–5.27)
TROPONIN I SERPL-MCNC: < 0.012 NG/ML
URINE OPIATES LOW: (no result)
UROBILINOGEN UR-MCNC: NEGATIVE MG/DL (ref ?–2)
WBC # BLD AUTO: 9.4 10^3/UL (ref 4–10.5)

## 2017-06-18 PROCEDURE — 83735 ASSAY OF MAGNESIUM: CPT

## 2017-06-18 PROCEDURE — 80307 DRUG TEST PRSMV CHEM ANLYZR: CPT

## 2017-06-18 PROCEDURE — 70450 CT HEAD/BRAIN W/O DYE: CPT

## 2017-06-18 PROCEDURE — 84439 ASSAY OF FREE THYROXINE: CPT

## 2017-06-18 PROCEDURE — 85025 COMPLETE CBC W/AUTO DIFF WBC: CPT

## 2017-06-18 PROCEDURE — A9577 INJ MULTIHANCE: HCPCS

## 2017-06-18 PROCEDURE — 81001 URINALYSIS AUTO W/SCOPE: CPT

## 2017-06-18 PROCEDURE — 85610 PROTHROMBIN TIME: CPT

## 2017-06-18 PROCEDURE — 94660 CPAP INITIATION&MGMT: CPT

## 2017-06-18 PROCEDURE — 84443 ASSAY THYROID STIM HORMONE: CPT

## 2017-06-18 PROCEDURE — 51702 INSERT TEMP BLADDER CATH: CPT

## 2017-06-18 PROCEDURE — 82803 BLOOD GASES ANY COMBINATION: CPT

## 2017-06-18 PROCEDURE — 82962 GLUCOSE BLOOD TEST: CPT

## 2017-06-18 PROCEDURE — 84484 ASSAY OF TROPONIN QUANT: CPT

## 2017-06-18 PROCEDURE — 70544 MR ANGIOGRAPHY HEAD W/O DYE: CPT

## 2017-06-18 PROCEDURE — 93010 ELECTROCARDIOGRAM REPORT: CPT

## 2017-06-18 PROCEDURE — 82553 CREATINE MB FRACTION: CPT

## 2017-06-18 PROCEDURE — 80048 BASIC METABOLIC PNL TOTAL CA: CPT

## 2017-06-18 PROCEDURE — 80053 COMPREHEN METABOLIC PANEL: CPT

## 2017-06-18 PROCEDURE — 94799 UNLISTED PULMONARY SVC/PX: CPT

## 2017-06-18 PROCEDURE — 93880 EXTRACRANIAL BILAT STUDY: CPT

## 2017-06-18 PROCEDURE — 96374 THER/PROPH/DIAG INJ IV PUSH: CPT

## 2017-06-18 PROCEDURE — 84481 FREE ASSAY (FT-3): CPT

## 2017-06-18 PROCEDURE — 82550 ASSAY OF CK (CPK): CPT

## 2017-06-18 PROCEDURE — 36415 COLL VENOUS BLD VENIPUNCTURE: CPT

## 2017-06-18 PROCEDURE — 93005 ELECTROCARDIOGRAM TRACING: CPT

## 2017-06-18 PROCEDURE — 71010: CPT

## 2017-06-18 PROCEDURE — 85730 THROMBOPLASTIN TIME PARTIAL: CPT

## 2017-06-18 PROCEDURE — 85027 COMPLETE CBC AUTOMATED: CPT

## 2017-06-18 PROCEDURE — 83605 ASSAY OF LACTIC ACID: CPT

## 2017-06-18 PROCEDURE — 99291 CRITICAL CARE FIRST HOUR: CPT

## 2017-06-18 PROCEDURE — 87493 C DIFF AMPLIFIED PROBE: CPT

## 2017-06-18 PROCEDURE — 70553 MRI BRAIN STEM W/O & W/DYE: CPT

## 2017-06-18 NOTE — RADIOLOGY REPORT (SQ)
EXAM DESCRIPTION:  CHEST SINGLE VIEW



COMPLETED DATE/TIME:  6/18/2017 5:34 pm



REASON FOR STUDY:  fatigue, possible stroke



COMPARISON:  12/20/2016



EXAM PARAMETERS:  NUMBER OF VIEWS: One view.

TECHNIQUE: Single frontal radiographic view of the chest acquired.

RADIATION DOSE: NA

LIMITATIONS: None.



FINDINGS:  LUNGS AND PLEURA: Decreased lung volumes.  No focal infiltrates.  No pneumothorax.  No sig
nificant effusion.

MEDIASTINUM AND HILAR STRUCTURES: No masses.  Contour normal.

HEART AND VASCULAR STRUCTURES: Cardiac size is stable.  No pulmonary vascular congestion.  There is s
ome vascular crowding due to low lung volumes.

BONES: No acute findings.

HARDWARE: None in the chest.

OTHER: No other significant finding.



IMPRESSION:  Small lung volumes with vascular crowding.  No focal infiltrates.



TECHNICAL DOCUMENTATION:  JOB ID:  [id number]

## 2017-06-18 NOTE — RADIOLOGY REPORT (SQ)
EXAM DESCRIPTION:  HIP LEFT AP/LATERAL



COMPLETED DATE/TIME:  6/18/2017 5:34 pm



REASON FOR STUDY:  fall, left leg pain, wkns



COMPARISON:  None.



NUMBER OF VIEWS:  Two views.



TECHNIQUE:  AP pelvis and additional frog-leg view of the left hip.



LIMITATIONS:  None.



FINDINGS:  MINERALIZATION: Normal.

LEFT HIP: No fracture or dislocation.  No worrisome bone lesions.

RIGHT HIP: No fracture or dislocation.  No worrisome bone lesions.

PUBIS AND ISCHIUM: No fracture.

PELVIS: No fracture.

SACRUM: No fracture or dislocation. No worrisome bone lesions.

LOWER LUMBAR SPINE: No fracture or dislocation. No worrisome bone lesions.  No significant disc disea
se.

SOFT TISSUES: No findings.

OTHER: No other significant finding.



IMPRESSION:  NEGATIVE STUDY OF THE LEFT HIP AND PELVIS. NO RADIOGRAPHIC EVIDENCE OF ACUTE INJURY.



TECHNICAL DOCUMENTATION:  JOB ID:  [id number]

 2011 [employer]- All Rights Reserved

## 2017-06-18 NOTE — RADIOLOGY REPORT (SQ)
EXAM DESCRIPTION:  CT HEAD WITHOUT



COMPLETED DATE/TIME:  6/18/2017 4:44 pm



REASON FOR STUDY:  ams



COMPARISON:  5/12/2017



TECHNIQUE:  Axial images acquired through the brain without intravenous contrast.  Images reviewed wi
th bone, brain and subdural windows.  Images stored on PACS.

All CT scanners at this facility use dose modulation, iterative reconstruction, and/or weight based d
osing when appropriate to reduce radiation dose to as low as reasonably achievable (ALARA).

[facility]: Dose Right  [facility]: CareDose    [facility]: Dose Right    [facility]: Teradose 4D    [facility]: Smart Technologies



RADIATION DOSE:  62.95 mGy.



LIMITATIONS:  None.



FINDINGS:  VENTRICLES: Normal size and contour.

CEREBRUM: No masses.  No hemorrhage.  No midline shift.  Normal gray/white matter differentiation.  N
o evidence for acute infarction.

CEREBELLUM: No masses.  No hemorrhage.  No alteration of density.  No evidence for acute infarction.

EXTRAAXIAL SPACES: No fluid collections.  No masses.

ORBITS AND GLOBE: No intra- or extraconal masses.  Normal contour of globe without masses.

CALVARIUM: No fracture.

PARANASAL SINUSES: No fluid or mucosal thickening.

SOFT TISSUES: No mass or hematoma.

OTHER: No other significant finding.



IMPRESSION:  No acute intracranial abnormality.



TECHNICAL DOCUMENTATION:  JOB ID:  [id number]

Quality ID # [id number]: Final reports with documentation of one or more dose reduction techniques (e.g., [place]
tomated exposure control, adjustment of the mA and/or kV according to patient size, use of iterative 
reconstruction technique)

 2011 [employer]- All Rights Reserved

## 2017-06-19 LAB
ANION GAP SERPL CALC-SCNC: 11 MMOL/L (ref 5–19)
ANION GAP SERPL CALC-SCNC: 9 MMOL/L (ref 5–19)
BASE EXCESS BLDV CALC-SCNC: -1.5 MMOL/L
BASE EXCESS BLDV CALC-SCNC: -2.5 MMOL/L
BUN SERPL-MCNC: 34 MG/DL (ref 7–20)
BUN SERPL-MCNC: 42 MG/DL (ref 7–20)
CALCIUM: 9 MG/DL (ref 8.4–10.2)
CALCIUM: 9.2 MG/DL (ref 8.4–10.2)
CHLORIDE SERPL-SCNC: 107 MMOL/L (ref 98–107)
CHLORIDE SERPL-SCNC: 109 MMOL/L (ref 98–107)
CO2 SERPL-SCNC: 20 MMOL/L (ref 22–30)
CO2 SERPL-SCNC: 23 MMOL/L (ref 22–30)
CREAT SERPL-MCNC: 1.14 MG/DL (ref 0.52–1.25)
CREAT SERPL-MCNC: 1.63 MG/DL (ref 0.52–1.25)
ERYTHROCYTE [DISTWIDTH] IN BLOOD BY AUTOMATED COUNT: 17.3 % (ref 11.5–14)
GLUCOSE SERPL-MCNC: 126 MG/DL (ref 75–110)
GLUCOSE SERPL-MCNC: 151 MG/DL (ref 75–110)
HCO3 BLDV-SCNC: 24.9 MMOL/L (ref 20–32)
HCO3 BLDV-SCNC: 26.3 MMOL/L (ref 20–32)
HCT VFR BLD CALC: 31.5 % (ref 36–47)
HGB BLD-MCNC: 9.8 G/DL (ref 12–15.5)
HGB HCT DIFFERENCE: -2.1
MCH RBC QN AUTO: 24.2 PG (ref 27–33.4)
MCHC RBC AUTO-ENTMCNC: 31.2 G/DL (ref 32–36)
MCV RBC AUTO: 78 FL (ref 80–97)
PCO2 BLDV: 54.4 MMHG (ref 35–63)
PCO2 BLDV: 60.6 MMHG (ref 35–63)
PH BLDV: 7.26 [PH] (ref 7.3–7.42)
PH BLDV: 7.28 [PH] (ref 7.3–7.42)
POTASSIUM SERPL-SCNC: 4.5 MMOL/L (ref 3.6–5)
POTASSIUM SERPL-SCNC: 4.6 MMOL/L (ref 3.6–5)
RBC # BLD AUTO: 4.05 10^6/UL (ref 3.72–5.28)
SODIUM SERPL-SCNC: 138.9 MMOL/L (ref 137–145)
SODIUM SERPL-SCNC: 139.7 MMOL/L (ref 137–145)
WBC # BLD AUTO: 8.3 10^3/UL (ref 4–10.5)

## 2017-06-19 RX ADMIN — Medication SCH ML: at 21:07

## 2017-06-19 RX ADMIN — MONTELUKAST SODIUM SCH MG: 10 TABLET, FILM COATED ORAL at 21:10

## 2017-06-19 RX ADMIN — Medication SCH ML: at 05:12

## 2017-06-19 RX ADMIN — ASPIRIN 325 MG ORAL TABLET SCH MG: 325 PILL ORAL at 09:38

## 2017-06-19 RX ADMIN — HEPARIN SODIUM SCH UNIT: 5000 INJECTION, SOLUTION INTRAVENOUS; SUBCUTANEOUS at 21:08

## 2017-06-19 RX ADMIN — HEPARIN SODIUM SCH UNIT: 5000 INJECTION, SOLUTION INTRAVENOUS; SUBCUTANEOUS at 05:12

## 2017-06-19 RX ADMIN — HEPARIN SODIUM SCH UNIT: 5000 INJECTION, SOLUTION INTRAVENOUS; SUBCUTANEOUS at 15:18

## 2017-06-19 RX ADMIN — Medication SCH ML: at 15:24

## 2017-06-19 RX ADMIN — ATORVASTATIN CALCIUM SCH MG: 40 TABLET, FILM COATED ORAL at 21:09

## 2017-06-19 NOTE — PDOC PROGRESS REPORT
Subjective


Progress Note for:: 06/19/17


Subjective:: 


Patient seen on morning rounds.  She is resting in bed at the present time.  

She awakens easily to verbal stimuli.  She is able to move all extremities 4, 

she has mild weakness in the left lower extremity chronically.  She has no 

neurologic deficits at the present time.  She does respond slowly to questions.

  She apparently went home from rehab on Tuesday of this week.  She apparently 

did not take her CPAP on with her.  She first said she thinks it was stolen.  

She has been receiving home health otherwise she lives alone.  She denies any 

complaints at present time.  She denies any shortness of breath, dyspnea or 

cough.  She denies any chest pain, headache or dizziness.  She denies any nausea

, vomiting or diarrhea.  She states she is hungry and thirsty.  Rest of the 

review of systems are negative.





Physical Exam


Vital Signs: 


 











Temp Pulse Resp BP Pulse Ox


 


 99.0 F   116 H  16   125/67   95 


 


 06/19/17 07:42  06/19/17 08:00  06/19/17 08:00  06/19/17 08:00  06/19/17 08:00








 Intake & Output











 06/18/17 06/19/17 06/20/17





 06:59 06:59 06:59


 


Intake Total  535 


 


Output Total  1200 


 


Balance  -665 


 


Weight  148.6 kg 











General appearance: PRESENT: no acute distress, morbidly obese, well-developed, 

well-nourished


Head exam: PRESENT: atraumatic, normocephalic


Eye exam: PRESENT: conjunctiva pink, EOMI, PERRLA.  ABSENT: scleral icterus


Ear exam: PRESENT: normal external ear exam


Mouth exam: PRESENT: dry mucosa, neck supple, tongue midline


Neck exam: ABSENT: carotid bruit, JVD, lymphadenopathy, thyromegaly


Respiratory exam: PRESENT: clear to auscultation kaley, decreased breath sounds.  

ABSENT: rales, rhonchi, wheezes


Cardiovascular exam: PRESENT: RRR.  ABSENT: diastolic murmur, rubs, systolic 

murmur


Pulses: PRESENT: normal dorsalis pedis pul


Vascular exam: PRESENT: normal capillary refill


GI/Abdominal exam: PRESENT: normal bowel sounds, soft.  ABSENT: distended, 

guarding, mass, organolmegaly, rebound, tenderness


Rectal exam: PRESENT: deferred


Extremities exam: PRESENT: full ROM.  ABSENT: calf tenderness, clubbing, pedal 

edema


Musculoskeletal exam: PRESENT: full ROM - Time


Neurological exam: PRESENT: alert, awake, oriented to person, oriented to place

, oriented to situation, CN II-XII grossly intact, other - left leg 4/5 muscle 

strength


Psychiatric exam: PRESENT: flat affect


Skin exam: PRESENT: dry, intact, warm.  ABSENT: cyanosis, rash





Results


Laboratory Results: 


 





 06/19/17 01:24 





 06/19/17 05:47 





 











  06/19/17 06/19/17 06/19/17





  01:24 01:24 01:24


 


WBC  8.3  


 


RBC  4.05  


 


Hgb  9.8 L  


 


Hct  31.5 L  


 


MCV  78 L  


 


MCH  24.2 L  


 


MCHC  31.2 L  


 


RDW  17.3 H  


 


Plt Count  191  


 


VBG pH    7.26 L


 


VBG pCO2    60.6


 


VBG HCO3    26.3


 


VBG Base Excess    -1.5


 


Sodium   138.9 


 


Potassium   4.6 


 


Chloride   107 


 


Carbon Dioxide   23 


 


Anion Gap   9 


 


BUN   42 H 


 


Creatinine   1.63 H 


 


Est GFR ( Amer)   40 L 


 


Est GFR (Non-Af Amer)   33 L 


 


Glucose   151 H 


 


Calcium   9.2 














  06/19/17 06/19/17





  05:47 05:47


 


WBC  


 


RBC  


 


Hgb  


 


Hct  


 


MCV  


 


MCH  


 


MCHC  


 


RDW  


 


Plt Count  


 


VBG pH   7.28 L


 


VBG pCO2   54.4


 


VBG HCO3   24.9


 


VBG Base Excess   -2.5


 


Sodium  139.7 


 


Potassium  4.5 


 


Chloride  109 H 


 


Carbon Dioxide  20 L 


 


Anion Gap  11 


 


BUN  34 H 


 


Creatinine  1.14 


 


Est GFR ( Amer)  > 60 


 


Est GFR (Non-Af Amer)  49 L 


 


Glucose  126 H 


 


Calcium  9.0 








 











  06/19/17





  01:24


 


Troponin I  < 0.012











Impressions: 


 





Head CT  06/18/17 00:00


IMPRESSION:  No acute intracranial abnormality.


 








Chest X-Ray  06/18/17 16:39


IMPRESSION:  Small lung volumes with vascular crowding.  No focal infiltrates.


 








Hip X-Ray  06/18/17 16:40


IMPRESSION:  NEGATIVE STUDY OF THE LEFT HIP AND PELVIS. NO RADIOGRAPHIC 

EVIDENCE OF ACUTE INJURY.


 














Assessment & Plan





- Diagnosis


(1) Encephalopathy acute


Is this a current diagnosis for this admission?: YesPlan: 


Likely secondary to hypercapnic respiratory failure, less likely transischemic 

attack.  Her neurologic deficit and altered mental status have resolved.  She 

has not been using CPAP for the last 4 days.  She was found to have CO2 in the 

60s.  She was placed on BiPAP.  She will continue with BiPAP at night.  We will 

obtain MR I/MRA of her head and carotid duplex today.








(2) Hypercarbia


Is this a current diagnosis for this admission?: YesPlan: 


Patient with history of obstructive sleep apnea requiring CPAP at at bedtime.  

She has been noncompliant since discharge from rehab continue BiPAP at bedtime 

and as needed.  Consult discharge planning she would be best served in an 

assisted living environment.








(3) Acute renal failure


Qualifiers: 


     Acute renal failure type: unspecified     Qualified Code(s): N17.9 - Acute 

kidney failure, unspecified  


Is this a current diagnosis for this admission?: Yes





(4) Anemia


Qualifiers: 


     Anemia type: unspecified type        Qualified Code(s): D64.9 - Anemia, 

unspecified  


Is this a current diagnosis for this admission?: YesPlan: 


Continue to monitor, presently stable








(5) Diastolic CHF


Qualifiers: 


     Congestive heart failure chronicity: chronic        Qualified Code(s): 

I50.32 - Chronic diastolic (congestive) heart failure  


Is this a current diagnosis for this admission?: YesPlan: 


Appears euvolemic








(6) History of CVA (cerebrovascular accident)


Is this a current diagnosis for this admission?: YesPlan: 


Mild left sided weakness in the left leg. No new deficits








(7) Morbid obesity


Qualifiers: 


     Obesity type: due to excess calories        Qualified Code(s): E66.01 - 

Morbid (severe) obesity due to excess calories  


Is this a current diagnosis for this admission?: YesPlan: 


Patient counseled








(8) GOSIA (obstructive sleep apnea)


Is this a current diagnosis for this admission?: YesPlan: 


BIPAP at HS and prn








(9) Rheumatoid arthritis


Qualifiers: 


     Laterality: unspecified laterality


Is this a current diagnosis for this admission?: YesPlan: 


 Continue home medications











- Time


Time Spent with patient: 25-34 minutes


Critical Time spent with patient: 15-24 minutes


Medications reviewed and adjusted accordingly: Yes

## 2017-06-19 NOTE — RADIOLOGY REPORT (SQ)
EXAM DESCRIPTION:  MRI HEAD COMBO



COMPLETED DATE/TIME:  6/19/2017 7:42 pm



REASON FOR STUDY:  cva vs tia



COMPARISON:  None.



TECHNIQUE:  Multiplanar imaging includes noncontrasted T1, T2, FLAIR, diffusion with ADC map and post
gadolinium contrast T1 sequences. Images stored on PACS.



CONTRAST TYPE AND DOSE:  20 mL Multihance.



RENAL FUNCTION:  GFR > 60.



LIMITATIONS:  None.



FINDINGS:  ANATOMY: No anomalies. Normal vascular flow voids. Pituitary fossa normal.

CSF SPACES: Normal in size and contour. No hemorrhage.

CEREBRUM: Sulci and gyri normal in size and contour.  Age-appropriate white matter signal on FLAIR im
aging. No evidence of hemorrhage, mass, or extraaxial fluid collection. No abnormal enhancement post 
contrast.

POSTERIOR FOSSA: No signal alteration. No hemorrhage. No edema, masses, or mass effect. Internal concepcion
tory canals, cerebellopontine angles, mastoids normal. No enhancing lesions. No abnormal enhancement 
post contrast.

DIFFUSION IMAGING: Negative for acute or subacute infarction.

ORBITS: No masses. Globes normal.

PARANASAL SINUSES: No fluid levels. Mucosa normal.

OTHER: No other significant finding.



IMPRESSION:  Age-appropriate MRI OF THE BRAIN WITHOUT AND WITH INTRAVENOUS GADOLINIUM CONTRAST.



TECHNICAL DOCUMENTATION:  JOB ID:  [id number]

 [employer]- All Rights Reserved

## 2017-06-19 NOTE — RADIOLOGY REPORT (SQ)
EXAM DESCRIPTION:  MRA HEAD WITHOUT



COMPLETED DATE/TIME:  6/19/2017 7:42 pm



REASON FOR STUDY:  cva vs tia



COMPARISON:  None.



TECHNIQUE:  Axial 3-D time-of-flight acquisition imaging performed through the brain in the area of t
he [place].  Images reformatted using 3-D MIPS.



LIMITATIONS:  None.



FINDINGS:  SOURCE IMAGES: No unexpected findings on source images.  No large masses.

3-D MIP: No aneurysm.  No occlusions.  No significant stenosis.

OTHER: No other significant finding.



IMPRESSION:  NORMAL MRA OF THE [place].



TECHNICAL DOCUMENTATION:  JOB ID:  [id number]

 2011 [employer]- All Rights Reserved

## 2017-06-19 NOTE — RADIOLOGY REPORT (SQ)
EXAM DESCRIPTION:  CAROTID DOPPLER



COMPLETED DATE/TIME:  6/19/2017 2:15 pm



REASON FOR STUDY:  cva vs tia



COMPARISON:  None.



TECHNIQUE:  Grayscale ultrasound, Doppler velocity and spectra, and color Doppler images acquired of 
the extra-cranial carotid and vertebral arteries. Images stored on PACS.



LIMITATIONS:  Limited visualization due to the patient's obesity.



FINDINGS:  RIGHT CAROTID

CCA Velocities: Within normal limits.

ICA Velocities

 Peak systolic 1.56 m/s.

 End diastolic 0.29 m/s.

Proximal ICA/CCA peak systolic ratio 0.9.

Spectra normal. No significant plaque.

LEFT CAROTID

CCA Velocities: Within normal limits.

ICA Velocities

 Peak systolic 2.24 m/s.

 End diastolic 0.68 m/s.

Proximal ICA/CCA peak systolic ratio 1.2.

Spectra normal. No significant plaque.

VERTEBRAL ARTERIES: Antegrade flow.  Normal waveforms.

SUBCLAVIAN ARTERIES: No finding.

OTHER: No other significant finding.



IMPRESSION:  LIMITED STUDY.  VELOCITY MEASUREMENTS SUGGEST 50- 69% STENOSIS OF THE LEFT INTERNAL BRUSH
TID ARTERY BUT NO SIGNIFICANT PLAQUE DEMONSTRATED ON ULTRASOUND IMAGES.  THIS COULD BE DUE TO TORTUOS
ITY OF THE VESSEL.  NO HEMODYNAMICALLY SIGNIFICANT STENOSIS ON EITHER SIDE.



COMMENT:  Quality ID #195:  Velocity criteria are extrapolated from the diameter data as defined by t
he Society of Radiologists in Ultrasound Consensus Conference. Radiology 2003: 229; 340-346.



TECHNICAL DOCUMENTATION:  JOB ID:  [id number]

 2011 [employer]- All Rights Reserved

## 2017-06-19 NOTE — PDOC H&P
History of Present Illness


Admission Date/PCP: 


  17 22:12





[facility]





Rheum  Dr. Vinayak Rondon Pain t





Patient complains of: weakness


History of Present Illness: 


KALIE DANIEL is a 56 year old morbidly obese -American female with 

multiple underlying chronic problems, including fibromyalgia, methotrexate- and 

steroid-dependent rheumatoid arthritis, and chronic pain syndrome, along with 

chronic opiate use, who presents to the emergency room for evaluation of above 

complaint.





Patient has been discussed with emergency room physician who evaluated the 

patient.  Patient is fairly somnolent, and while she does awaken easily and 

answers occasional basic questions reasonably appropriately, she is able to 

provide no history whatsoever in terms of acute events, and little history 

related to chronic events, review of systems, personal habits, family history, 

etc. 2 daughters and a son-in-law are present and are somewhat helpful and 

informative, although they had not seen her since Tuesday of this week until 

the ..  Old inpatient records are reviewed. Above individuals present at 

patient's side with her approval.





Hospitalized on our service the  through the  of last month with final 

diagnoses including systemic inflammatory response syndrome secondary to viral 

gastroenteritis along with polypharmacy, among other diagnoses.  History and 

physical and discharge summary have been reviewed.





transferred from our facility to [facility] for rehab, and just was 

discharged from there back home this past Tuesday.





Granddaughter was helping her get out of a chair into the bathroom when patient 

fell on her left side approximately noon on the .  Complaining of bilateral 

pelvic pain with little strength in her legs, which according to her daughter, 

has been the case for the last 3 or 4 months.  Was noted to be hypotensive and 

poorly responsive upon arrival in the emergency room, with improvement in both 

with a single 0.2 mg of Narcan.  According to the emergency room physician, she 

takes 100 mg of morphine orally twice a day.





Was noted by the emergency room physician to have minimal function in her left 

lower extremity and decreased but improving function in her left upper 

extremity.  Prior stroke in  which affected the left side, but from which 

she had completely recovered, according to daughter.





no fever or chills, chest or abdominal pain.  However, patient does state that 

she has had some nausea vomiting and diarrhea off and on since arriving home.


 


 


Laboratory results are listed in [facility] and are reviewed. 


 


 


X-ray summary results are listed below, with full report(s) reviewed. .


 


 


 


EKG reviewed.


 


 


 


Social history/personal habits: Single.  Has children.  Disabled due to 

multiple health problems.  No use of alcohol tobacco or illicit drugs.


 


 


 


Allergies/adverse reactions are listed in [employer] and are reviewed. 


 


 


Home medications initially autopopulated into [employer] may not accurately 

reflect patient's true medications, dosages, and/or frequencies. Pharmacy tech 

to reconcile  medications.


 


Unfortunately, patient cannot provide any information related to medications/

dosages/frequencies.  


 





REVIEW OF SYSTEMS: See history and present illness.  No further information 

available this point in time.


 





 


 


PHYSICAL EXAMINATION:


 


5 feet 6 inches tall.  149 kg.  BMI 53 kg/m.  Blood pressure 158/86.  Pulse 97 

and regular.  100% saturation on room air.  Respirations are 12 and unlabored.  

Temperature 97.7.





Morbidly obese -American female appearing approximately her stated age.  

Somnolent, but does awaken somewhat when spoken to in a normal volume voice.  

Does provide occasionally reasonable answers to basic questions concerning 

chronic aspects of her health, but is not able to provide any information 

related to acute events that led her to come to the emergency room.  

Maintaining her airway well.





Female emergency room nursing technician Rosa is present.





2 daughters and a son-in-law are present.  Patient approves.


 


Skin is warm and dry.  No grossly obvious evidence of rash in areas of skin 

examined.  No subcutaneous nodules palpated.


 


ENT: Hearing grossly normal to normal conversation.  Tongue midline on 

protrusion pink and slightly tacky.


 


Eyes: No scleral icterus.  Pupils equal and reactive to light at 4 mm.  Pink 

conjunctivae.  No raccoon eyes.


 


Neck is supple and nontender to gentle active range of motion and palpation.  

Midline trachea.  No palpable thyroid nodule mass enlargement or tenderness.


 


Lymphatic: No palpable cervical or clavicular nodes.


 


Neck and lymphatic exams limited by patient body habitus.


 


Psychiatric: Difficult to adequately evaluate due to her current status.  See 

above.


 


Lungs: Auscultation reveals clear and equal breath sounds bilaterally.  No use 

of accessory respiratory muscles.


 


Cardiovascular: Heart regular rate and rhythm, without gallop murmur or rub.  

No carotid or abdominal aortic bruits. No ankle or pedal edema. Faintly 

palpable dorsalis pedis pulses.


 


Abdomen:soft prominently obese nontender with positive bowel sounds.  Unable to 

adequately evaluate abdomen for masses or organomegaly due to body habitus. 


 


Extremities: Feet are warm and dry.  No calf tenderness to compression.  No 

grossly obvious visual evidence of calf swelling.  Gentle manipulation of lower 

extremities fails to reveal any obvious evidence of injury or instability to 

knees hips or ankles although range of motion is quite limited due to 

combination of her weakness and body habitus.


 


Neurologic: Cranial Nerves somewhat difficult to evaluate due to patient's 

somnolence and perhaps some difficulty in understanding instructions.  Mild 

left facial droop.  Tongue midline on protrusion.  No nystagmus.  Does not 

perform trapezius raise.  Does not attempt extraocular movements.  Light touch 

cannot be adequately evaluated due to her mental status.  Motor function of 

major muscle groups right upper extremity 5/5; 4/5 right lower extremity.  

Handgrip on the left 4/5 versus 5/5 on the right.  Biceps and triceps function 

on the left 4/5, versus 5/5 on the right.  Left lower extremity motor function 3

/5.  Patellar reflexes absent.  Absent Babinski.  No ankle clonus.


 











Past Medical History


Cardiac Medical History: Reports: Hyperlipidema, Hypertension, Heart Murmur -  systolic murmur


   Denies: Atrial Fibrillation, Congestive Heart Failure, Coronary Artery 

Disease, Myocardial Infarction, Peripheral Vascular Disease, Pulmonary Embolism


Pulmonary Medical History: Reports: Asthma, Bronchitis, Pneumonia, Sleep Apnea


   Denies: Chronic Obstructive Pulmonary Disease (COPD), Respiratory Failure, 

Tuberculosis


Neurological Medical History: 


   Denies: Seizures


Endocrine Medical History: Reports: Diabetes Mellitus Type 2


   Denies: Hyperthyroidism, Hypothyroidism


Malignancy Medical History: 


   Denies: Leukemia, Lung Cancer


GI Medical History: Reports: Crohn's Disease, Hiatal Hernia


   Denies: Gastroesophageal Reflux Disease


Musculoskeltal Medical History: Reports: Arthritis, Fibromyalgia, Other - 

Sjogren's syndrome, Lupus


Psychiatric Medical History: Reports: Depression, Personality Disorder


   Denies: Bipolar Disorder, Dementia, Post Traumatic Stress Disorder


Hematology: Reports: Anemia


   Denies: Hemophilia, Sickle Cell Disease


Infectious Medical History: 


   Denies: HIV





Past Surgical History


Past Surgical History: Reports:  Section, Hysterectomy


   Denies: Amputation, Appendectomy, Cholecystectomy, Colostomy, Coronary 

Artery Bypass Graft, Gastric Bypass Surgery, Herniorrhaphy, Mastectomy, 

Pacemaker, Tonsillectomy, Tubal Ligation





Social History


Information Source: Patient, Relative - Family, Emergency Med Personnel, [facility] 

Records


Lives with: Family


Smoking Status: Never Smoker


Frequency of Alcohol Use: None


Hx Recreational Drug Use: No


Drugs: None


Hx Prescription Drug Abuse: Yes - 2016





- Advance Directive


Resuscitation Status: Full Code


Surrogate healthcare decision maker:: 


Mina Daniel





Family History


Family History: CAD, DM, Hypertension


Parental Family History Reviewed: Yes - Mother  of old age; father of 

Alzheimer's


Children Family History Reviewed: Yes - Hypertension


Sibling(s) Family History Reviewed.: Yes - Healthy





Medication/Allergy


Home Medications: 








Albuterol Sulfate [Ventolin HFA MDI 18 GM] 2 puff IH Q6HP PRN 17 


Amlodipine Besylate [Norvasc 10 mg Tablet] 10 mg PO DAILY 17 


Atorvastatin Calcium [Lipitor 40 mg Tablet] 40 mg PO QHS 17 


Celecoxib [Celebrex 200 mg Capsule] 200 mg PO Q12 17 


Cetirizine HCl [Zyrtec 10 mg Tablet] 10 mg PO DAILY 17 


Cevimeline HCl [Evoxac] 30 mg PO TIDP PRN 17 


Clopidogrel Bisulfate [Plavix 75 mg Tablet] 75 mg PO DAILY 17 


Docusate Sodium [Dok] 100 mg PO BID 17 


Duloxetine HCl [Cymbalta] 60 mg PO Q12 17 


Ergocalciferol (Vitamin D2) [Drisdol 50,000 Unit (1.25MG) Capsule] 50,000 unit 

PO [id number] 17 


Famotidine [Pepcid 40 mg Tablet] 40 mg PO QHS 17 


Folic Acid [Folvite 1 mg Tablet] 1 mg PO DAILY 17 


Furosemide [Lasix 80 mg Tablet] 80 mg PO DAILY 17 


Ketorolac Tromethamine [Toradol 10 mg Tablet] 10 mg PO Q6HP PRN 17 


Lisinopril [Prinivil 5 mg Tablet] 5 mg PO DAILY 17 


Metformin HCl [Glucophage] 1,000 mg PO BIDACBS 17 


Methotrexate Sodium [Methotrexate] 15 mg PO TH@1000 17 


Metoprolol Succinate [Toprol  mg Tablet] 100 mg PO Q12 17 


Montelukast Sodium [Singulair 10 mg Tablet] 10 mg PO QHS 17 


Mupirocin [Bactroban 2% Ointment 22 gm] 1 applic NASL ASDIR PRN 17 


Potassium Chloride [K-Tab ER] 20 meq PO DAILY 17 


Pregabalin [Lyrica] 150 mg PO Q12 17 


Prenatal Vit/Iron Fumarate/FA [Prenatal Tablet] 1 tab PO DAILY 17 


Spironolactone [Aldactone 25 mg Tablet] 25 mg PO DAILY 17 


Sulfasalazine [Sulfazine] 500 mg PO Q8 17 


Tizanidine HCl [Zanaflex 4 Mg Tablet] 4 mg PO Q8HP PRN MDD 3 CAPSULES 17 


Verapamil HCl [Calan 120 mg Tablet] 120 mg PO DAILY 17 


Zolpidem Tartrate [Ambien] 10 mg PO HSP PRN 17 








Allergies/Adverse Reactions: 


 





ibuprofen [Ibuprofen] Allergy (Severe, Verified 17 00:36)


 Chest pain











Physical Exam


Vital Signs: 


 











Temp Pulse Resp BP Pulse Ox


 


 97.7 F   99   17   145/102 H  96 


 


 17 00:32  17 00:32  17 00:32  17 00:32  17 00:32








 Intake & Output











 17





 00:59 00:59 00:59


 


Weight   149 kg














Results


Laboratory Results: 


 











  17





  22:27


 


VBG pH  7.24 L


 


VBG pCO2  60.4


 


VBG HCO3  25.2


 


VBG Base Excess  -2.6











Impressions: 


 





Head CT  17 00:00


IMPRESSION:  No acute intracranial abnormality.


 








Chest X-Ray  17 16:39


IMPRESSION:  Small lung volumes with vascular crowding.  No focal infiltrates.


 








Hip X-Ray  17 16:40


IMPRESSION:  NEGATIVE STUDY OF THE LEFT HIP AND PELVIS. NO RADIOGRAPHIC 

EVIDENCE OF ACUTE INJURY.


 














Assessment & Plan





- Diagnosis


(1) Acute focal neurological deficit


Is this a current diagnosis for this admission?: YesPlan: 


Patient will be  admitted under CVA/TIA protocol.  Multiple imaging procedures, 

intracranial, vascular, and cardiac.


 


Permissive hypertension.


 


Patient is a full code.


 


I have strongly urged patient not to get out of bed, to avoid a fall with 

injury.


 


Knee high SCDs for DVT prophylaxis, along with subcutaneous heparin.


 


Impression and plans were discussed with patient, and family, all of whom 

concur.


 


Time spent in evaluation and management of patient: 80 minutes








(2) Acute renal failure


Qualifiers: 


     Acute renal failure type: unspecified     Qualified Code(s): N17.9 - Acute 

kidney failure, unspecified  


Is this a current diagnosis for this admission?: YesPlan: 


Likely prerenal due to nausea vomiting and diarrhea, along with possibly 

decreased oral intake.  IV fluids.  Serial chemistry.








(3) Anemia


Qualifiers: 


     Anemia type: unspecified type        Qualified Code(s): D64.9 - Anemia, 

unspecified  


Is this a current diagnosis for this admission?: YesPlan: 


Follow-up CBC with differential.  No need for transfusion at present time.








(4) Elevated LFTs


Is this a current diagnosis for this admission?: Yes





(5) Facial droop


Is this a current diagnosis for this admission?: Yes





(6) Hypercarbia


Is this a current diagnosis for this admission?: YesPlan: 


Normally uses CPAP at night, room air.  Placed on BiPAP tonight.  Follow-up 

venous gas.








(7) Hypotension


Qualifiers: 


     Hypotension type: unspecified hypotension type        Qualified Code(s): 

I95.9 - Hypotension, unspecified  


Is this a current diagnosis for this admission?: YesPlan: 


Likely due to combination of dehydration from her nausea vomiting and diarrhea, 

along with her morphine.  Resolved nicely with a combination of Narcan and IV 

fluid.








(8) Left hemiparesis


Is this a current diagnosis for this admission?: Yes





(9) Nausea vomiting and diarrhea


Is this a current diagnosis for this admission?: YesPlan: 


Stool for C. difficile.








(10) Chronic pain


Qualifiers: 


     Chronic pain type: chronic pain syndrome        Qualified Code(s): G89.4 - 

Chronic pain syndrome  


Is this a current diagnosis for this admission?: Yes





(11) Diastolic CHF


Qualifiers: 


     Congestive heart failure chronicity: chronic        Qualified Code(s): 

I50.32 - Chronic diastolic (congestive) heart failure  


Is this a current diagnosis for this admission?: YesPlan: 





No evidence of acute exacerbation of same.  Resume home medications as 

appropriate once these have been determined and reviewed.








(12) GOSIA (obstructive sleep apnea)


Is this a current diagnosis for this admission?: Yes





(13) Steroid dependence


Is this a current diagnosis for this admission?: Yes








- Inpatient Certification


Based on my medical assessment, after consideration of the patient's 

comorbidities, presenting symptoms, or acuity I expect that the services needed 

warrant INPATIENT care.: Yes


I certify that my determination is in accordance with my understanding of 

Medicare's requirements for reasonable and necessary INPATIENT services [42 CFR 

412.3e].: Yes


Medical Necessity: Significant Comorbidiites Make Outpatient Treatment Too Risky

, Need For IV Fluids, Need For Continuous Telemetry Monitoring, Risk of 

Diagnosis Which Will Require Inpatient Eval/Care/Monitoring


Post Hospital Care: D/C or Transfer Summary

## 2017-06-20 RX ADMIN — HEPARIN SODIUM SCH UNIT: 5000 INJECTION, SOLUTION INTRAVENOUS; SUBCUTANEOUS at 14:34

## 2017-06-20 RX ADMIN — SULFASALAZINE SCH MG: 500 TABLET, DELAYED RELEASE ORAL at 15:23

## 2017-06-20 RX ADMIN — Medication SCH ML: at 21:38

## 2017-06-20 RX ADMIN — FAMOTIDINE SCH MG: 20 TABLET, FILM COATED ORAL at 21:38

## 2017-06-20 RX ADMIN — Medication SCH ML: at 14:34

## 2017-06-20 RX ADMIN — FUROSEMIDE SCH MG: 80 TABLET ORAL at 09:56

## 2017-06-20 RX ADMIN — INSULIN LISPRO PRN UNIT: 100 INJECTION, SOLUTION INTRAVENOUS; SUBCUTANEOUS at 16:57

## 2017-06-20 RX ADMIN — DOCUSATE SODIUM SCH MG: 100 CAPSULE, LIQUID FILLED ORAL at 09:56

## 2017-06-20 RX ADMIN — LISINOPRIL SCH MG: 5 TABLET ORAL at 09:56

## 2017-06-20 RX ADMIN — ATORVASTATIN CALCIUM SCH MG: 40 TABLET, FILM COATED ORAL at 21:38

## 2017-06-20 RX ADMIN — VERAPAMIL HYDROCHLORIDE SCH MG: 120 TABLET, FILM COATED ORAL at 11:02

## 2017-06-20 RX ADMIN — INSULIN LISPRO PRN UNIT: 100 INJECTION, SOLUTION INTRAVENOUS; SUBCUTANEOUS at 12:08

## 2017-06-20 RX ADMIN — CELECOXIB SCH MG: 200 CAPSULE ORAL at 21:38

## 2017-06-20 RX ADMIN — HEPARIN SODIUM SCH UNIT: 5000 INJECTION, SOLUTION INTRAVENOUS; SUBCUTANEOUS at 21:37

## 2017-06-20 RX ADMIN — FOLIC ACID SCH MG: 1 TABLET ORAL at 09:55

## 2017-06-20 RX ADMIN — Medication SCH ML: at 05:17

## 2017-06-20 RX ADMIN — CELECOXIB SCH MG: 200 CAPSULE ORAL at 09:55

## 2017-06-20 RX ADMIN — MONTELUKAST SODIUM SCH MG: 10 TABLET, FILM COATED ORAL at 21:38

## 2017-06-20 RX ADMIN — SPIRONOLACTONE SCH MG: 25 TABLET, FILM COATED ORAL at 09:56

## 2017-06-20 RX ADMIN — HEPARIN SODIUM SCH UNIT: 5000 INJECTION, SOLUTION INTRAVENOUS; SUBCUTANEOUS at 05:13

## 2017-06-20 RX ADMIN — METOPROLOL SUCCINATE SCH MG: 50 TABLET, EXTENDED RELEASE ORAL at 21:38

## 2017-06-20 RX ADMIN — SULFASALAZINE SCH MG: 500 TABLET, DELAYED RELEASE ORAL at 21:38

## 2017-06-20 RX ADMIN — CLOPIDOGREL BISULFATE SCH MG: 75 TABLET, FILM COATED ORAL at 09:01

## 2017-06-20 RX ADMIN — DOCUSATE SODIUM SCH MG: 100 CAPSULE, LIQUID FILLED ORAL at 17:42

## 2017-06-20 RX ADMIN — PREGABALIN SCH MG: 75 CAPSULE ORAL at 21:38

## 2017-06-20 RX ADMIN — CETIRIZINE HYDROCHLORIDE SCH MG: 10 TABLET, FILM COATED ORAL at 09:56

## 2017-06-20 RX ADMIN — ASPIRIN 325 MG ORAL TABLET SCH MG: 325 PILL ORAL at 09:01

## 2017-06-20 RX ADMIN — DULOXETINE SCH MG: 30 CAPSULE, DELAYED RELEASE ORAL at 21:38

## 2017-06-20 RX ADMIN — METFORMIN HYDROCHLORIDE SCH MG: 500 TABLET, FILM COATED ORAL at 15:23

## 2017-06-20 NOTE — PDOC PROGRESS REPORT
Subjective


Progress Note for:: 06/20/17


Subjective:: 


Patient seen on morning rounds.  She is resting in bed at the present time.  

She awakens easily to verbal stimuli.  She is able to move all extremities 4, 

she has mild weakness in the left lower extremity chronically.  She has no 

neurological deficits at the present time.  She staates she wants to get out of 

bed  She apparently went home from rehab on Tuesday of this week.  She 

apparently did not take her CPAP on with her.  She first said she thinks it was 

stolen.  She has been receiving home health otherwise she lives alone.  She 

denies any complaints at present time.  She denies any shortness of breath, 

dyspnea or cough.  She denies any chest pain, headache or dizziness.  She 

denies any nausea, vomiting or diarrhea.  Rest of the review of systems are 

negative.





Physical Exam


Vital Signs: 


 











Temp Pulse Resp BP Pulse Ox


 


 99.1 F   120 H  20   157/85 H  99 


 


 06/20/17 12:00  06/20/17 12:00  06/20/17 12:00  06/20/17 12:00  06/20/17 12:00








 Intake & Output











 06/19/17 06/20/17 06/21/17





 06:59 06:59 06:59


 


Intake Total 535 4730 355


 


Output Total 1200 4000 400


 


Balance -665 730 -45


 


Weight 148.6 kg 147.5 kg 











General appearance: PRESENT: no acute distress, morbidly obese, well-developed, 

well-nourished


Head exam: PRESENT: atraumatic, normocephalic


Eye exam: PRESENT: conjunctiva pink, EOMI, PERRLA.  ABSENT: scleral icterus


Ear exam: PRESENT: normal external ear exam


Mouth exam: PRESENT: moist, tongue midline


Neck exam: ABSENT: carotid bruit, JVD, lymphadenopathy, thyromegaly


Respiratory exam: PRESENT: decreased breath sounds, symmetrical, unlabored.  

ABSENT: rales, rhonchi, wheezes


Cardiovascular exam: PRESENT: RRR, +S1, +S2


Pulses: PRESENT: normal carotid pulses, normal radial pulses


Vascular exam: PRESENT: normal capillary refill


GI/Abdominal exam: PRESENT: normal bowel sounds, soft.  ABSENT: distended, 

guarding, mass, organolmegaly, rebound, tenderness


Rectal exam: PRESENT: deferred


Extremities exam: PRESENT: full ROM, joint swelling, +1 edema - left lower leg.

  ABSENT: calf tenderness, clubbing, pedal edema


Neurological exam: PRESENT: alert, awake, oriented to person, oriented to place

, oriented to time, oriented to situation, CN II-XII grossly intact.  ABSENT: 

motor sensory deficit


Psychiatric exam: PRESENT: appropriate affect, normal mood.  ABSENT: homicidal 

ideation, suicidal ideation


Skin exam: PRESENT: dry, intact, warm.  ABSENT: cyanosis, rash





Results


Laboratory Results: 


 





 06/19/17 01:24 





 06/19/17 05:47 





 











  06/19/17





  01:24


 


Troponin I  < 0.012











Impressions: 


 





Head CT  06/18/17 00:00


IMPRESSION:  No acute intracranial abnormality.


 








Chest X-Ray  06/18/17 16:39


IMPRESSION:  Small lung volumes with vascular crowding.  No focal infiltrates.


 








Hip X-Ray  06/18/17 16:40


IMPRESSION:  NEGATIVE STUDY OF THE LEFT HIP AND PELVIS. NO RADIOGRAPHIC 

EVIDENCE OF ACUTE INJURY.


 








Head MRI  06/19/17 00:32


IMPRESSION:  Age-appropriate MRI OF THE BRAIN WITHOUT AND WITH INTRAVENOUS 

GADOLINIUM CONTRAST.


 








Brain MRI with MRA  06/19/17 00:33


IMPRESSION:  NORMAL MRA OF THE [place] OF SENIOR.


 








Carotid Doppler Study  06/19/17 00:33


IMPRESSION:  LIMITED STUDY.  VELOCITY MEASUREMENTS SUGGEST 50- 69% STENOSIS OF 

THE LEFT INTERNAL CAROTID ARTERY BUT NO SIGNIFICANT PLAQUE DEMONSTRATED ON 

ULTRASOUND IMAGES.  THIS COULD BE DUE TO TORTUOSITY OF THE VESSEL.  NO 

HEMODYNAMICALLY SIGNIFICANT STENOSIS ON EITHER SIDE.


 














Assessment & Plan





- Diagnosis


(1) Encephalopathy acute


Is this a current diagnosis for this admission?: YesPlan: 





Likely secondary to hypercapnic respiratory failure. MRI showed no infarcts or 

changes  Her neurologic deficit and altered mental status have resolved.  She 

has not been using CPAP for the last 4 days.  She was found to have CO2 in the 

60s.  She was placed on BiPAP.  She will continue with BiPAP at night. Carotid 

duplex showed moderate stenosis on right will need vascular surgery follow up








(2) Hypercarbia


Is this a current diagnosis for this admission?: YesPlan: 


Patient with history of obstructive sleep apnea requiring CPAP at at bedtime.  

She has been noncompliant since discharge from rehab continue BiPAP at bedtime 

and as needed.  Consult discharge planning she would be best served in an 

assisted living environment.








(3) Acute renal failure


Qualifiers: 


     Acute renal failure type: unspecified     Qualified Code(s): N17.9 - Acute 

kidney failure, unspecified  


Is this a current diagnosis for this admission?: YesPlan: 


Resolved secondary to prerenal volume depletion








(4) Anemia


Qualifiers: 


     Anemia type: unspecified type        Qualified Code(s): D64.9 - Anemia, 

unspecified  


Is this a current diagnosis for this admission?: YesPlan: 


Continue to monitor, presently stable








(5) Diastolic CHF


Qualifiers: 


     Congestive heart failure chronicity: chronic        Qualified Code(s): 

I50.32 - Chronic diastolic (congestive) heart failure  


Is this a current diagnosis for this admission?: Yes





(6) History of CVA (cerebrovascular accident)


Is this a current diagnosis for this admission?: YesPlan: 


Mild left sided weakness in the left leg. No new deficits








(7) GOSIA (obstructive sleep apnea)


Is this a current diagnosis for this admission?: YesPlan: 


BIPAP at HS and prn








(8) Rheumatoid arthritis


Qualifiers: 


     Laterality: unspecified laterality


Is this a current diagnosis for this admission?: YesPlan: 


 Continue home medications








(9) Morbid obesity


Qualifiers: 


     Obesity type: due to excess calories        Qualified Code(s): E66.01 - 

Morbid (severe) obesity due to excess calories  


Is this a current diagnosis for this admission?: YesPlan: 


Patient counseled











- Time


Time Spent with patient: 25-34 minutes


Critical Time spent with patient: 15-24 minutes


Medications reviewed and adjusted accordingly: Yes


Anticipated discharge: SNF

## 2017-06-21 LAB
ANION GAP SERPL CALC-SCNC: 9 MMOL/L (ref 5–19)
BASOPHILS # BLD AUTO: 0.1 10^3/UL (ref 0–0.2)
BASOPHILS NFR BLD AUTO: 0.9 % (ref 0–2)
BUN SERPL-MCNC: 9 MG/DL (ref 7–20)
CALCIUM: 9.1 MG/DL (ref 8.4–10.2)
CHLORIDE SERPL-SCNC: 110 MMOL/L (ref 98–107)
CO2 SERPL-SCNC: 25 MMOL/L (ref 22–30)
CREAT SERPL-MCNC: 0.77 MG/DL (ref 0.52–1.25)
EOSINOPHIL # BLD AUTO: 0.2 10^3/UL (ref 0–0.6)
EOSINOPHIL NFR BLD AUTO: 2.7 % (ref 0–6)
ERYTHROCYTE [DISTWIDTH] IN BLOOD BY AUTOMATED COUNT: 17.8 % (ref 11.5–14)
GLUCOSE SERPL-MCNC: 111 MG/DL (ref 75–110)
HCT VFR BLD CALC: 28.9 % (ref 36–47)
HGB BLD-MCNC: 9.3 G/DL (ref 12–15.5)
HGB HCT DIFFERENCE: -1
LYMPHOCYTES # BLD AUTO: 2.3 10^3/UL (ref 0.5–4.7)
LYMPHOCYTES NFR BLD AUTO: 27.8 % (ref 13–45)
MCH RBC QN AUTO: 24.3 PG (ref 27–33.4)
MCHC RBC AUTO-ENTMCNC: 32.2 G/DL (ref 32–36)
MCV RBC AUTO: 76 FL (ref 80–97)
MONOCYTES # BLD AUTO: 0.9 10^3/UL (ref 0.1–1.4)
MONOCYTES NFR BLD AUTO: 10.8 % (ref 3–13)
NEUTROPHILS # BLD AUTO: 4.7 10^3/UL (ref 1.7–8.2)
NEUTS SEG NFR BLD AUTO: 57.8 % (ref 42–78)
POTASSIUM SERPL-SCNC: 4 MMOL/L (ref 3.6–5)
RBC # BLD AUTO: 3.82 10^6/UL (ref 3.72–5.28)
SODIUM SERPL-SCNC: 144.4 MMOL/L (ref 137–145)
WBC # BLD AUTO: 8.1 10^3/UL (ref 4–10.5)

## 2017-06-21 RX ADMIN — FUROSEMIDE SCH MG: 80 TABLET ORAL at 09:13

## 2017-06-21 RX ADMIN — DULOXETINE SCH MG: 30 CAPSULE, DELAYED RELEASE ORAL at 22:09

## 2017-06-21 RX ADMIN — Medication SCH ML: at 22:11

## 2017-06-21 RX ADMIN — DOCUSATE SODIUM SCH MG: 100 CAPSULE, LIQUID FILLED ORAL at 17:09

## 2017-06-21 RX ADMIN — FAMOTIDINE SCH MG: 20 TABLET, FILM COATED ORAL at 22:10

## 2017-06-21 RX ADMIN — ATORVASTATIN CALCIUM SCH MG: 40 TABLET, FILM COATED ORAL at 22:10

## 2017-06-21 RX ADMIN — LISINOPRIL SCH MG: 5 TABLET ORAL at 09:16

## 2017-06-21 RX ADMIN — VERAPAMIL HYDROCHLORIDE SCH MG: 120 TABLET, FILM COATED ORAL at 09:19

## 2017-06-21 RX ADMIN — HEPARIN SODIUM SCH UNIT: 5000 INJECTION, SOLUTION INTRAVENOUS; SUBCUTANEOUS at 22:10

## 2017-06-21 RX ADMIN — CELECOXIB SCH MG: 200 CAPSULE ORAL at 22:11

## 2017-06-21 RX ADMIN — Medication SCH ML: at 05:14

## 2017-06-21 RX ADMIN — ACETAMINOPHEN PRN MG: 325 TABLET ORAL at 22:10

## 2017-06-21 RX ADMIN — HEPARIN SODIUM SCH UNIT: 5000 INJECTION, SOLUTION INTRAVENOUS; SUBCUTANEOUS at 13:46

## 2017-06-21 RX ADMIN — CLOPIDOGREL BISULFATE SCH MG: 75 TABLET, FILM COATED ORAL at 09:14

## 2017-06-21 RX ADMIN — PREGABALIN SCH MG: 75 CAPSULE ORAL at 22:09

## 2017-06-21 RX ADMIN — Medication SCH ML: at 13:47

## 2017-06-21 RX ADMIN — SULFASALAZINE SCH MG: 500 TABLET, DELAYED RELEASE ORAL at 22:13

## 2017-06-21 RX ADMIN — CETIRIZINE HYDROCHLORIDE SCH MG: 10 TABLET, FILM COATED ORAL at 09:16

## 2017-06-21 RX ADMIN — METFORMIN HYDROCHLORIDE SCH MG: 500 TABLET, FILM COATED ORAL at 17:09

## 2017-06-21 RX ADMIN — METFORMIN HYDROCHLORIDE SCH MG: 500 TABLET, FILM COATED ORAL at 09:18

## 2017-06-21 RX ADMIN — ASPIRIN 325 MG ORAL TABLET SCH MG: 325 PILL ORAL at 09:17

## 2017-06-21 RX ADMIN — METOPROLOL SUCCINATE SCH MG: 50 TABLET, EXTENDED RELEASE ORAL at 09:18

## 2017-06-21 RX ADMIN — SULFASALAZINE SCH MG: 500 TABLET, DELAYED RELEASE ORAL at 13:46

## 2017-06-21 RX ADMIN — PREGABALIN SCH MG: 75 CAPSULE ORAL at 09:17

## 2017-06-21 RX ADMIN — POTASSIUM CHLORIDE SCH MEQ: 750 TABLET, FILM COATED, EXTENDED RELEASE ORAL at 09:14

## 2017-06-21 RX ADMIN — DULOXETINE SCH MG: 30 CAPSULE, DELAYED RELEASE ORAL at 09:16

## 2017-06-21 RX ADMIN — FOLIC ACID SCH MG: 1 TABLET ORAL at 09:16

## 2017-06-21 RX ADMIN — DOCUSATE SODIUM SCH MG: 100 CAPSULE, LIQUID FILLED ORAL at 09:15

## 2017-06-21 RX ADMIN — METOPROLOL SUCCINATE SCH MG: 50 TABLET, EXTENDED RELEASE ORAL at 22:08

## 2017-06-21 RX ADMIN — CELECOXIB SCH MG: 200 CAPSULE ORAL at 09:15

## 2017-06-21 RX ADMIN — MONTELUKAST SODIUM SCH MG: 10 TABLET, FILM COATED ORAL at 22:09

## 2017-06-21 RX ADMIN — SPIRONOLACTONE SCH MG: 25 TABLET, FILM COATED ORAL at 09:15

## 2017-06-21 RX ADMIN — SULFASALAZINE SCH MG: 500 TABLET, DELAYED RELEASE ORAL at 05:13

## 2017-06-21 RX ADMIN — HEPARIN SODIUM SCH UNIT: 5000 INJECTION, SOLUTION INTRAVENOUS; SUBCUTANEOUS at 05:15

## 2017-06-21 NOTE — PDOC PROGRESS REPORT
Subjective


Progress Note for:: 06/21/17


Subjective:: 


Patient seen on morning rounds.  She is resting in bed at the present time.  

She awakens easily to verbal stimuli.  She is able to move all extremities 4, 

she has mild weakness in the left lower extremity chronically.  She has no 

neurological deficits at the present time.  She states she wants to get out of 

bed    She denies any complaints at present time.  She denies any shortness of 

breath, dyspnea or cough.  She denies any chest pain, headache or dizziness.  

She denies any nausea, vomiting or diarrhea.  Rest of the review of systems are 

negative.





Physical Exam


Vital Signs: 


 











Temp Pulse Resp BP Pulse Ox


 


 98.8 F   95   16   139/82 H  95 


 


 06/21/17 12:17  06/21/17 14:00  06/21/17 12:17  06/21/17 12:17  06/21/17 12:17








 Intake & Output











 06/20/17 06/21/17 06/22/17





 06:59 06:59 06:59


 


Intake Total 4730 2025 


 


Output Total 4000 3500 


 


Balance 730 -1475 


 


Weight 147.5 kg 142.7 kg 











General appearance: PRESENT: no acute distress, morbidly obese, well-developed, 

well-nourished


Head exam: PRESENT: atraumatic, normocephalic


Eye exam: PRESENT: conjunctiva pink, EOMI, PERRLA.  ABSENT: scleral icterus


Ear exam: PRESENT: normal external ear exam


Mouth exam: PRESENT: moist, tongue midline


Neck exam: ABSENT: carotid bruit, JVD, lymphadenopathy, thyromegaly


Respiratory exam: PRESENT: clear to auscultation kaley.  ABSENT: rales, rhonchi, 

wheezes


Cardiovascular exam: PRESENT: RRR, +S1, +S2.  ABSENT: diastolic murmur, rubs, 

systolic murmur


Pulses: PRESENT: normal carotid pulses, normal radial pulses


Vascular exam: PRESENT: normal capillary refill


GI/Abdominal exam: PRESENT: normal bowel sounds, soft.  ABSENT: distended, 

guarding, mass, organolmegaly, rebound, tenderness


Rectal exam: PRESENT: deferred


Extremities exam: PRESENT: full ROM.  ABSENT: calf tenderness, clubbing, pedal 

edema


Neurological exam: PRESENT: alert, awake, oriented to person, oriented to place

, oriented to time, oriented to situation, CN II-XII grossly intact.  ABSENT: 

motor sensory deficit


Psychiatric exam: PRESENT: appropriate affect, normal mood.  ABSENT: homicidal 

ideation, suicidal ideation


Skin exam: PRESENT: dry, intact, warm.  ABSENT: cyanosis, rash





Results


Laboratory Results: 


 





 06/21/17 04:51 





 06/21/17 04:51 





 











  06/21/17 06/21/17





  04:51 04:51


 


WBC  8.1 


 


RBC  3.82 


 


Hgb  9.3 L 


 


Hct  28.9 L 


 


MCV  76 L 


 


MCH  24.3 L 


 


MCHC  32.2 


 


RDW  17.8 H 


 


Plt Count  181 


 


Seg Neutrophils %  57.8 


 


Lymphocytes %  27.8 


 


Monocytes %  10.8 


 


Eosinophils %  2.7 


 


Basophils %  0.9 


 


Absolute Neutrophils  4.7 


 


Absolute Lymphocytes  2.3 


 


Absolute Monocytes  0.9 


 


Absolute Eosinophils  0.2 


 


Absolute Basophils  0.1 


 


Sodium   144.4


 


Potassium   4.0


 


Chloride   110 H


 


Carbon Dioxide   25


 


Anion Gap   9


 


BUN   9


 


Creatinine   0.77


 


Est GFR ( Amer)   > 60


 


Est GFR (Non-Af Amer)   > 60


 


Glucose   111 H


 


Calcium   9.1








 











  06/19/17





  01:24


 


Troponin I  < 0.012











Impressions: 


 





Head CT  06/18/17 00:00


IMPRESSION:  No acute intracranial abnormality.


 








Chest X-Ray  06/18/17 16:39


IMPRESSION:  Small lung volumes with vascular crowding.  No focal infiltrates.


 








Hip X-Ray  06/18/17 16:40


IMPRESSION:  NEGATIVE STUDY OF THE LEFT HIP AND PELVIS. NO RADIOGRAPHIC 

EVIDENCE OF ACUTE INJURY.


 








Head MRI  06/19/17 00:32


IMPRESSION:  Age-appropriate MRI OF THE BRAIN WITHOUT AND WITH INTRAVENOUS 

GADOLINIUM CONTRAST.


 








Brain MRI with MRA  06/19/17 00:33


IMPRESSION:  NORMAL MRA OF THE [place] OF SENIOR.


 








Carotid Doppler Study  06/19/17 00:33


IMPRESSION:  LIMITED STUDY.  VELOCITY MEASUREMENTS SUGGEST 50- 69% STENOSIS OF 

THE LEFT INTERNAL CAROTID ARTERY BUT NO SIGNIFICANT PLAQUE DEMONSTRATED ON 

ULTRASOUND IMAGES.  THIS COULD BE DUE TO TORTUOSITY OF THE VESSEL.  NO 

HEMODYNAMICALLY SIGNIFICANT STENOSIS ON EITHER SIDE.


 














Assessment & Plan





- Diagnosis


(1) Encephalopathy acute


Is this a current diagnosis for this admission?: YesPlan: 





Likely secondary to hypercapnic respiratory failure. MRI showed no infarcts or 

changes  Her neurologic deficit and altered mental status have resolved.  She 

has not been using CPAP for the last 4 days.  She was found to have CO2 in the 

60s.  She was placed on BiPAP.  She will continue with BiPAP at night. Carotid 

duplex showed moderate stenosis on right will need vascular surgery follow up








(2) Hypercarbia


Is this a current diagnosis for this admission?: YesPlan: 





Patient with history of obstructive sleep apnea requiring CPAP at at bedtime.  

She has been noncompliant since discharge from rehab continue CPAP at bedtime 

and as needed.  Consult discharge planning she would be best served in an 

assisted living environment or skilled nursing facility.








(3) Acute renal failure


Qualifiers: 


     Acute renal failure type: unspecified     Qualified Code(s): N17.9 - Acute 

kidney failure, unspecified  


Is this a current diagnosis for this admission?: YesPlan: 


Resolved secondary to prerenal volume depletion








(4) Anemia


Qualifiers: 


     Anemia type: unspecified type        Qualified Code(s): D64.9 - Anemia, 

unspecified  


Is this a current diagnosis for this admission?: YesPlan: 


Continue to monitor, presently stable








(5) Diastolic CHF


Qualifiers: 


     Congestive heart failure chronicity: chronic        Qualified Code(s): 

I50.32 - Chronic diastolic (congestive) heart failure  


Is this a current diagnosis for this admission?: YesPlan: 


Appears euvolemic








(6) History of CVA (cerebrovascular accident)


Is this a current diagnosis for this admission?: YesPlan: 


Mild left sided weakness in the left leg. No new deficits








(7) GOSIA (obstructive sleep apnea)


Is this a current diagnosis for this admission?: YesPlan: 


BIPAP at HS and prn








(8) Rheumatoid arthritis


Qualifiers: 


     Laterality: unspecified laterality


Is this a current diagnosis for this admission?: YesPlan: 


 Continue home medications








(9) Morbid obesity


Qualifiers: 


     Obesity type: due to excess calories        Qualified Code(s): E66.01 - 

Morbid (severe) obesity due to excess calories  


Is this a current diagnosis for this admission?: YesPlan: 


Patient counseled











- Time


Time Spent with patient: 25-34 minutes


Critical Time spent with patient: 15-24 minutes


Medications reviewed and adjusted accordingly: Yes


Anticipated discharge: SNF


Within: when bed available

## 2017-06-22 RX ADMIN — SPIRONOLACTONE SCH MG: 25 TABLET, FILM COATED ORAL at 09:20

## 2017-06-22 RX ADMIN — HEPARIN SODIUM SCH UNIT: 5000 INJECTION, SOLUTION INTRAVENOUS; SUBCUTANEOUS at 07:04

## 2017-06-22 RX ADMIN — HEPARIN SODIUM SCH UNIT: 5000 INJECTION, SOLUTION INTRAVENOUS; SUBCUTANEOUS at 13:19

## 2017-06-22 RX ADMIN — POTASSIUM CHLORIDE SCH MEQ: 750 TABLET, FILM COATED, EXTENDED RELEASE ORAL at 09:20

## 2017-06-22 RX ADMIN — METOPROLOL SUCCINATE SCH MG: 50 TABLET, EXTENDED RELEASE ORAL at 09:30

## 2017-06-22 RX ADMIN — MONTELUKAST SODIUM SCH MG: 10 TABLET, FILM COATED ORAL at 22:38

## 2017-06-22 RX ADMIN — HEPARIN SODIUM SCH UNIT: 5000 INJECTION, SOLUTION INTRAVENOUS; SUBCUTANEOUS at 22:39

## 2017-06-22 RX ADMIN — CETIRIZINE HYDROCHLORIDE SCH MG: 10 TABLET, FILM COATED ORAL at 09:31

## 2017-06-22 RX ADMIN — DULOXETINE SCH MG: 30 CAPSULE, DELAYED RELEASE ORAL at 22:38

## 2017-06-22 RX ADMIN — DOCUSATE SODIUM SCH MG: 100 CAPSULE, LIQUID FILLED ORAL at 17:24

## 2017-06-22 RX ADMIN — METOPROLOL SUCCINATE SCH MG: 50 TABLET, EXTENDED RELEASE ORAL at 22:38

## 2017-06-22 RX ADMIN — ASPIRIN 325 MG ORAL TABLET SCH MG: 325 PILL ORAL at 09:18

## 2017-06-22 RX ADMIN — FOLIC ACID SCH MG: 1 TABLET ORAL at 09:29

## 2017-06-22 RX ADMIN — POLYETHYLENE GLYCOL 3350 SCH GM: 17 POWDER, FOR SOLUTION ORAL at 13:19

## 2017-06-22 RX ADMIN — ACETAMINOPHEN PRN MG: 325 TABLET ORAL at 07:04

## 2017-06-22 RX ADMIN — PREGABALIN SCH MG: 75 CAPSULE ORAL at 22:38

## 2017-06-22 RX ADMIN — FUROSEMIDE SCH MG: 80 TABLET ORAL at 09:19

## 2017-06-22 RX ADMIN — CELECOXIB SCH MG: 200 CAPSULE ORAL at 09:18

## 2017-06-22 RX ADMIN — Medication SCH ML: at 07:05

## 2017-06-22 RX ADMIN — VERAPAMIL HYDROCHLORIDE SCH MG: 120 TABLET, FILM COATED ORAL at 09:31

## 2017-06-22 RX ADMIN — SULFASALAZINE SCH MG: 500 TABLET, DELAYED RELEASE ORAL at 22:40

## 2017-06-22 RX ADMIN — CELECOXIB SCH MG: 200 CAPSULE ORAL at 22:39

## 2017-06-22 RX ADMIN — DULOXETINE SCH MG: 30 CAPSULE, DELAYED RELEASE ORAL at 09:21

## 2017-06-22 RX ADMIN — METFORMIN HYDROCHLORIDE SCH MG: 500 TABLET, FILM COATED ORAL at 15:56

## 2017-06-22 RX ADMIN — LISINOPRIL SCH MG: 5 TABLET ORAL at 09:29

## 2017-06-22 RX ADMIN — METFORMIN HYDROCHLORIDE SCH MG: 500 TABLET, FILM COATED ORAL at 09:17

## 2017-06-22 RX ADMIN — SULFASALAZINE SCH MG: 500 TABLET, DELAYED RELEASE ORAL at 07:04

## 2017-06-22 RX ADMIN — Medication SCH ML: at 13:20

## 2017-06-22 RX ADMIN — FAMOTIDINE SCH MG: 20 TABLET, FILM COATED ORAL at 22:38

## 2017-06-22 RX ADMIN — SULFASALAZINE SCH MG: 500 TABLET, DELAYED RELEASE ORAL at 13:20

## 2017-06-22 RX ADMIN — Medication SCH ML: at 22:40

## 2017-06-22 RX ADMIN — ATORVASTATIN CALCIUM SCH MG: 40 TABLET, FILM COATED ORAL at 22:38

## 2017-06-22 RX ADMIN — DOCUSATE SODIUM SCH MG: 100 CAPSULE, LIQUID FILLED ORAL at 09:31

## 2017-06-22 RX ADMIN — PREGABALIN SCH MG: 75 CAPSULE ORAL at 09:20

## 2017-06-22 RX ADMIN — CLOPIDOGREL BISULFATE SCH MG: 75 TABLET, FILM COATED ORAL at 09:29

## 2017-06-22 NOTE — PDOC PROGRESS REPORT
Subjective


Progress Note for:: 06/22/17


Subjective:: 


Patient seen on morning rounds.  She is resting in bed at the present time.  

She awakens easily to verbal stimuli.  She is able to move all extremities 4, 

she has mild weakness in the left lower extremity chronically.  She has no 

neurological deficits at the present time.  She states she wants to get out of 

bed    She denies any complaints at present time.  She denies any shortness of 

breath, dyspnea or cough.  She denies any chest pain, headache or dizziness.  

She denies any nausea, vomiting or diarrhea.  Rest of the review of systems are 

negative.





Physical Exam


Vital Signs: 


 











Temp Pulse Resp BP Pulse Ox


 


 98.4 F   88   16   116/84   96 


 


 06/22/17 07:32  06/22/17 08:49  06/22/17 07:32  06/22/17 07:32  06/22/17 07:32








 Intake & Output











 06/21/17 06/22/17 06/23/17





 06:59 06:59 06:59


 


Intake Total 2025 1770 


 


Output Total 3500 2800 


 


Balance -1475 -1030 


 


Weight 142.7 kg 140.4 kg 











General appearance: PRESENT: no acute distress, morbidly obese, well-developed, 

well-nourished


Head exam: PRESENT: atraumatic, normocephalic


Eye exam: PRESENT: conjunctiva pink, EOMI, PERRLA.  ABSENT: scleral icterus


Ear exam: PRESENT: normal external ear exam


Mouth exam: PRESENT: moist, tongue midline


Teeth exam: PRESENT: edentulous


Neck exam: ABSENT: carotid bruit, JVD, lymphadenopathy, thyromegaly


Respiratory exam: PRESENT: clear to auscultation kaley.  ABSENT: rales, rhonchi, 

wheezes


Cardiovascular exam: PRESENT: RRR.  ABSENT: diastolic murmur, rubs, systolic 

murmur


Pulses: PRESENT: normal dorsalis pedis pul


Vascular exam: PRESENT: normal capillary refill


GI/Abdominal exam: PRESENT: normal bowel sounds, soft.  ABSENT: distended, 

guarding, mass, organolmegaly, rebound, tenderness


Rectal exam: PRESENT: deferred


Extremities exam: PRESENT: full ROM.  ABSENT: calf tenderness, clubbing, pedal 

edema


Musculoskeletal exam: PRESENT: full ROM, normal inspection


Neurological exam: PRESENT: alert, awake, oriented to person, oriented to place

, oriented to time, oriented to situation, CN II-XII grossly intact.  ABSENT: 

motor sensory deficit


Psychiatric exam: PRESENT: appropriate affect, normal mood.  ABSENT: homicidal 

ideation, suicidal ideation


Skin exam: PRESENT: dry, intact, warm.  ABSENT: cyanosis, rash





Results


Laboratory Results: 


 





 06/21/17 04:51 





 06/21/17 04:51 





 











  06/19/17





  01:24


 


Troponin I  < 0.012











Impressions: 


 





Head CT  06/18/17 00:00


IMPRESSION:  No acute intracranial abnormality.


 








Chest X-Ray  06/18/17 16:39


IMPRESSION:  Small lung volumes with vascular crowding.  No focal infiltrates.


 








Hip X-Ray  06/18/17 16:40


IMPRESSION:  NEGATIVE STUDY OF THE LEFT HIP AND PELVIS. NO RADIOGRAPHIC 

EVIDENCE OF ACUTE INJURY.


 








Head MRI  06/19/17 00:32


IMPRESSION:  Age-appropriate MRI OF THE BRAIN WITHOUT AND WITH INTRAVENOUS 

GADOLINIUM CONTRAST.


 








Brain MRI with MRA  06/19/17 00:33


IMPRESSION:  NORMAL MRA OF THE [place] OF SENIOR.


 








Carotid Doppler Study  06/19/17 00:33


IMPRESSION:  LIMITED STUDY.  VELOCITY MEASUREMENTS SUGGEST 50- 69% STENOSIS OF 

THE LEFT INTERNAL CAROTID ARTERY BUT NO SIGNIFICANT PLAQUE DEMONSTRATED ON 

ULTRASOUND IMAGES.  THIS COULD BE DUE TO TORTUOSITY OF THE VESSEL.  NO 

HEMODYNAMICALLY SIGNIFICANT STENOSIS ON EITHER SIDE.


 














Assessment & Plan





- Diagnosis


(1) Hypercarbia


Is this a current diagnosis for this admission?: YesPlan: 





Patient with history of obstructive sleep apnea requiring CPAP at at bedtime.  

She has been noncompliant since discharge from rehab continue CPAP at bedtime 

and as needed.  Consult discharge planning she would be best served in an 

assisted living environment or skilled nursing facility.








(2) Anemia


Qualifiers: 


     Anemia type: unspecified type        Qualified Code(s): D64.9 - Anemia, 

unspecified  


Is this a current diagnosis for this admission?: YesPlan: 


Continue to monitor, presently stable








(3) Diastolic CHF


Qualifiers: 


     Congestive heart failure chronicity: chronic        Qualified Code(s): 

I50.32 - Chronic diastolic (congestive) heart failure  


Is this a current diagnosis for this admission?: YesPlan: 


Appears euvolemic








(4) History of CVA (cerebrovascular accident)


Is this a current diagnosis for this admission?: YesPlan: 


Mild left sided weakness in the left leg. No new deficits








(5) GOSIA (obstructive sleep apnea)


Is this a current diagnosis for this admission?: YesPlan: 


BIPAP at HS and prn








(6) Rheumatoid arthritis


Qualifiers: 


     Laterality: unspecified laterality


Is this a current diagnosis for this admission?: YesPlan: 


 Continue home medications








(7) Morbid obesity


Qualifiers: 


     Obesity type: due to excess calories        Qualified Code(s): E66.01 - 

Morbid (severe) obesity due to excess calories  


Is this a current diagnosis for this admission?: YesPlan: 


Patient counseled








(8) Encephalopathy acute


Is this a current diagnosis for this admission?: YesPlan: 


Resolved, likely secondary to hypercapnea and opiod pain medication








(9) Acute renal failure


Qualifiers: 


     Acute renal failure type: unspecified     Qualified Code(s): N17.9 - Acute 

kidney failure, unspecified  


Is this a current diagnosis for this admission?: YesPlan: 


Resolved secondary to prerenal volume depletion











- Time


Time Spent with patient: 25-34 minutes


Critical Time spent with patient: 25-34 minutes


Medications reviewed and adjusted accordingly: Yes


Anticipated discharge: SNF

## 2017-06-23 RX ADMIN — ATORVASTATIN CALCIUM SCH MG: 40 TABLET, FILM COATED ORAL at 21:31

## 2017-06-23 RX ADMIN — FUROSEMIDE SCH MG: 80 TABLET ORAL at 10:08

## 2017-06-23 RX ADMIN — DULOXETINE SCH MG: 30 CAPSULE, DELAYED RELEASE ORAL at 10:09

## 2017-06-23 RX ADMIN — SULFASALAZINE SCH MG: 500 TABLET, DELAYED RELEASE ORAL at 15:10

## 2017-06-23 RX ADMIN — HEPARIN SODIUM SCH UNIT: 5000 INJECTION, SOLUTION INTRAVENOUS; SUBCUTANEOUS at 21:30

## 2017-06-23 RX ADMIN — CELECOXIB SCH MG: 200 CAPSULE ORAL at 21:32

## 2017-06-23 RX ADMIN — VERAPAMIL HYDROCHLORIDE SCH MG: 120 TABLET, FILM COATED ORAL at 10:13

## 2017-06-23 RX ADMIN — DULOXETINE SCH MG: 30 CAPSULE, DELAYED RELEASE ORAL at 21:31

## 2017-06-23 RX ADMIN — ASPIRIN 325 MG ORAL TABLET SCH MG: 325 PILL ORAL at 10:11

## 2017-06-23 RX ADMIN — Medication SCH ML: at 21:35

## 2017-06-23 RX ADMIN — DOCUSATE SODIUM SCH MG: 100 CAPSULE, LIQUID FILLED ORAL at 10:10

## 2017-06-23 RX ADMIN — METOPROLOL SUCCINATE SCH MG: 50 TABLET, EXTENDED RELEASE ORAL at 10:11

## 2017-06-23 RX ADMIN — Medication SCH ML: at 15:10

## 2017-06-23 RX ADMIN — LISINOPRIL SCH MG: 5 TABLET ORAL at 10:12

## 2017-06-23 RX ADMIN — DOCUSATE SODIUM SCH MG: 100 CAPSULE, LIQUID FILLED ORAL at 17:28

## 2017-06-23 RX ADMIN — CETIRIZINE HYDROCHLORIDE SCH MG: 10 TABLET, FILM COATED ORAL at 10:12

## 2017-06-23 RX ADMIN — METFORMIN HYDROCHLORIDE SCH MG: 500 TABLET, FILM COATED ORAL at 17:27

## 2017-06-23 RX ADMIN — PREGABALIN SCH MG: 75 CAPSULE ORAL at 21:32

## 2017-06-23 RX ADMIN — POLYETHYLENE GLYCOL 3350 SCH GM: 17 POWDER, FOR SOLUTION ORAL at 10:13

## 2017-06-23 RX ADMIN — ACETAMINOPHEN PRN MG: 325 TABLET ORAL at 02:08

## 2017-06-23 RX ADMIN — CLOPIDOGREL BISULFATE SCH MG: 75 TABLET, FILM COATED ORAL at 10:09

## 2017-06-23 RX ADMIN — SULFASALAZINE SCH MG: 500 TABLET, DELAYED RELEASE ORAL at 21:31

## 2017-06-23 RX ADMIN — PREGABALIN SCH MG: 75 CAPSULE ORAL at 10:10

## 2017-06-23 RX ADMIN — CELECOXIB SCH MG: 200 CAPSULE ORAL at 10:08

## 2017-06-23 RX ADMIN — METFORMIN HYDROCHLORIDE SCH MG: 500 TABLET, FILM COATED ORAL at 10:07

## 2017-06-23 RX ADMIN — MONTELUKAST SODIUM SCH MG: 10 TABLET, FILM COATED ORAL at 21:32

## 2017-06-23 RX ADMIN — METOPROLOL SUCCINATE SCH MG: 50 TABLET, EXTENDED RELEASE ORAL at 21:30

## 2017-06-23 RX ADMIN — HEPARIN SODIUM SCH UNIT: 5000 INJECTION, SOLUTION INTRAVENOUS; SUBCUTANEOUS at 15:10

## 2017-06-23 RX ADMIN — POTASSIUM CHLORIDE SCH MEQ: 750 TABLET, FILM COATED, EXTENDED RELEASE ORAL at 10:08

## 2017-06-23 RX ADMIN — HEPARIN SODIUM SCH UNIT: 5000 INJECTION, SOLUTION INTRAVENOUS; SUBCUTANEOUS at 06:23

## 2017-06-23 RX ADMIN — FAMOTIDINE SCH MG: 20 TABLET, FILM COATED ORAL at 21:31

## 2017-06-23 RX ADMIN — SULFASALAZINE SCH MG: 500 TABLET, DELAYED RELEASE ORAL at 06:23

## 2017-06-23 RX ADMIN — Medication SCH ML: at 06:23

## 2017-06-23 RX ADMIN — SPIRONOLACTONE SCH MG: 25 TABLET, FILM COATED ORAL at 10:09

## 2017-06-23 RX ADMIN — FOLIC ACID SCH MG: 1 TABLET ORAL at 10:11

## 2017-06-23 NOTE — PDOC PROGRESS REPORT
Subjective


Progress Note for:: 06/23/17


Subjective:: 


Patient seen on morning rounds.  She is resting in bed at the present time.  

She awakens easily to verbal stimuli.  She is able to move all extremities 4, 

she has mild weakness in the left lower extremity chronically.  She has no 

neurological deficits at the present time.  She states she wants to get out of 

bed    She denies any complaints at present time.  She denies any shortness of 

breath, dyspnea or cough.  She denies any chest pain, headache or dizziness.  

She denies any nausea, vomiting or diarrhea. She was constipated. She had a 

bowel movement last night that was tested positive for c diff. Patient is 

asymptomatic  Rest of the review of systems are negative.





Physical Exam


Vital Signs: 


 











Temp Pulse Resp BP Pulse Ox


 


 98.5 F   93   16   129/79 H  100 


 


 06/23/17 11:33  06/23/17 11:33  06/23/17 11:33  06/23/17 11:33  06/23/17 11:33








 Intake & Output











 06/22/17 06/23/17 06/24/17





 06:59 06:59 06:59


 


Intake Total 1770 640 600


 


Output Total 2800 250 700


 


Balance -1030 390 -100


 


Weight 140.4 kg 139.3 kg 











General appearance: PRESENT: no acute distress, morbidly obese, well-developed, 

well-nourished


Head exam: PRESENT: atraumatic, normocephalic


Eye exam: PRESENT: conjunctiva pink, EOMI, PERRLA.  ABSENT: scleral icterus


Ear exam: PRESENT: normal external ear exam


Mouth exam: PRESENT: moist, tongue midline


Neck exam: ABSENT: carotid bruit, JVD, lymphadenopathy, thyromegaly


Cardiovascular exam: PRESENT: RRR.  ABSENT: diastolic murmur, rubs, systolic 

murmur


Pulses: PRESENT: normal carotid pulses, normal radial pulses


Vascular exam: PRESENT: normal capillary refill


GI/Abdominal exam: PRESENT: normal bowel sounds, soft.  ABSENT: distended, 

guarding, mass, organolmegaly, rebound, tenderness


Rectal exam: PRESENT: deferred


Extremities exam: PRESENT: full ROM.  ABSENT: calf tenderness, clubbing, pedal 

edema


Musculoskeletal exam: PRESENT: ambulatory, full ROM, normal inspection - 4/5 

left lower leg right 5/5, other


Neurological exam: PRESENT: alert, awake, oriented to person, oriented to place

, oriented to time, oriented to situation, CN II-XII grossly intact.  ABSENT: 

motor sensory deficit


Psychiatric exam: PRESENT: appropriate affect, normal mood.  ABSENT: homicidal 

ideation, suicidal ideation


Skin exam: PRESENT: dry, intact, warm.  ABSENT: cyanosis, rash





Results


Laboratory Results: 


 





 06/21/17 04:51 





 06/21/17 04:51 





 











  06/19/17





  01:24


 


Troponin I  < 0.012











Impressions: 


 





Head CT  06/18/17 00:00


IMPRESSION:  No acute intracranial abnormality.


 








Chest X-Ray  06/18/17 16:39


IMPRESSION:  Small lung volumes with vascular crowding.  No focal infiltrates.


 








Hip X-Ray  06/18/17 16:40


IMPRESSION:  NEGATIVE STUDY OF THE LEFT HIP AND PELVIS. NO RADIOGRAPHIC 

EVIDENCE OF ACUTE INJURY.


 








Head MRI  06/19/17 00:32


IMPRESSION:  Age-appropriate MRI OF THE BRAIN WITHOUT AND WITH INTRAVENOUS 

GADOLINIUM CONTRAST.


 








Brain MRI with MRA  06/19/17 00:33


IMPRESSION:  NORMAL MRA OF THE [place] OF SENIOR.


 








Carotid Doppler Study  06/19/17 00:33


IMPRESSION:  LIMITED STUDY.  VELOCITY MEASUREMENTS SUGGEST 50- 69% STENOSIS OF 

THE LEFT INTERNAL CAROTID ARTERY BUT NO SIGNIFICANT PLAQUE DEMONSTRATED ON 

ULTRASOUND IMAGES.  THIS COULD BE DUE TO TORTUOSITY OF THE VESSEL.  NO 

HEMODYNAMICALLY SIGNIFICANT STENOSIS ON EITHER SIDE.


 














Assessment & Plan





- Diagnosis


(1) Hypercarbia


Is this a current diagnosis for this admission?: YesPlan: 





Patient with history of obstructive sleep apnea requiring CPAP at at bedtime.  

She has been noncompliant since discharge from rehab continue CPAP at bedtime 

and as needed.  Consult discharge planning she would be best served in an 

assisted living environment or skilled nursing facility.








(2) Anemia


Qualifiers: 


     Anemia type: unspecified type        Qualified Code(s): D64.9 - Anemia, 

unspecified  


Is this a current diagnosis for this admission?: YesPlan: 


Continue to monitor, presently stable








(3) Diastolic CHF


Qualifiers: 


     Congestive heart failure chronicity: chronic        Qualified Code(s): 

I50.32 - Chronic diastolic (congestive) heart failure  


Is this a current diagnosis for this admission?: YesPlan: 


Appears euvolemic








(4) History of CVA (cerebrovascular accident)


Is this a current diagnosis for this admission?: YesPlan: 


Mild left sided weakness in the left leg. No new deficits








(5) GOSIA (obstructive sleep apnea)


Is this a current diagnosis for this admission?: YesPlan: 


BIPAP at HS and prn








(6) Rheumatoid arthritis


Qualifiers: 


     Laterality: unspecified laterality


Is this a current diagnosis for this admission?: YesPlan: 


 Continue home medications








(7) Morbid obesity


Qualifiers: 


     Obesity type: due to excess calories        Qualified Code(s): E66.01 - 

Morbid (severe) obesity due to excess calories  


Is this a current diagnosis for this admission?: YesPlan: 


Patient counseled








(8) Encephalopathy acute


Is this a current diagnosis for this admission?: YesPlan: 


Resolved. Likely secondary to hypercapnea








(9) Acute renal failure


Qualifiers: 


     Acute renal failure type: unspecified     Qualified Code(s): N17.9 - Acute 

kidney failure, unspecified  


Is this a current diagnosis for this admission?: YesPlan: 


Resolved secondary to prerenal volume depletion











- Time


Time Spent with patient: 25-34 minutes


Critical Time spent with patient: 15-24 minutes


Medications reviewed and adjusted accordingly: Yes


Anticipated discharge: SNF


Within: when bed available

## 2017-06-24 LAB
ANION GAP SERPL CALC-SCNC: 13 MMOL/L (ref 5–19)
BASOPHILS # BLD AUTO: 0 10^3/UL (ref 0–0.2)
BASOPHILS NFR BLD AUTO: 0.7 % (ref 0–2)
BUN SERPL-MCNC: 21 MG/DL (ref 7–20)
CALCIUM: 9.2 MG/DL (ref 8.4–10.2)
CHLORIDE SERPL-SCNC: 97 MMOL/L (ref 98–107)
CK MB SERPL-MCNC: < 0.22 NG/ML (ref ?–4.55)
CK SERPL-CCNC: 353 U/L (ref 30–135)
CO2 SERPL-SCNC: 29 MMOL/L (ref 22–30)
CREAT SERPL-MCNC: 1.07 MG/DL (ref 0.52–1.25)
EOSINOPHIL # BLD AUTO: 0.1 10^3/UL (ref 0–0.6)
EOSINOPHIL NFR BLD AUTO: 1.7 % (ref 0–6)
ERYTHROCYTE [DISTWIDTH] IN BLOOD BY AUTOMATED COUNT: 17.5 % (ref 11.5–14)
GLUCOSE SERPL-MCNC: 155 MG/DL (ref 75–110)
HCT VFR BLD CALC: 37.8 % (ref 36–47)
HGB BLD-MCNC: 11.9 G/DL (ref 12–15.5)
HGB HCT DIFFERENCE: -2.1
LYMPHOCYTES # BLD AUTO: 2.5 10^3/UL (ref 0.5–4.7)
LYMPHOCYTES NFR BLD AUTO: 36.3 % (ref 13–45)
MCH RBC QN AUTO: 24 PG (ref 27–33.4)
MCHC RBC AUTO-ENTMCNC: 31.5 G/DL (ref 32–36)
MCV RBC AUTO: 76 FL (ref 80–97)
MONOCYTES # BLD AUTO: 0.7 10^3/UL (ref 0.1–1.4)
MONOCYTES NFR BLD AUTO: 10.2 % (ref 3–13)
NEUTROPHILS # BLD AUTO: 3.5 10^3/UL (ref 1.7–8.2)
NEUTS SEG NFR BLD AUTO: 51.1 % (ref 42–78)
POTASSIUM SERPL-SCNC: 4.9 MMOL/L (ref 3.6–5)
RBC # BLD AUTO: 4.95 10^6/UL (ref 3.72–5.28)
SODIUM SERPL-SCNC: 139.2 MMOL/L (ref 137–145)
TROPONIN I SERPL-MCNC: < 0.012 NG/ML
WBC # BLD AUTO: 6.8 10^3/UL (ref 4–10.5)

## 2017-06-24 RX ADMIN — SPIRONOLACTONE SCH MG: 25 TABLET, FILM COATED ORAL at 09:08

## 2017-06-24 RX ADMIN — POTASSIUM CHLORIDE SCH MEQ: 750 TABLET, FILM COATED, EXTENDED RELEASE ORAL at 09:07

## 2017-06-24 RX ADMIN — FUROSEMIDE SCH MG: 80 TABLET ORAL at 09:08

## 2017-06-24 RX ADMIN — METFORMIN HYDROCHLORIDE SCH MG: 500 TABLET, FILM COATED ORAL at 09:08

## 2017-06-24 RX ADMIN — Medication SCH ML: at 07:15

## 2017-06-24 RX ADMIN — METOPROLOL SUCCINATE SCH MG: 50 TABLET, EXTENDED RELEASE ORAL at 21:21

## 2017-06-24 RX ADMIN — FAMOTIDINE SCH MG: 20 TABLET, FILM COATED ORAL at 21:20

## 2017-06-24 RX ADMIN — FOLIC ACID SCH MG: 1 TABLET ORAL at 09:08

## 2017-06-24 RX ADMIN — DULOXETINE SCH MG: 30 CAPSULE, DELAYED RELEASE ORAL at 09:06

## 2017-06-24 RX ADMIN — Medication SCH ML: at 21:20

## 2017-06-24 RX ADMIN — METOPROLOL SUCCINATE SCH MG: 50 TABLET, EXTENDED RELEASE ORAL at 09:07

## 2017-06-24 RX ADMIN — DULOXETINE SCH MG: 30 CAPSULE, DELAYED RELEASE ORAL at 21:20

## 2017-06-24 RX ADMIN — DOCUSATE SODIUM SCH MG: 100 CAPSULE, LIQUID FILLED ORAL at 16:36

## 2017-06-24 RX ADMIN — CELECOXIB SCH MG: 200 CAPSULE ORAL at 09:08

## 2017-06-24 RX ADMIN — HEPARIN SODIUM SCH UNIT: 5000 INJECTION, SOLUTION INTRAVENOUS; SUBCUTANEOUS at 21:19

## 2017-06-24 RX ADMIN — VERAPAMIL HYDROCHLORIDE SCH MG: 120 TABLET, FILM COATED ORAL at 09:07

## 2017-06-24 RX ADMIN — MONTELUKAST SODIUM SCH MG: 10 TABLET, FILM COATED ORAL at 21:21

## 2017-06-24 RX ADMIN — POLYETHYLENE GLYCOL 3350 SCH GM: 17 POWDER, FOR SOLUTION ORAL at 09:09

## 2017-06-24 RX ADMIN — HEPARIN SODIUM SCH UNIT: 5000 INJECTION, SOLUTION INTRAVENOUS; SUBCUTANEOUS at 16:35

## 2017-06-24 RX ADMIN — LISINOPRIL SCH MG: 5 TABLET ORAL at 09:07

## 2017-06-24 RX ADMIN — HEPARIN SODIUM SCH UNIT: 5000 INJECTION, SOLUTION INTRAVENOUS; SUBCUTANEOUS at 07:14

## 2017-06-24 RX ADMIN — DOCUSATE SODIUM SCH MG: 100 CAPSULE, LIQUID FILLED ORAL at 09:09

## 2017-06-24 RX ADMIN — PREGABALIN SCH MG: 75 CAPSULE ORAL at 09:07

## 2017-06-24 RX ADMIN — CETIRIZINE HYDROCHLORIDE SCH MG: 10 TABLET, FILM COATED ORAL at 09:08

## 2017-06-24 RX ADMIN — ACETAMINOPHEN PRN MG: 325 TABLET ORAL at 19:52

## 2017-06-24 RX ADMIN — METFORMIN HYDROCHLORIDE SCH MG: 500 TABLET, FILM COATED ORAL at 16:36

## 2017-06-24 RX ADMIN — SULFASALAZINE SCH MG: 500 TABLET, DELAYED RELEASE ORAL at 07:14

## 2017-06-24 RX ADMIN — CELECOXIB SCH MG: 200 CAPSULE ORAL at 21:21

## 2017-06-24 RX ADMIN — SULFASALAZINE SCH MG: 500 TABLET, DELAYED RELEASE ORAL at 21:19

## 2017-06-24 RX ADMIN — ASPIRIN 325 MG ORAL TABLET SCH MG: 325 PILL ORAL at 09:07

## 2017-06-24 RX ADMIN — SULFASALAZINE SCH MG: 500 TABLET, DELAYED RELEASE ORAL at 16:36

## 2017-06-24 RX ADMIN — PREGABALIN SCH MG: 75 CAPSULE ORAL at 21:20

## 2017-06-24 RX ADMIN — ATORVASTATIN CALCIUM SCH MG: 40 TABLET, FILM COATED ORAL at 21:21

## 2017-06-24 RX ADMIN — CLOPIDOGREL BISULFATE SCH MG: 75 TABLET, FILM COATED ORAL at 09:07

## 2017-06-24 RX ADMIN — Medication SCH ML: at 14:02

## 2017-06-24 NOTE — PDOC PROGRESS REPORT
Subjective


Progress Note for:: 06/24/17


Subjective:: 


Patient seen on morning rounds.  She is resting in bed at the present time.  

She awakens easily to verbal stimuli.  She is able to move all extremities 4, 

she has mild weakness in the left lower extremity chronically.  She has no 

neurological deficits at the present time.  She states she wants to get out of 

bed    She denies any complaints at present time.  She denies any shortness of 

breath, dyspnea or cough.  She denies any chest pain, headache or dizziness.  

She states last evening she began having diarrhea. She tested positive for c 

diff last night and is on flagyl. She has not been on any recent antibiotics.  

Rest of the review of systems are negative.





Physical Exam


Vital Signs: 


 











Temp Pulse Resp BP Pulse Ox


 


 97.9 F   92   20   123/78   96 


 


 06/24/17 07:39  06/24/17 07:39  06/24/17 07:39  06/24/17 07:39  06/24/17 07:39








 Intake & Output











 06/23/17 06/24/17 06/25/17





 06:59 06:59 06:59


 


Intake Total 640 1175 


 


Output Total 250 1200 


 


Balance 390 -25 


 


Weight 139.3 kg 139.2 kg 











General appearance: PRESENT: no acute distress, morbidly obese, well-developed, 

well-nourished


Head exam: PRESENT: atraumatic, normocephalic


Eye exam: PRESENT: conjunctiva pink, EOMI, PERRLA.  ABSENT: scleral icterus


Ear exam: PRESENT: normal external ear exam


Mouth exam: PRESENT: moist, tongue midline


Respiratory exam: PRESENT: clear to auscultation kaley.  ABSENT: rales, rhonchi, 

wheezes


Cardiovascular exam: PRESENT: RRR.  ABSENT: diastolic murmur, rubs, systolic 

murmur


Pulses: PRESENT: normal carotid pulses, normal radial pulses


Vascular exam: PRESENT: normal capillary refill


GI/Abdominal exam: PRESENT: hyperactive bowel sounds, soft


Rectal exam: PRESENT: deferred


Extremities exam: PRESENT: +1 edema - left lower leg


Musculoskeletal exam: PRESENT: ambulatory, full ROM, normal inspection


Neurological exam: PRESENT: alert, awake, oriented to person, oriented to place

, oriented to time, oriented to situation, abnormal gait, CN II-XII grossly 

intact - left lower extremity 4/5 muscle strength


Psychiatric exam: PRESENT: appropriate affect, normal mood.  ABSENT: homicidal 

ideation, suicidal ideation


Skin exam: PRESENT: dry, intact, warm.  ABSENT: cyanosis, rash





Results


Laboratory Results: 


 





 06/21/17 04:51 





 06/21/17 04:51 





 











  06/19/17





  01:24


 


Troponin I  < 0.012











Impressions: 


 





Head CT  06/18/17 00:00


IMPRESSION:  No acute intracranial abnormality.


 








Chest X-Ray  06/18/17 16:39


IMPRESSION:  Small lung volumes with vascular crowding.  No focal infiltrates.


 








Hip X-Ray  06/18/17 16:40


IMPRESSION:  NEGATIVE STUDY OF THE LEFT HIP AND PELVIS. NO RADIOGRAPHIC 

EVIDENCE OF ACUTE INJURY.


 








Head MRI  06/19/17 00:32


IMPRESSION:  Age-appropriate MRI OF THE BRAIN WITHOUT AND WITH INTRAVENOUS 

GADOLINIUM CONTRAST.


 








Brain MRI with MRA  06/19/17 00:33


IMPRESSION:  NORMAL MRA OF THE [place] OF SENIOR.


 








Carotid Doppler Study  06/19/17 00:33


IMPRESSION:  LIMITED STUDY.  VELOCITY MEASUREMENTS SUGGEST 50- 69% STENOSIS OF 

THE LEFT INTERNAL CAROTID ARTERY BUT NO SIGNIFICANT PLAQUE DEMONSTRATED ON 

ULTRASOUND IMAGES.  THIS COULD BE DUE TO TORTUOSITY OF THE VESSEL.  NO 

HEMODYNAMICALLY SIGNIFICANT STENOSIS ON EITHER SIDE.


 














Assessment & Plan





- Diagnosis


(1) Hypercarbia


Is this a current diagnosis for this admission?: YesPlan: 





Patient with history of obstructive sleep apnea requiring CPAP at at bedtime.  

She has been noncompliant since discharge from rehab continue CPAP at bedtime 

and as needed.  Consult discharge planning she would be best served in an 

assisted living environment or skilled nursing facility.








(2) Anemia


Qualifiers: 


     Anemia type: unspecified type        Qualified Code(s): D64.9 - Anemia, 

unspecified  


Is this a current diagnosis for this admission?: YesPlan: 


Continue to monitor, presently stable








(3) C. difficile diarrhea


Is this a current diagnosis for this admission?: No





(4) Diastolic CHF


Qualifiers: 


     Congestive heart failure chronicity: chronic        Qualified Code(s): 

I50.32 - Chronic diastolic (congestive) heart failure  


Is this a current diagnosis for this admission?: YesPlan: 


Appears euvolemic








(5) GOSIA (obstructive sleep apnea)


Is this a current diagnosis for this admission?: YesPlan: 


BIPAP at HS and prn








(6) History of CVA (cerebrovascular accident)


Is this a current diagnosis for this admission?: Yes





(7) Rheumatoid arthritis


Qualifiers: 


     Laterality: unspecified laterality


Is this a current diagnosis for this admission?: Yes





(8) Morbid obesity


Qualifiers: 


     Obesity type: due to excess calories        Qualified Code(s): E66.01 - 

Morbid (severe) obesity due to excess calories  


Is this a current diagnosis for this admission?: YesPlan: 


Patient counseled








(9) Encephalopathy acute


Is this a current diagnosis for this admission?: YesPlan: 


Resolved. Likely secondary to hypercapnea








(10) Acute renal failure


Qualifiers: 


     Acute renal failure type: unspecified     Qualified Code(s): N17.9 - Acute 

kidney failure, unspecified  


Is this a current diagnosis for this admission?: YesPlan: 


Resolved secondary to prerenal volume depletion











- Time


Time Spent with patient: 25-34 minutes


Critical Time spent with patient: 15-24 minutes


Medications reviewed and adjusted accordingly: Yes


Anticipated discharge: SNF


Within: when bed available

## 2017-06-25 LAB
CK MB SERPL-MCNC: < 0.22 NG/ML (ref ?–4.55)
CK MB SERPL-MCNC: < 0.22 NG/ML (ref ?–4.55)
TROPONIN I SERPL-MCNC: < 0.012 NG/ML
TROPONIN I SERPL-MCNC: < 0.012 NG/ML

## 2017-06-25 RX ADMIN — DULOXETINE SCH MG: 30 CAPSULE, DELAYED RELEASE ORAL at 08:45

## 2017-06-25 RX ADMIN — DEXAMETHASONE SODIUM PHOSPHATE PRN MG: 10 INJECTION INTRAMUSCULAR; INTRAVENOUS at 13:28

## 2017-06-25 RX ADMIN — CETIRIZINE HYDROCHLORIDE SCH MG: 10 TABLET, FILM COATED ORAL at 08:45

## 2017-06-25 RX ADMIN — SULFASALAZINE SCH MG: 500 TABLET, DELAYED RELEASE ORAL at 21:55

## 2017-06-25 RX ADMIN — VERAPAMIL HYDROCHLORIDE SCH MG: 120 TABLET, FILM COATED ORAL at 08:46

## 2017-06-25 RX ADMIN — METFORMIN HYDROCHLORIDE SCH MG: 500 TABLET, FILM COATED ORAL at 08:44

## 2017-06-25 RX ADMIN — Medication SCH ML: at 05:44

## 2017-06-25 RX ADMIN — PROBIOTIC PRODUCT - TAB SCH MG: TAB at 17:23

## 2017-06-25 RX ADMIN — Medication SCH ML: at 21:57

## 2017-06-25 RX ADMIN — Medication SCH MG: at 17:24

## 2017-06-25 RX ADMIN — ATORVASTATIN CALCIUM SCH MG: 40 TABLET, FILM COATED ORAL at 21:56

## 2017-06-25 RX ADMIN — FAMOTIDINE SCH MG: 20 TABLET, FILM COATED ORAL at 21:55

## 2017-06-25 RX ADMIN — FOLIC ACID SCH MG: 1 TABLET ORAL at 08:46

## 2017-06-25 RX ADMIN — MONTELUKAST SODIUM SCH MG: 10 TABLET, FILM COATED ORAL at 21:56

## 2017-06-25 RX ADMIN — CELECOXIB SCH MG: 200 CAPSULE ORAL at 21:56

## 2017-06-25 RX ADMIN — CELECOXIB SCH MG: 200 CAPSULE ORAL at 08:44

## 2017-06-25 RX ADMIN — Medication SCH ML: at 13:27

## 2017-06-25 RX ADMIN — PREGABALIN SCH MG: 75 CAPSULE ORAL at 08:42

## 2017-06-25 RX ADMIN — Medication SCH MG: at 12:04

## 2017-06-25 RX ADMIN — METOPROLOL SUCCINATE SCH MG: 50 TABLET, EXTENDED RELEASE ORAL at 21:54

## 2017-06-25 RX ADMIN — LISINOPRIL SCH MG: 5 TABLET ORAL at 08:45

## 2017-06-25 RX ADMIN — HEPARIN SODIUM SCH UNIT: 5000 INJECTION, SOLUTION INTRAVENOUS; SUBCUTANEOUS at 22:01

## 2017-06-25 RX ADMIN — METFORMIN HYDROCHLORIDE SCH MG: 500 TABLET, FILM COATED ORAL at 17:23

## 2017-06-25 RX ADMIN — SULFASALAZINE SCH MG: 500 TABLET, DELAYED RELEASE ORAL at 13:28

## 2017-06-25 RX ADMIN — CLOPIDOGREL BISULFATE SCH MG: 75 TABLET, FILM COATED ORAL at 08:46

## 2017-06-25 RX ADMIN — DULOXETINE SCH MG: 30 CAPSULE, DELAYED RELEASE ORAL at 21:55

## 2017-06-25 RX ADMIN — ASPIRIN 325 MG ORAL TABLET SCH MG: 325 PILL ORAL at 08:42

## 2017-06-25 RX ADMIN — POLYETHYLENE GLYCOL 3350 SCH GM: 17 POWDER, FOR SOLUTION ORAL at 08:47

## 2017-06-25 RX ADMIN — SULFASALAZINE SCH MG: 500 TABLET, DELAYED RELEASE ORAL at 05:44

## 2017-06-25 RX ADMIN — SPIRONOLACTONE SCH MG: 25 TABLET, FILM COATED ORAL at 08:46

## 2017-06-25 RX ADMIN — POTASSIUM CHLORIDE SCH MEQ: 750 TABLET, FILM COATED, EXTENDED RELEASE ORAL at 08:41

## 2017-06-25 RX ADMIN — HEPARIN SODIUM SCH UNIT: 5000 INJECTION, SOLUTION INTRAVENOUS; SUBCUTANEOUS at 05:44

## 2017-06-25 RX ADMIN — METOPROLOL SUCCINATE SCH MG: 50 TABLET, EXTENDED RELEASE ORAL at 08:43

## 2017-06-25 RX ADMIN — HEPARIN SODIUM SCH UNIT: 5000 INJECTION, SOLUTION INTRAVENOUS; SUBCUTANEOUS at 13:24

## 2017-06-25 RX ADMIN — PREGABALIN SCH MG: 75 CAPSULE ORAL at 21:55

## 2017-06-25 NOTE — PDOC PROGRESS REPORT
Subjective


Progress Note for:: 06/25/17


Subjective:: 


Patient seen on morning rounds.  She is resting in bed at the present time.  

She had several episodes of nausea and vomiting last night. Her nurse states it 

began after she was given her evening dose of flagyl. She is still having some 

diarrhea, but it is improved. She is able to move all extremities 4, she has 

mild weakness in the left lower extremity chronically.  She has no neurological 

deficits at the present time.  She states she wants to get out of bed    She 

denies any complaints at present time.  She denies any shortness of breath, 

dyspnea or cough.  She denies any chest pain, headache or dizziness.   She has 

not been on any recent antibiotics.  Rest of the review of systems are negative.





Physical Exam


Vital Signs: 


 











Temp Pulse Resp BP Pulse Ox


 


 98.2 F   115 H  19   129/82 H  93 


 


 06/25/17 08:05  06/25/17 08:05  06/25/17 08:05  06/25/17 08:05  06/25/17 08:05








 Intake & Output











 06/24/17 06/25/17 06/26/17





 06:59 06:59 06:59


 


Intake Total 1175 1272 


 


Output Total 1200  


 


Balance -25 1272 


 


Weight 139.2 kg 139 kg 











General appearance: PRESENT: no acute distress, morbidly obese, well-developed, 

well-nourished


Head exam: PRESENT: atraumatic, normocephalic


Eye exam: PRESENT: conjunctiva pink, EOMI, PERRLA.  ABSENT: scleral icterus


Ear exam: PRESENT: normal external ear exam


Mouth exam: PRESENT: moist, tongue midline


Neck exam: ABSENT: carotid bruit, JVD, lymphadenopathy, thyromegaly


Respiratory exam: PRESENT: clear to auscultation kaley.  ABSENT: rales, rhonchi, 

wheezes


Cardiovascular exam: PRESENT: RRR.  ABSENT: diastolic murmur, rubs, systolic 

murmur


Pulses: PRESENT: normal dorsalis pedis pul


Vascular exam: PRESENT: normal capillary refill


GI/Abdominal exam: PRESENT: hyperactive bowel sounds, soft.  ABSENT: distended, 

guarding, mass, organolmegaly, rebound, tenderness


Rectal exam: PRESENT: deferred


Extremities exam: PRESENT: full ROM.  ABSENT: calf tenderness, clubbing, pedal 

edema


Musculoskeletal exam: PRESENT: ambulatory, full ROM, normal inspection


Neurological exam: PRESENT: alert, awake, oriented to person, oriented to place

, oriented to time, oriented to situation, CN II-XII grossly intact.  ABSENT: 

motor sensory deficit


Psychiatric exam: PRESENT: appropriate affect, normal mood.  ABSENT: homicidal 

ideation, suicidal ideation


Skin exam: PRESENT: dry, intact, warm.  ABSENT: cyanosis, rash





Results


Laboratory Results: 


 





 06/24/17 22:00 





 06/24/17 22:00 





 











  06/24/17 06/24/17





  22:00 22:00


 


WBC  6.8 


 


RBC  4.95 


 


Hgb  11.9 L 


 


Hct  37.8 


 


MCV  76 L 


 


MCH  24.0 L 


 


MCHC  31.5 L 


 


RDW  17.5 H 


 


Plt Count  265 


 


Seg Neutrophils %  51.1 


 


Lymphocytes %  36.3 


 


Monocytes %  10.2 


 


Eosinophils %  1.7 


 


Basophils %  0.7 


 


Absolute Neutrophils  3.5 


 


Absolute Lymphocytes  2.5 


 


Absolute Monocytes  0.7 


 


Absolute Eosinophils  0.1 


 


Absolute Basophils  0.0 


 


Sodium   139.2


 


Potassium   4.9


 


Chloride   97 L


 


Carbon Dioxide   29


 


Anion Gap   13


 


BUN   21 H


 


Creatinine   1.07


 


Est GFR ( Amer)   > 60


 


Est GFR (Non-Af Amer)   53 L


 


Glucose   155 H


 


Calcium   9.2








 











  06/19/17 06/24/17 06/24/17





  01:24 22:00 22:15


 


Creatine Kinase   353 H 


 


CK-MB (CK-2)    < 0.22


 


Troponin I  < 0.012   < 0.012














  06/25/17 06/25/17





  04:16 04:16


 


Creatine Kinase  261 H 


 


CK-MB (CK-2)   < 0.22


 


Troponin I   < 0.012











Impressions: 


 





Head CT  06/18/17 00:00


IMPRESSION:  No acute intracranial abnormality.


 








Chest X-Ray  06/18/17 16:39


IMPRESSION:  Small lung volumes with vascular crowding.  No focal infiltrates.


 








Hip X-Ray  06/18/17 16:40


IMPRESSION:  NEGATIVE STUDY OF THE LEFT HIP AND PELVIS. NO RADIOGRAPHIC 

EVIDENCE OF ACUTE INJURY.


 








Head MRI  06/19/17 00:32


IMPRESSION:  Age-appropriate MRI OF THE BRAIN WITHOUT AND WITH INTRAVENOUS 

GADOLINIUM CONTRAST.


 








Brain MRI with MRA  06/19/17 00:33


IMPRESSION:  NORMAL MRA OF THE [place] OF SENIOR.


 








Carotid Doppler Study  06/19/17 00:33


IMPRESSION:  LIMITED STUDY.  VELOCITY MEASUREMENTS SUGGEST 50- 69% STENOSIS OF 

THE LEFT INTERNAL CAROTID ARTERY BUT NO SIGNIFICANT PLAQUE DEMONSTRATED ON 

ULTRASOUND IMAGES.  THIS COULD BE DUE TO TORTUOSITY OF THE VESSEL.  NO 

HEMODYNAMICALLY SIGNIFICANT STENOSIS ON EITHER SIDE.


 














Assessment & Plan





- Diagnosis


(1) C. difficile diarrhea


Is this a current diagnosis for this admission?: YesPlan: 


Patient became nauseated and vomited after taking flagyl yesteday. Will change 

to oral vancomycin. Zofran prn








(2) Hypercarbia


Is this a current diagnosis for this admission?: YesPlan: 





Patient with history of obstructive sleep apnea requiring CPAP at at bedtime.  

She has been noncompliant since discharge from rehab continue CPAP at bedtime 

and as needed.  Consult discharge planning she would be best served in an 

assisted living environment or skilled nursing facility.








(3) Anemia


Qualifiers: 


     Anemia type: unspecified type        Qualified Code(s): D64.9 - Anemia, 

unspecified  


Is this a current diagnosis for this admission?: YesPlan: 


Continue to monitor, presently stable








(4) Diastolic CHF


Qualifiers: 


     Congestive heart failure chronicity: chronic        Qualified Code(s): 

I50.32 - Chronic diastolic (congestive) heart failure  


Is this a current diagnosis for this admission?: YesPlan: 


Appears euvolemic








(5) GOSIA (obstructive sleep apnea)


Is this a current diagnosis for this admission?: YesPlan: 


BIPAP at HS and prn








(6) History of CVA (cerebrovascular accident)


Is this a current diagnosis for this admission?: YesPlan: 


Mild left sided weakness in the left leg. No new deficits








(7) Rheumatoid arthritis


Qualifiers: 


     Laterality: unspecified laterality


Is this a current diagnosis for this admission?: YesPlan: 


 Continue home medications








(8) Morbid obesity


Qualifiers: 


     Obesity type: due to excess calories        Qualified Code(s): E66.01 - 

Morbid (severe) obesity due to excess calories  


Is this a current diagnosis for this admission?: YesPlan: 


Patient counseled








(9) Encephalopathy acute


Is this a current diagnosis for this admission?: YesPlan: 


Resolved. Likely secondary to hypercapnea








(10) Acute renal failure


Qualifiers: 


     Acute renal failure type: unspecified     Qualified Code(s): N17.9 - Acute 

kidney failure, unspecified  


Is this a current diagnosis for this admission?: YesPlan: 


Resolved secondary to prerenal volume depletion











- Time


Time Spent with patient: 25-34 minutes


Critical Time spent with patient: 15-24 minutes


Medications reviewed and adjusted accordingly: Yes


Anticipated discharge: SNF


Within: when bed available

## 2017-06-26 VITALS — SYSTOLIC BLOOD PRESSURE: 115 MMHG | DIASTOLIC BLOOD PRESSURE: 85 MMHG

## 2017-06-26 LAB
ANION GAP SERPL CALC-SCNC: 10 MMOL/L (ref 5–19)
BUN SERPL-MCNC: 19 MG/DL (ref 7–20)
CALCIUM: 9.2 MG/DL (ref 8.4–10.2)
CHLORIDE SERPL-SCNC: 101 MMOL/L (ref 98–107)
CO2 SERPL-SCNC: 29 MMOL/L (ref 22–30)
CREAT SERPL-MCNC: 1.17 MG/DL (ref 0.52–1.25)
GLUCOSE SERPL-MCNC: 150 MG/DL (ref 75–110)
POTASSIUM SERPL-SCNC: 4.2 MMOL/L (ref 3.6–5)
SODIUM SERPL-SCNC: 140.4 MMOL/L (ref 137–145)

## 2017-06-26 RX ADMIN — CLOPIDOGREL BISULFATE SCH MG: 75 TABLET, FILM COATED ORAL at 11:02

## 2017-06-26 RX ADMIN — SPIRONOLACTONE SCH MG: 25 TABLET, FILM COATED ORAL at 11:39

## 2017-06-26 RX ADMIN — POLYETHYLENE GLYCOL 3350 SCH GM: 17 POWDER, FOR SOLUTION ORAL at 11:39

## 2017-06-26 RX ADMIN — Medication SCH ML: at 06:34

## 2017-06-26 RX ADMIN — CELECOXIB SCH MG: 200 CAPSULE ORAL at 10:49

## 2017-06-26 RX ADMIN — Medication SCH MG: at 17:16

## 2017-06-26 RX ADMIN — Medication SCH ML: at 14:49

## 2017-06-26 RX ADMIN — METFORMIN HYDROCHLORIDE SCH MG: 500 TABLET, FILM COATED ORAL at 17:16

## 2017-06-26 RX ADMIN — ASPIRIN 325 MG ORAL TABLET SCH MG: 325 PILL ORAL at 10:48

## 2017-06-26 RX ADMIN — SULFASALAZINE SCH MG: 500 TABLET, DELAYED RELEASE ORAL at 14:49

## 2017-06-26 RX ADMIN — HEPARIN SODIUM SCH UNIT: 5000 INJECTION, SOLUTION INTRAVENOUS; SUBCUTANEOUS at 06:34

## 2017-06-26 RX ADMIN — DEXAMETHASONE SODIUM PHOSPHATE PRN MG: 10 INJECTION INTRAMUSCULAR; INTRAVENOUS at 18:29

## 2017-06-26 RX ADMIN — CETIRIZINE HYDROCHLORIDE SCH MG: 10 TABLET, FILM COATED ORAL at 10:49

## 2017-06-26 RX ADMIN — PROBIOTIC PRODUCT - TAB SCH MG: TAB at 10:47

## 2017-06-26 RX ADMIN — PROBIOTIC PRODUCT - TAB SCH MG: TAB at 17:16

## 2017-06-26 RX ADMIN — METFORMIN HYDROCHLORIDE SCH MG: 500 TABLET, FILM COATED ORAL at 08:25

## 2017-06-26 RX ADMIN — SULFASALAZINE SCH MG: 500 TABLET, DELAYED RELEASE ORAL at 06:34

## 2017-06-26 RX ADMIN — POTASSIUM CHLORIDE SCH MEQ: 750 TABLET, FILM COATED, EXTENDED RELEASE ORAL at 10:38

## 2017-06-26 RX ADMIN — LISINOPRIL SCH MG: 5 TABLET ORAL at 11:39

## 2017-06-26 RX ADMIN — VERAPAMIL HYDROCHLORIDE SCH MG: 120 TABLET, FILM COATED ORAL at 11:39

## 2017-06-26 RX ADMIN — DULOXETINE SCH MG: 30 CAPSULE, DELAYED RELEASE ORAL at 10:48

## 2017-06-26 RX ADMIN — FUROSEMIDE SCH MG: 80 TABLET ORAL at 10:38

## 2017-06-26 RX ADMIN — FOLIC ACID SCH MG: 1 TABLET ORAL at 10:38

## 2017-06-26 RX ADMIN — HEPARIN SODIUM SCH UNIT: 5000 INJECTION, SOLUTION INTRAVENOUS; SUBCUTANEOUS at 14:49

## 2017-06-26 RX ADMIN — Medication SCH MG: at 00:12

## 2017-06-26 RX ADMIN — Medication SCH MG: at 06:33

## 2017-06-26 RX ADMIN — Medication SCH MG: at 12:10

## 2017-06-26 RX ADMIN — PREGABALIN SCH MG: 75 CAPSULE ORAL at 10:48

## 2017-06-26 RX ADMIN — METOPROLOL SUCCINATE SCH MG: 50 TABLET, EXTENDED RELEASE ORAL at 11:39

## 2017-06-26 NOTE — RADIOLOGY REPORT (SQ)
EXAM DESCRIPTION:  KNEE LEFT 2 VIEWS



COMPLETED DATE/TIME:  6/26/2017 12:10 pm



REASON FOR STUDY:  Increasing left knee pain



COMPARISON:  None.



NUMBER OF VIEWS:  Four views.



TECHNIQUE:  AP, lateral, and both oblique radiographic images acquired of the left knee.



LIMITATIONS:  None.



FINDINGS:  MINERALIZATION: Normal.

BONES: No acute fracture or dislocation.  No worrisome bone lesions.

JOINT: 3 compartment degenerative joint changes are present.  There is narrowing of the medial and la
teral joint compartments with small marginal osteophytes.  There are very prominent posterior patella
r and trochlear osteophytes.

SOFT TISSUES: No soft tissue swelling.  No radio-opaque foreign body.

OTHER: No other significant finding.



IMPRESSION:  3 compartment degenerative joint disease, most prominently in the patellofemoral joint.



TECHNICAL DOCUMENTATION:  JOB ID:  [id number]

 2011 [employer]- All Rights Reserved

## 2017-06-26 NOTE — PDOC TRANSFER SUMMARY
General





- Admit/Disc Date/PCP


Admission Date/Primary Care Provider: 


  06/18/17 23:45





  PRACHI PAULINO MD





Discharge Date: 06/26/17





- Discharge Diagnosis


(1) C. difficile diarrhea


Is this a current diagnosis for this admission?: YesSummary: 


Diarrhe has resolved. Did not tolerate Flagyl due to vomiting. Day #2 oral 

vancomycin therapy








(2) Hypercarbia


Is this a current diagnosis for this admission?: YesSummary: 


Resolved with CPAP. Patient did not have CPAP at discharge from skilled nursing 

facility








(3) Anemia


Is this a current diagnosis for this admission?: YesSummary: 


Stable








(4) Diastolic CHF


Is this a current diagnosis for this admission?: YesSummary: 


Patient is presently euvolemic. Continue current medications








(5) GOSIA (obstructive sleep apnea)


Is this a current diagnosis for this admission?: YesSummary: 


CPAP at HS and prn








(6) History of CVA (cerebrovascular accident)


Is this a current diagnosis for this admission?: YesSummary: 


Left sided residual weakness








(7) Rheumatoid arthritis


Is this a current diagnosis for this admission?: YesSummary: 


Continue current medications








(8) Morbid obesity


Is this a current diagnosis for this admission?: YesSummary: 


Counseled








(9) Encephalopathy acute


Is this a current diagnosis for this admission?: YesSummary: 


Resolved








(10) Acute renal failure


Is this a current diagnosis for this admission?: YesSummary: 


Resolved











- Additional Information


Resuscitation Status: Full Code


Discharge Diet: Diabetic


Discharge Activity: Activity As Tolerated, Balance Activity w/Rest


Home Medications: 








Albuterol Sulfate [Ventolin HFA MDI 18 GM] 2 puff IH Q6HP PRN 06/19/17 


Atorvastatin Calcium [Lipitor 40 mg Tablet] 40 mg PO QHS 06/19/17 


Celecoxib [Celebrex 200 mg Capsule] 200 mg PO Q12 06/19/17 


Cetirizine HCl [Zyrtec 10 mg Tablet] 10 mg PO DAILY 06/19/17 


Cevimeline HCl [Evoxac] 30 mg PO TIDP PRN 06/19/17 


Clopidogrel Bisulfate [Plavix 75 mg Tablet] 75 mg PO DAILY 06/19/17 


Docusate Sodium [Dok] 100 mg PO BID 06/19/17 


Duloxetine HCl [Cymbalta] 60 mg PO Q12 06/19/17 


Ergocalciferol (Vitamin D2) [Drisdol 50,000 unit (1.25MG) Capsule] 50,000 unit 

PO [id number] 06/19/17 


Famotidine [Pepcid 40 mg Tablet] 40 mg PO QHS 06/19/17 


Folic Acid [Folvite 1 mg Tablet] 1 mg PO DAILY 06/19/17 


Furosemide [Lasix 80 mg Tablet] 80 mg PO DAILY 06/19/17 


Lisinopril [Prinivil 5 mg Tablet] 5 mg PO DAILY 06/19/17 


Metformin HCl [Glucophage] 1,000 mg PO BIDACBS 06/19/17 


Methotrexate Sodium [Methotrexate] 15 mg PO TH@1000 06/19/17 


Metoprolol Succinate [Toprol  mg Tablet] 100 mg PO Q12 06/19/17 


Montelukast Sodium [Singulair 10 mg Tablet] 10 mg PO QHS 06/19/17 


Potassium Chloride [K-Tab ER] 20 meq PO DAILY 06/19/17 


Pregabalin [Lyrica] 150 mg PO Q12 06/19/17 


Prenatal Vit/Iron Fumarate/FA [Prenatal Tablet] 1 tab PO DAILY 06/19/17 


Spironolactone [Aldactone 25 mg Tablet] 25 mg PO DAILY 06/19/17 


Sulfasalazine [Sulfazine] 500 mg PO Q8 06/19/17 


Tizanidine HCl [Zanaflex 4 mg Tablet] 4 mg PO Q8HP PRN MDD 3 CAPSULES 06/19/17 


Verapamil HCl [Calan 120 mg Tablet] 120 mg PO DAILY 06/19/17 


Acetaminophen [Tylenol 325 mg Tablet] 650 mg PO Q8HP PRN  tablet 06/26/17 


Aspirin [Ecotrin 81 mg EC Tablet] 81 mg PO DAILY #30 tabec 06/26/17 


Insulin Lispro [Humalog Insulin (Lispro) 100 unit/mL] 0 - 12 unit SUBCUT ACHSP 

PRN  unit 06/26/17 


Polyethylene Glycol 3350 [Miralax Powder 17 gm/Packet] 17 gm PO DAILY PRN  

powd.pack 06/26/17 


Vancomycin HCl 250 mg PO Q6H #36 capsule 06/26/17 


Zolpidem Tartrate [Ambien] 10 mg PO HSP PRN #5 tablet 06/26/17 











History of Present Illness


Admission Date/PCP: 


  06/18/17 23:45





  PRACHI PAULINO MD





Patient complains of: Altered mental status and weakness


History of Present Illness: 


KALIE DANIEL is a 56 year old morbidly obese -American female with 

multiple underlying chronic problems, including fibromyalgia, methotrexate- and 

steroid-dependent rheumatoid arthritis, and chronic pain syndrome, along with 

chronic opiate use, who presents to the emergency room for evaluation of above 

complaint.





Patient has been discussed with emergency room physician who evaluated the 

patient.  Patient is fairly somnolent, and while she does awaken easily and 

answers occasional basic questions reasonably appropriately, she is able to 

provide no history whatsoever in terms of acute events, and little history 

related to chronic events, review of systems, personal habits, family history, 

etc. 2 daughters and a son-in-law are present and are somewhat helpful and 

informative, although they had not seen her since Tuesday of this week until 

the 18th..  Old inpatient records are reviewed. Above individuals present at 

patient's side with her approval.





Hospitalized on our service the 14th through the 24th of last month with final 

diagnoses including systemic inflammatory response syndrome secondary to viral 

gastroenteritis along with polypharmacy, among other diagnoses.  History and 

physical and discharge summary have been reviewed.





transferred from our facility to [facility] for rehab, and just was 

discharged from there back home this past Tuesday.





Granddaughter was helping her get out of a chair into the bathroom when patient 

fell on her left side approximately noon on the 18th.  Complaining of bilateral 

pelvic pain with little strength in her legs, which according to her daughter, 

has been the case for the last 3 or 4 months.  Was noted to be hypotensive and 

poorly responsive upon arrival in the emergency room, with improvement in both 

with a single 0.2 mg of Narcan.  According to the emergency room physician, she 

takes 100 mg of morphine orally twice a day.





Was noted by the emergency room physician to have minimal function in her left 

lower extremity and decreased but improving function in her left upper 

extremity.  Prior stroke in 1993 which affected the left side, but from which 

she had completely recovered, according to daughter.





no fever or chills, chest or abdominal pain.  However, patient does state that 

she has had some nausea vomiting and diarrhea off and on since arriving home.


 


 


Laboratory results are listed in [facility] and are reviewed. 


 


 


X-ray summary results are listed below, with full report(s) reviewed. .


 


 


 


EKG reviewed.


 


 


 


Social history/personal habits: Single.  Has children.  Disabled due to 

multiple health problems.  No use of alcohol tobacco or illicit drugs.


 


 


 


Allergies/adverse reactions are listed in [email or web address] and are reviewed. 


 


 


Home medications initially autopopulated into [employer] may not accurately 

reflect patient's true medications, dosages, and/or frequencies. Pharmacy tech 

to reconcile  medications.


 


Unfortunately, patient cannot provide any information related to medications/

dosages/frequencies.  


 





REVIEW OF SYSTEMS: See history and present illness.  No further information 

available this point in time.


 





 


 


PHYSICAL EXAMINATION:


 


5 feet 6 inches tall.  149 kg.  BMI 53 kg/m.  Blood pressure 158/86.  Pulse 97 

and regular.  100% saturation on room air.  Respirations are 12 and unlabored.  

Temperature 97.7.





Morbidly obese -American female appearing approximately her stated age.  

Somnolent, but does awaken somewhat when spoken to in a normal volume voice.  

Does provide occasionally reasonable answers to basic questions concerning 

chronic aspects of her health, but is not able to provide any information 

related to acute events that led her to come to the emergency room.  

Maintaining her airway well.





Female emergency room nursing technician Rosa is present.





2 daughters and a son-in-law are present.  Patient approves.


 


Skin is warm and dry.  No grossly obvious evidence of rash in areas of skin 

examined.  No subcutaneous nodules palpated.


 


ENT: Hearing grossly normal to normal conversation.  Tongue midline on 

protrusion pink and slightly tacky.


 


Eyes: No scleral icterus.  Pupils equal and reactive to light at 4 mm.  Pink 

conjunctivae.  No raccoon eyes.


 


Neck is supple and nontender to gentle active range of motion and palpation.  

Midline trachea.  No palpable thyroid nodule mass enlargement or tenderness.


 


Lymphatic: No palpable cervical or clavicular nodes.


 


Neck and lymphatic exams limited by patient body habitus.


 


Psychiatric: Difficult to adequately evaluate due to her current status.  See 

above.


 


Lungs: Auscultation reveals clear and equal breath sounds bilaterally.  No use 

of accessory respiratory muscles.


 


Cardiovascular: Heart regular rate and rhythm, without gallop murmur or rub.  

No carotid or abdominal aortic bruits. No ankle or pedal edema. Faintly 

palpable dorsalis pedis pulses.


 


Abdomen:soft prominently obese nontender with positive bowel sounds.  Unable to 

adequately evaluate abdomen for masses or organomegaly due to body habitus. 


 


Extremities: Feet are warm and dry.  No calf tenderness to compression.  No 

grossly obvious visual evidence of calf swelling.  Gentle manipulation of lower 

extremities fails to reveal any obvious evidence of injury or instability to 

knees hips or ankles although range of motion is quite limited due to 

combination of her weakness and body habitus.


 


Neurologic: Cranial Nerves somewhat difficult to evaluate due to patient's 

somnolence and perhaps some difficulty in understanding instructions.  Mild 

left facial droop.  Tongue midline on protrusion.  No nystagmus.  Does not 

perform trapezius raise.  Does not attempt extraocular movements.  Light touch 

cannot be adequately evaluated due to her mental status.  Motor function of 

major muscle groups right upper extremity 5/5; 4/5 right lower extremity.  

Handgrip on the left 4/5 versus 5/5 on the right.  Biceps and triceps function 

on the left 4/5, versus 5/5 on the right.  Left lower extremity motor function 3

/5.  Patellar reflexes absent.  Absent Babinski.  No ankle clonus.


 











Hospital Course


Hospital Course: 


Patient was admitted to [facility] on telemetry. She was placed on BiPAP and had 

resolution of her hypercarbia and improved mentation back to baseline. MRI 

showed no new infarcts. Carotid dopplers were obtained which showed no 

significant stenosis. PT was consulted. Patient required 2 assistants to be 

OOB. PT recommended short term rehab. Discharge planning was consulted. Patient 

had a bed offer from [facility]. Patient developed diarrhea over 

the weekend, which tested positive for c diff. She has had no recent antibiotic 

therapy. She was placed on Flagyl which after 2 doses caused vomiting . This 

was discontinued. She was started on oral vancomyin which she tolerated. Her 

diarrhea has stopped. She has been compliant with CPAP at night.





Physical Exam


Vital Signs: 


 











Temp Pulse Resp BP Pulse Ox


 


 98.4 F   92   19   106/69   98 


 


 06/26/17 07:51  06/26/17 07:51  06/26/17 07:51  06/26/17 07:51  06/26/17 07:51








 Intake & Output











 06/25/17 06/26/17 06/27/17





 06:59 06:59 06:59


 


Intake Total 1272 958 


 


Balance 1272 958 


 


Weight 139 kg 139.2 kg 











General appearance: PRESENT: no acute distress, morbidly obese, well-developed, 

well-nourished


Head exam: PRESENT: atraumatic, normocephalic


Eye exam: PRESENT: conjunctiva pink, EOMI, PERRLA.  ABSENT: scleral icterus


Ear exam: PRESENT: normal external ear exam


Mouth exam: PRESENT: moist, tongue midline


Neck exam: ABSENT: carotid bruit, JVD, lymphadenopathy, thyromegaly


Respiratory exam: PRESENT: clear to auscultation kaley.  ABSENT: rales, rhonchi, 

wheezes


Cardiovascular exam: PRESENT: RRR, +S1, +S2.  ABSENT: diastolic murmur, rubs, 

systolic murmur


Pulses: PRESENT: normal carotid pulses, normal radial pulses


GI/Abdominal exam: PRESENT: normal bowel sounds, soft.  ABSENT: distended, 

guarding, mass, organolmegaly, rebound, tenderness


Rectal exam: PRESENT: deferred


Extremities exam: PRESENT: full ROM, pedal edema - left lower extremity, +1 

edema


Musculoskeletal exam: PRESENT: ambulatory, full ROM


Neurological exam: PRESENT: alert, awake, oriented to person, oriented to place

, oriented to time, oriented to situation, CN II-XII grossly intact.  ABSENT: 

motor sensory deficit


Psychiatric exam: PRESENT: appropriate affect, normal mood.  ABSENT: homicidal 

ideation, suicidal ideation


Skin exam: PRESENT: dry, intact, warm.  ABSENT: cyanosis, rash





Results


Laboratory Results: 


 





 06/24/17 22:00 





 06/26/17 04:29 





 











  06/26/17





  04:29


 


Sodium  140.4


 


Potassium  4.2


 


Chloride  101


 


Carbon Dioxide  29


 


Anion Gap  10


 


BUN  19


 


Creatinine  1.17


 


Est GFR ( Amer)  58 L


 


Est GFR (Non-Af Amer)  48 L


 


Glucose  150 H


 


Calcium  9.2








 











  06/19/17 06/24/17 06/24/17





  01:24 22:00 22:15


 


Creatine Kinase   353 H 


 


CK-MB (CK-2)    < 0.22


 


Troponin I  < 0.012   < 0.012














  06/25/17 06/25/17 06/25/17





  04:16 04:16 10:50


 


Creatine Kinase  261 H   192 H


 


CK-MB (CK-2)   < 0.22 


 


Troponin I   < 0.012 














  06/25/17





  10:50


 


Creatine Kinase 


 


CK-MB (CK-2)  < 0.22


 


Troponin I  < 0.012











Impressions: 


 





Head CT  06/18/17 00:00


IMPRESSION:  No acute intracranial abnormality.


 








Chest X-Ray  06/18/17 16:39


IMPRESSION:  Small lung volumes with vascular crowding.  No focal infiltrates.


 








Hip X-Ray  06/18/17 16:40


IMPRESSION:  NEGATIVE STUDY OF THE LEFT HIP AND PELVIS. NO RADIOGRAPHIC 

EVIDENCE OF ACUTE INJURY.


 








Head MRI  06/19/17 00:32


IMPRESSION:  Age-appropriate MRI OF THE BRAIN WITHOUT AND WITH INTRAVENOUS 

GADOLINIUM CONTRAST.


 








Brain MRI with MRA  06/19/17 00:33


IMPRESSION:  NORMAL MRA OF THE [place] OF SENIOR.


 








Carotid Doppler Study  06/19/17 00:33


IMPRESSION:  LIMITED STUDY.  VELOCITY MEASUREMENTS SUGGEST 50- 69% STENOSIS OF 

THE LEFT INTERNAL CAROTID ARTERY BUT NO SIGNIFICANT PLAQUE DEMONSTRATED ON 

ULTRASOUND IMAGES.  THIS COULD BE DUE TO TORTUOSITY OF THE VESSEL.  NO 

HEMODYNAMICALLY SIGNIFICANT STENOSIS ON EITHER SIDE.


 














Transfer Plan





- Disposition


Transfer Plan: 


Transfer to [facility]





- Time Spent with Patient


Time spent with patient: Less than 30 Minutes





Qualifiers


**PATEINT BEING DISCHARGED WITH ANY OF THE FOLLOWING DIAGNOSIS?: No





Plan


Time Spent: Less than 30 Minutes

## 2017-07-14 ENCOUNTER — HOSPITAL ENCOUNTER (INPATIENT)
Dept: HOSPITAL 62 - ER | Age: 57
LOS: 5 days | Discharge: HOME HEALTH | DRG: 91 | End: 2017-07-19
Attending: FAMILY MEDICINE | Admitting: FAMILY MEDICINE
Payer: MEDICARE

## 2017-07-14 DIAGNOSIS — I25.2: ICD-10-CM

## 2017-07-14 DIAGNOSIS — Z90.710: ICD-10-CM

## 2017-07-14 DIAGNOSIS — Z87.891: ICD-10-CM

## 2017-07-14 DIAGNOSIS — T40.605A: ICD-10-CM

## 2017-07-14 DIAGNOSIS — Z79.899: ICD-10-CM

## 2017-07-14 DIAGNOSIS — F41.9: ICD-10-CM

## 2017-07-14 DIAGNOSIS — I25.10: ICD-10-CM

## 2017-07-14 DIAGNOSIS — F60.9: ICD-10-CM

## 2017-07-14 DIAGNOSIS — I48.91: ICD-10-CM

## 2017-07-14 DIAGNOSIS — J44.9: ICD-10-CM

## 2017-07-14 DIAGNOSIS — J45.909: ICD-10-CM

## 2017-07-14 DIAGNOSIS — I11.0: ICD-10-CM

## 2017-07-14 DIAGNOSIS — E87.5: ICD-10-CM

## 2017-07-14 DIAGNOSIS — Z86.711: ICD-10-CM

## 2017-07-14 DIAGNOSIS — F32.9: ICD-10-CM

## 2017-07-14 DIAGNOSIS — N17.9: ICD-10-CM

## 2017-07-14 DIAGNOSIS — Z88.6: ICD-10-CM

## 2017-07-14 DIAGNOSIS — J96.02: ICD-10-CM

## 2017-07-14 DIAGNOSIS — G89.4: ICD-10-CM

## 2017-07-14 DIAGNOSIS — Z91.19: ICD-10-CM

## 2017-07-14 DIAGNOSIS — E66.2: ICD-10-CM

## 2017-07-14 DIAGNOSIS — T50.2X5A: ICD-10-CM

## 2017-07-14 DIAGNOSIS — K50.90: ICD-10-CM

## 2017-07-14 DIAGNOSIS — G92: Primary | ICD-10-CM

## 2017-07-14 DIAGNOSIS — I44.0: ICD-10-CM

## 2017-07-14 DIAGNOSIS — M06.9: ICD-10-CM

## 2017-07-14 DIAGNOSIS — D64.9: ICD-10-CM

## 2017-07-14 DIAGNOSIS — K44.9: ICD-10-CM

## 2017-07-14 DIAGNOSIS — E11.51: ICD-10-CM

## 2017-07-14 DIAGNOSIS — R01.1: ICD-10-CM

## 2017-07-14 DIAGNOSIS — T50.901A: ICD-10-CM

## 2017-07-14 DIAGNOSIS — Z83.3: ICD-10-CM

## 2017-07-14 DIAGNOSIS — E78.5: ICD-10-CM

## 2017-07-14 DIAGNOSIS — Z82.49: ICD-10-CM

## 2017-07-14 DIAGNOSIS — I50.32: ICD-10-CM

## 2017-07-14 DIAGNOSIS — Z79.891: ICD-10-CM

## 2017-07-14 DIAGNOSIS — M79.7: ICD-10-CM

## 2017-07-14 LAB
ALBUMIN SERPL-MCNC: 4.1 G/DL (ref 3.5–5)
ALP SERPL-CCNC: 69 U/L (ref 38–126)
ALT SERPL-CCNC: 24 U/L (ref 9–52)
ANION GAP SERPL CALC-SCNC: 14 MMOL/L (ref 5–19)
APPEARANCE UR: (no result)
AST SERPL-CCNC: 15 U/L (ref 14–36)
BASE EXCESS BLDA CALC-SCNC: -4.9 MMOL/L
BASE EXCESS BLDV CALC-SCNC: -7.5 MMOL/L
BASOPHILS # BLD AUTO: 0 10^3/UL (ref 0–0.2)
BASOPHILS NFR BLD AUTO: 0.4 % (ref 0–2)
BILIRUB SERPL-MCNC: 0.4 MG/DL (ref 0.2–1.3)
BILIRUB UR QL STRIP: NEGATIVE
BUN SERPL-MCNC: 39 MG/DL (ref 7–20)
CALCIUM: 9.3 MG/DL (ref 8.4–10.2)
CHLORIDE SERPL-SCNC: 106 MMOL/L (ref 98–107)
CO2 SERPL-SCNC: 21 MMOL/L (ref 22–30)
CREAT SERPL-MCNC: 2.94 MG/DL (ref 0.52–1.25)
EOSINOPHIL # BLD AUTO: 0 10^3/UL (ref 0–0.6)
EOSINOPHIL NFR BLD AUTO: 0.2 % (ref 0–6)
ERYTHROCYTE [DISTWIDTH] IN BLOOD BY AUTOMATED COUNT: 16.7 % (ref 11.5–14)
FERRITIN SERPL-MCNC: 97.3 NG/ML (ref 11.1–264)
FOLATE SERPL-MCNC: > 20 NG/ML (ref 2.76–?)
GLUCOSE SERPL-MCNC: 139 MG/DL (ref 75–110)
GLUCOSE UR STRIP-MCNC: 150 MG/DL
HCO3 BLDV-SCNC: 22.7 MMOL/L (ref 20–32)
HCT VFR BLD CALC: 28.4 % (ref 36–47)
HGB BLD-MCNC: 8.8 G/DL (ref 12–15.5)
HGB HCT DIFFERENCE: -2
KETONES UR STRIP-MCNC: NEGATIVE MG/DL
LYMPHOCYTES # BLD AUTO: 1.2 10^3/UL (ref 0.5–4.7)
LYMPHOCYTES NFR BLD AUTO: 12.6 % (ref 13–45)
MCH RBC QN AUTO: 23.9 PG (ref 27–33.4)
MCHC RBC AUTO-ENTMCNC: 30.9 G/DL (ref 32–36)
MCV RBC AUTO: 77 FL (ref 80–97)
MONOCYTES # BLD AUTO: 0.7 10^3/UL (ref 0.1–1.4)
MONOCYTES NFR BLD AUTO: 6.9 % (ref 3–13)
NEUTROPHILS # BLD AUTO: 7.6 10^3/UL (ref 1.7–8.2)
NEUTS SEG NFR BLD AUTO: 79.9 % (ref 42–78)
NITRITE UR QL STRIP: NEGATIVE
PCO2 BLDV: 67.6 MMHG (ref 35–63)
PH BLDV: 7.14 [PH] (ref 7.3–7.42)
PH UR STRIP: 5 [PH] (ref 5–9)
POTASSIUM SERPL-SCNC: 6 MMOL/L (ref 3.6–5)
PROT SERPL-MCNC: 7.6 G/DL (ref 6.3–8.2)
PROT UR STRIP-MCNC: NEGATIVE MG/DL
RBC # BLD AUTO: 3.67 10^6/UL (ref 3.72–5.28)
SAO2 % BLDA: 95.3 % (ref 94–98)
SODIUM SERPL-SCNC: 140.6 MMOL/L (ref 137–145)
SP GR UR STRIP: 1.01
UROBILINOGEN UR-MCNC: NEGATIVE MG/DL (ref ?–2)
VIT B12 SERPL-MCNC: 380 PG/ML (ref 239–931)
WBC # BLD AUTO: 9.6 10^3/UL (ref 4–10.5)

## 2017-07-14 PROCEDURE — 82962 GLUCOSE BLOOD TEST: CPT

## 2017-07-14 PROCEDURE — 94640 AIRWAY INHALATION TREATMENT: CPT

## 2017-07-14 PROCEDURE — 36415 COLL VENOUS BLD VENIPUNCTURE: CPT

## 2017-07-14 PROCEDURE — 51702 INSERT TEMP BLADDER CATH: CPT

## 2017-07-14 PROCEDURE — 83550 IRON BINDING TEST: CPT

## 2017-07-14 PROCEDURE — 83540 ASSAY OF IRON: CPT

## 2017-07-14 PROCEDURE — 81001 URINALYSIS AUTO W/SCOPE: CPT

## 2017-07-14 PROCEDURE — 85045 AUTOMATED RETICULOCYTE COUNT: CPT

## 2017-07-14 PROCEDURE — 96361 HYDRATE IV INFUSION ADD-ON: CPT

## 2017-07-14 PROCEDURE — 93010 ELECTROCARDIOGRAM REPORT: CPT

## 2017-07-14 PROCEDURE — 82803 BLOOD GASES ANY COMBINATION: CPT

## 2017-07-14 PROCEDURE — 85025 COMPLETE CBC W/AUTO DIFF WBC: CPT

## 2017-07-14 PROCEDURE — 96375 TX/PRO/DX INJ NEW DRUG ADDON: CPT

## 2017-07-14 PROCEDURE — 94660 CPAP INITIATION&MGMT: CPT

## 2017-07-14 PROCEDURE — 71010: CPT

## 2017-07-14 PROCEDURE — 96365 THER/PROPH/DIAG IV INF INIT: CPT

## 2017-07-14 PROCEDURE — 82728 ASSAY OF FERRITIN: CPT

## 2017-07-14 PROCEDURE — 5A09457 ASSISTANCE WITH RESPIRATORY VENTILATION, 24-96 CONSECUTIVE HOURS, CONTINUOUS POSITIVE AIRWAY PRESSURE: ICD-10-PCS

## 2017-07-14 PROCEDURE — 83735 ASSAY OF MAGNESIUM: CPT

## 2017-07-14 PROCEDURE — 80053 COMPREHEN METABOLIC PANEL: CPT

## 2017-07-14 PROCEDURE — 82746 ASSAY OF FOLIC ACID SERUM: CPT

## 2017-07-14 PROCEDURE — 36600 WITHDRAWAL OF ARTERIAL BLOOD: CPT

## 2017-07-14 PROCEDURE — 99291 CRITICAL CARE FIRST HOUR: CPT

## 2017-07-14 PROCEDURE — 84466 ASSAY OF TRANSFERRIN: CPT

## 2017-07-14 PROCEDURE — 82607 VITAMIN B-12: CPT

## 2017-07-14 PROCEDURE — 83605 ASSAY OF LACTIC ACID: CPT

## 2017-07-14 PROCEDURE — 80048 BASIC METABOLIC PNL TOTAL CA: CPT

## 2017-07-14 PROCEDURE — 93005 ELECTROCARDIOGRAM TRACING: CPT

## 2017-07-14 RX ADMIN — HEPARIN SODIUM SCH UNIT: 5000 INJECTION, SOLUTION INTRAVENOUS; SUBCUTANEOUS at 22:26

## 2017-07-14 NOTE — RADIOLOGY REPORT (SQ)
EXAM DESCRIPTION:  CHEST SINGLE VIEW



COMPLETED DATE/TIME:  7/14/2017 5:52 pm



REASON FOR STUDY:  SOB



COMPARISON:  6/18/2017.



EXAM PARAMETERS:  NUMBER OF VIEWS: One view.

TECHNIQUE: Single frontal radiographic view of the chest acquired.

RADIATION DOSE: NA

LIMITATIONS: None.



FINDINGS:  LUNGS AND PLEURA: No opacities, masses or pneumothorax. No pleural effusion.

MEDIASTINUM AND HILAR STRUCTURES: No masses.  Contour normal.

HEART AND VASCULAR STRUCTURES: Heart upper limits of normal in size.  Normal vasculature.

BONES: No acute findings.

HARDWARE: None in the chest.

OTHER: No other significant finding.



IMPRESSION:  NO ACUTE RADIOGRAPHIC FINDING IN THE CHEST.



TECHNICAL DOCUMENTATION:  JOB ID:  [id number]

## 2017-07-14 NOTE — ER DOCUMENT REPORT
ED General





- General


Chief Complaint: Other


Stated Complaint: ALTERED MENTAL STATUS


Time Seen by Provider: 17 14:34


Notes: 


The patient is a 56-year-old female, past medical history fibromyalgia, 

rheumatoid arthritis (on Morphine 100 mg bid), hypertension, COPD, GOSIA (on home 

CPAP), presents by EMS after she was found tired after she took her 100 mg 

morphine earlier today at 13:00 by her home health nurse.  Patient was 

discharged from [facility] 3 days ago after an extended stay at 

[facility] for polypharmacy, possible TIA/CVA and C. diff 

infection.  Patient is somnolent, but will wake up to voice and interact.  

Patient has no complaints at this time.


TRAVEL OUTSIDE OF THE [place] IN LAST 30 DAYS: No





- Related Data


Allergies/Adverse Reactions: 


 





ibuprofen [Ibuprofen] Allergy (Severe, Verified 17 00:36)


 Chest pain











Past Medical History





- General


Information source: Patient, Emergency Med Personnel





- Social History


Smoking Status: Former Smoker


Family History: CAD, DM, Hypertension





- Past Medical History


Cardiac Medical History: Reports: Hx Hypercholesterolemia, Hx Hypertension, Hx 

Heart Murmur -  systolic murmur


   Denies: Hx Atrial Fibrillation, Hx Congestive Heart Failure, Hx Coronary 

Artery Disease, Hx Heart Attack, Hx Peripheral Vascular Disease, Hx Pulmonary 

Embolism


Pulmonary Medical History: Reports: Hx Asthma, Hx Bronchitis, Hx Pneumonia, Hx 

Sleep Apnea


   Denies: Hx COPD, Hx Respiratory Failure, Hx Tuberculosis


Neurological Medical History: Reports: Hx Cerebrovascular Accident - .  

Denies: Hx Seizures


Endocrine Medical History: Reports: Hx Diabetes Mellitus Type 2.  Denies: Hx 

Graves' Disease, Hx Hyperthyroidism, Hx Hypothyroidism


Malignancy Medical History: Denies: Hx Leukemia, Hx Lung Cancer


GI Medical History: Reports: Hx Crohn's Disease, Hx Hiatal Hernia.  Denies: Hx 

Gastroesophageal Reflux Disease, Hx Irritable Bowel, Hx Liver Failure, Hx Ulcer


Musculoskeltal Medical History: Reports Hx Arthritis, Reports Hx Fibromyalgia, 

Denies Hx Multiple Sclerosis, Denies Hx Muscular Dystrophy


Psychiatric Medical History: Reports: Hx Depression, Hx Personality Disorder


   Denies: Hx Bipolar Disorder, Hx Dementia, Hx Post Traumatic Stress Disorder, 

Hx Schizophrenia


Traumatic Medical History: Denies: Hx Fractures


Infectious Medical History: Denies: Hx HIV


Past Surgical History: Reports: Hx  Section, Hx Hysterectomy.  Denies: 

Hx Appendectomy, Hx Bowel Surgery, Hx Cholecystectomy, Hx Colostomy, Hx 

Coronary Artery Bypass Graft, Hx Gastric Bypass Surgery, Hx Herniorrhaphy, Hx 

Mastectomy, Hx Pacemaker, Hx Tonsillectomy, Hx Tubal Ligation





- Immunizations


Immunizations up to date: Yes


Hx Diphtheria, Pertussis, Tetanus Vaccination: Yes


Hx Pneumococcal Vaccination: 08





Review of Systems





- Review of Systems


Notes: 


REVIEW OF SYSTEMS:


CONSTITUTIONAL: -fevers, -chills


EENT: -eye pain, -difficulty swallowing, -nasal congestion


CARDIOVASCULAR:-chest pain, -syncope.


RESPIRATORY: -cough, -SOB


GASTROINTESTINAL: -abdominal pain, - nausea, -vomiting, -diarrhea


GENITOURINARY: -dysuria, -hematuria


MUSCULOSKELETAL: -back pain, -neck pain


SKIN: -rash or skin lesions.


HEMATOLOGIC: -easy bruising or bleeding.


LYMPHATIC: -swollen, enlarged glands.


NEUROLOGICAL: -altered mental status or loss of consciousness, -headache, -

neurologic symptoms


PSYCHIATRIC: -anxiety, -depression.


ALL OTHER SYSTEMS REVIEWED AND NEGATIVE.





Physical Exam





- Vital signs


Vitals: 


 











Resp BP Pulse Ox


 


 10 L  98/74 L  93 


 


 17 14:41  17 14:41  17 14:41














- Notes


Notes: 


PHYSICAL EXAMINATION:





GENERAL: In no acute distress. Somnolent but will wake up to voice and interact.





HEAD: Atraumatic, normocephalic.





EYES: Pupils pinpoint, extraocular movements intact, sclera anicteric, 

conjunctiva are normal.





ENT: nares patent, oropharynx clear without exudates.  Moist mucous membranes.





NECK: Normal range of motion, supple without lymphadenopathy





LUNGS: Bradypneic. Breath sounds clear to auscultation bilaterally and equal.  

No wheezes rales or rhonchi.





HEART: Regular rate and rhythm without murmurs





ABDOMEN: Soft, nontender, normoactive bowel sounds.  No guarding, no rebound.  

No masses appreciated.





EXTREMITIES: Normal range of motion, no pitting or edema.  No cyanosis.





NEUROLOGICAL: Cranial nerves grossly intact.  Normal speech. Normal sensory and 

motor exams.





SKIN: Warm, Dry, normal turgor, no rashes or lesions noted.





Course





- Re-evaluation


Re-evalutation: 


Patient is somnolent and has evidence of acute hypercarbic respiratory 

acidosis. Narcan provided with some movement in her mental status.  Patient 

placed on BiPAP.  Patient also has evidence of acute renal failure with 

hyperkalemia, but no EKG changes.  Patient provided with 2 L of IV fluids, 

calcium, insulin, dextrose and albuterol.  Patient says that her C. difficile 

has cleared up, but she still appears to be volume depleted.  Patient requires 

inpatient admission for further evaluation and treatment.  Her primary care 

physician is Kayley Childs.





17 17:53 Spoke to Dr. Michelle and will admit patient to ICU overnight for 

close monitoring and treatment.





- Vital Signs


Vital signs: 


 











Temp Pulse Resp BP Pulse Ox


 


 97.9 F      13   117/69   94 


 


 17 14:51     17 16:00  17 16:00  17 16:00














- Laboratory


Result Diagrams: 


 17 15:10





 17 15:10


Laboratory results interpreted by me: 


 











  17





  15:10 15:10 15:10


 


RBC  3.67 L  


 


Hgb  8.8 L  


 


Hct  28.4 L  


 


MCV  77 L  


 


MCH  23.9 L  


 


MCHC  30.9 L  


 


RDW  16.7 H  


 


Seg Neutrophils %  79.9 H  


 


Lymphocytes %  12.6 L  


 


VBG pH    7.14 L*


 


VBG pCO2    67.6 H*


 


Potassium   6.0 H* 


 


Carbon Dioxide   21 L 


 


BUN   39 H 


 


Creatinine   2.94 H 


 


Est GFR ( Amer)   20 L 


 


Est GFR (Non-Af Amer)   17 L 


 


Glucose   139 H 














- Diagnostic Test


Radiology reviewed: Image reviewed, Reports reviewed


Radiology results interpreted by me: 


CXR: NAD





- EKG Interpretation by Me


EKG shows normal: Sinus rhythm, Axis, Intervals, QRS Complexes, ST-T Waves


Rate: Normal





Critical Care Note





- Critical Care Note


Total time excluding time spent on procedures (mins): 35





Discharge





- Discharge


Clinical Impression: 


 ANNY (acute kidney injury), Acute respiratory failure with hypercapnia, 

Hyperkalemia





Condition: Serious


Disposition: ADMITTED AS INPATIENT


Admitting Provider: Hospitalist Soo Michelle


Unit Admitted: ICU


Referrals: 


KAYLEY CHILDS PA-C [Primary Care Provider] - Follow up as needed

## 2017-07-14 NOTE — EKG REPORT
SEVERITY:- ABNORMAL ECG -

SINUS RHYTHM

FIRST DEGREE AV BLOCK

:

Confirmed by: Garett Johnston MD 14-Jul-2017 20:15:21

## 2017-07-14 NOTE — PDOC H&P
History of Present Illness


Admission Date/PCP: 


  17 18:08





  KAYLEY CHILDS PA-C





Patient complains of: Altered mental status


History of Present Illness: 


The patient is a 56-year-old female, past medical history fibromyalgia, 

rheumatoid arthritis (on Morphine 100 mg bid), hypertension, COPD, GOSIA (on home 

CPAP), presents by EMS after she was found tired after she took her 100 mg 

morphine earlier today at 13:00 by her home health nurse.  Patient was 

discharged from [facility] 3 days ago after an extended stay at 

[facility] for polypharmacy, possible TIA/CVA and C. diff 

infection.  Patient is somnolent, but will wake up to voice and interact.  

Patient has no complaints at this time.





Medications have not been verified.





Past Medical History


Cardiac Medical History: Reports: Hyperlipidema, Hypertension, Heart Murmur -  systolic murmur


   Denies: Atrial Fibrillation, Congestive Heart Failure, Coronary Artery 

Disease, Myocardial Infarction, Peripheral Vascular Disease, Pulmonary Embolism


Pulmonary Medical History: Reports: Asthma, Bronchitis, Pneumonia, Sleep Apnea


   Denies: Chronic Obstructive Pulmonary Disease (COPD), Respiratory Failure, 

Tuberculosis


Neurological Medical History: 


   Denies: Seizures


Endocrine Medical History: Reports: Diabetes Mellitus Type 2


   Denies: Hyperthyroidism, Hypothyroidism


Malignancy Medical History: 


   Denies: Leukemia, Lung Cancer


GI Medical History: Reports: Crohn's Disease, Hiatal Hernia


   Denies: Gastroesophageal Reflux Disease


Musculoskeltal Medical History: Reports: Arthritis, Fibromyalgia


Psychiatric Medical History: Reports: Depression, Personality Disorder


   Denies: Bipolar Disorder, Dementia, Post Traumatic Stress Disorder


Hematology: Reports: Anemia


   Denies: Hemophilia, Sickle Cell Disease


Infectious Medical History: 


   Denies: HIV





Past Surgical History


Past Surgical History: Reports:  Section, Hysterectomy


   Denies: Amputation, Appendectomy, Cholecystectomy, Colostomy, Coronary 

Artery Bypass Graft, Gastric Bypass Surgery, Herniorrhaphy, Mastectomy, 

Pacemaker, Tonsillectomy, Tubal Ligation





Social History


Information Source: Patient, Emergency Med Personnel


Lives with: Family


Smoking Status: Former Smoker


Frequency of Alcohol Use: None


Hx Recreational Drug Use: No


Drugs: None


Hx Prescription Drug Abuse: Yes - 2016





- Advance Directive


Resuscitation Status: Full Code





Family History


Family History: CAD, DM, Hypertension


Parental Family History Reviewed: Yes


Children Family History Reviewed: Yes


Sibling(s) Family History Reviewed.: Yes





Medication/Allergy


Allergies/Adverse Reactions: 


 





ibuprofen [Ibuprofen] Allergy (Severe, Verified 17 00:36)


 Chest pain











Review of Systems


ROS unobtainable: Due to mental status





Physical Exam


Vital Signs: 


 











Temp Pulse Resp BP Pulse Ox


 


 97.9 F      10 L  129/77 H  100 


 


 17 14:51     17 18:01  17 18:01  17 18:01








PHYSICAL EXAM:





GENERAL:             


HEENT: Normocephalic, no scleral icterus, conjunctiva clear, EOEM intact, PERRLA

, moist mucous membranes            


NECK: trachea midline, no thyromegally            


RESPIRATORY: Clear to auscultation, no wheezes/rhonchi


CARDIAC: Regular rate and rhythm, no murmur/aung/rub         


ABDOMEN: Soft, no distension, no tenderness, no guarding, normal bowel sounds, 

negative Mcmullen sign         


RECTAL: deferred


: deferred


EXTREMITIES: No edema, cyanosis, clubbing


MUSCULOSKELETAL: No joint swelling or deformity


VASCULAR: normal peripheral pulses


NEUROLOGIC: Somnolent but arousable, oriented to person only, normal speech, 

cranial nerves grossly intact, 5/5 strength in all extremities, tactile 

sensation intact in all extremities


SKIN: No rash, no wounds, no worrisome skin lesions


 





Results


Laboratory Results: 


 Labs- All tests 24 hr











  17





  15:10 15:10 15:10


 


WBC  9.6  


 


RBC  3.67 L  


 


Hgb  8.8 L  


 


Hct  28.4 L  


 


MCV  77 L  


 


MCH  23.9 L  


 


MCHC  30.9 L  


 


RDW  16.7 H  


 


Plt Count  216  


 


Seg Neutrophils %  79.9 H  


 


Lymphocytes %  12.6 L  


 


Monocytes %  6.9  


 


Eosinophils %  0.2  


 


Basophils %  0.4  


 


Absolute Neutrophils  7.6  


 


Absolute Lymphocytes  1.2  


 


Absolute Monocytes  0.7  


 


Absolute Eosinophils  0.0  


 


Absolute Basophils  0.0  


 


VBG pH    7.14 L*


 


VBG pCO2    67.6 H*


 


VBG HCO3    22.7


 


VBG Base Excess    -7.5


 


Sodium   140.6 


 


Potassium   6.0 H* 


 


Chloride   106 


 


Carbon Dioxide   21 L 


 


Anion Gap   14 


 


BUN   39 H 


 


Creatinine   2.94 H 


 


Est GFR ( Amer)   20 L 


 


Est GFR (Non-Af Amer)   17 L 


 


Glucose   139 H 


 


Calcium   9.3 


 


Total Bilirubin   0.4 


 


Direct Bilirubin    


 


Indirect Bilirubin   Not Reportable 


 


Neonat Total Bilirubin   Not Reportable 


 


AST   15 


 


ALT   24 


 


Alkaline Phosphatase   69 


 


Total Protein   7.6 


 


Albumin   4.1 


 


Urine Color   


 


Urine Appearance   


 


Urine pH   


 


Ur Specific Gravity   


 


Urine Protein   


 


Urine Glucose (UA)   


 


Urine Ketones   


 


Urine Blood   


 


Urine Nitrite   


 


Urine Bilirubin   


 


Urine Urobilinogen   


 


Ur Leukocyte Esterase   


 


Urine WBC (Auto)   


 


Urine RBC (Auto)   


 


U Hyaline Cast (Auto)   


 


Urine Bacteria (Auto)   


 


Urine Mucus (Auto)   


 


Urine Ascorbic Acid   














  17





  17:25


 


WBC 


 


RBC 


 


Hgb 


 


Hct 


 


MCV 


 


MCH 


 


MCHC 


 


RDW 


 


Plt Count 


 


Seg Neutrophils % 


 


Lymphocytes % 


 


Monocytes % 


 


Eosinophils % 


 


Basophils % 


 


Absolute Neutrophils 


 


Absolute Lymphocytes 


 


Absolute Monocytes 


 


Absolute Eosinophils 


 


Absolute Basophils 


 


VBG pH 


 


VBG pCO2 


 


VBG HCO3 


 


VBG Base Excess 


 


Sodium 


 


Potassium 


 


Chloride 


 


Carbon Dioxide 


 


Anion Gap 


 


BUN 


 


Creatinine 


 


Est GFR ( Amer) 


 


Est GFR (Non-Af Amer) 


 


Glucose 


 


Calcium 


 


Total Bilirubin 


 


Direct Bilirubin 


 


Indirect Bilirubin 


 


Neonat Total Bilirubin 


 


AST 


 


ALT 


 


Alkaline Phosphatase 


 


Total Protein 


 


Albumin 


 


Urine Color  YELLOW


 


Urine Appearance  SLIGHTLY-CLOUDY


 


Urine pH  5.0


 


Ur Specific Gravity  1.014


 


Urine Protein  NEGATIVE


 


Urine Glucose (UA)  150 H


 


Urine Ketones  NEGATIVE


 


Urine Blood  NEGATIVE


 


Urine Nitrite  NEGATIVE


 


Urine Bilirubin  NEGATIVE


 


Urine Urobilinogen  NEGATIVE


 


Ur Leukocyte Esterase  NEGATIVE


 


Urine WBC (Auto)  2


 


Urine RBC (Auto)  1


 


U Hyaline Cast (Auto)  4


 


Urine Bacteria (Auto)  TRACE


 


Urine Mucus (Auto)  RARE


 


Urine Ascorbic Acid  20 H











EKG Comments: 


Sinus rhythm, first-degree AV block, no peaked T waves noted


Impressions: 


 





Chest X-Ray  17 17:38


IMPRESSION:  NO ACUTE RADIOGRAPHIC FINDING IN THE CHEST.


 














Assessment & Plan





- Diagnosis


(1) Acute respiratory failure with hypercapnia


Is this a current diagnosis for this admission?: YesPlan: 


Admit to intensive care unit for close monitoring.  Likely secondary to 

oversedation from high-dose opiate analgesics.  Hold narcotics.  Patient did 

respond to initial dose of Narcan in the emergency department.  We will repeat 

ABG in 1 hour on BiPAP.








(2) GOSIA (obstructive sleep apnea)


Is this a current diagnosis for this admission?: YesPlan: 


Contributing to respiratory failure in conjunction with opiate analgesics.








(3) Encephalopathy acute


Is this a current diagnosis for this admission?: YesPlan: 


Likely secondary to polypharmacy and hypercapnic respiratory failure.








(4) Polypharmacy


Is this a current diagnosis for this admission?: Yes





(5) ANNY (acute kidney injury)


Is this a current diagnosis for this admission?: YesPlan: 


Likely secondary to over diuresis and ACE inhibitor.  Hold these medications 

for now.  IV fluids.  Repeat labs in a.m.








(6) HTN (hypertension)





Is this a current diagnosis for this admission?: YesPlan: 


As needed IV hydralazine.  Verify home medications and resume as indicated.








(7) Diabetes





Is this a current diagnosis for this admission?: YesPlan: 


Hold patient's metformin secondary to acute kidney injury.  Sliding scale 

insulin coverage.








(8) Anemia


Qualifiers: 


     Anemia type: unspecified type        Qualified Code(s): D64.9 - Anemia, 

unspecified  


Is this a current diagnosis for this admission?: YesPlan: 


Recently Hemoccult negative.  Check anemia panel.  Monitor H&H for stability.  

Possibly secondary to chronic disease as patient has history of rheumatoid 

arthritis.








(9) C. difficile diarrhea


Is this a current diagnosis for this admission?: YesPlan: 


Patient completed treatment for this condition.








(10) Morbid obesity with BMI of 50.0-59.9, adult


Is this a current diagnosis for this admission?: Yes





(11) Diastolic CHF


Qualifiers: 


     Congestive heart failure chronicity: chronic        Qualified Code(s): 

I50.32 - Chronic diastolic (congestive) heart failure  


Is this a current diagnosis for this admission?: YesPlan: 


Patient is compensated from the standpoint and if anything a little over 

diuresed.  Hold medications for now.








(12) Rheumatoid arthritis


Qualifiers: 


     Laterality: unspecified laterality


Is this a current diagnosis for this admission?: YesPlan: 


Verify home medication








(13) Chronic pain


Qualifiers: 


     Chronic pain type: chronic pain syndrome        Qualified Code(s): G89.4 - 

Chronic pain syndrome  


Is this a current diagnosis for this admission?: YesPlan: 


Chronic opiate dependence.  Hold opiate medications for now secondary to 

encephalopathy and hypercapnic respiratory failure.








(14) Hyperkalemia


Is this a current diagnosis for this admission?: YesPlan: 


Secondary to acute kidney injury and medication side effects.  Hold patient's 

ACE inhibitor and Aldactone.  Administer IV fluids.  Repeat labs in a.m.











- Time


Critical Time spent with patient: 35 or more minutes

## 2017-07-15 LAB
ANION GAP SERPL CALC-SCNC: 10 MMOL/L (ref 5–19)
BASE EXCESS BLDA CALC-SCNC: -3.5 MMOL/L
BASE EXCESS BLDV CALC-SCNC: -3.9 MMOL/L
BASE EXCESS BLDV CALC-SCNC: -7.2 MMOL/L
BASOPHILS # BLD AUTO: 0 10^3/UL (ref 0–0.2)
BASOPHILS NFR BLD AUTO: 0.6 % (ref 0–2)
BUN SERPL-MCNC: 36 MG/DL (ref 7–20)
CALCIUM: 9.4 MG/DL (ref 8.4–10.2)
CHLORIDE SERPL-SCNC: 111 MMOL/L (ref 98–107)
CO2 SERPL-SCNC: 22 MMOL/L (ref 22–30)
CREAT SERPL-MCNC: 2.1 MG/DL (ref 0.52–1.25)
EOSINOPHIL # BLD AUTO: 0.1 10^3/UL (ref 0–0.6)
EOSINOPHIL NFR BLD AUTO: 1 % (ref 0–6)
ERYTHROCYTE [DISTWIDTH] IN BLOOD BY AUTOMATED COUNT: 16.9 % (ref 11.5–14)
GLUCOSE SERPL-MCNC: 95 MG/DL (ref 75–110)
HCO3 BLDV-SCNC: 22 MMOL/L (ref 20–32)
HCO3 BLDV-SCNC: 24.1 MMOL/L (ref 20–32)
HCT VFR BLD CALC: 27.7 % (ref 36–47)
HGB BLD-MCNC: 8.7 G/DL (ref 12–15.5)
HGB HCT DIFFERENCE: -1.6
LYMPHOCYTES # BLD AUTO: 1.9 10^3/UL (ref 0.5–4.7)
LYMPHOCYTES NFR BLD AUTO: 25.4 % (ref 13–45)
MAGNESIUM SERPL-MCNC: 1.7 MG/DL (ref 1.6–2.3)
MCH RBC QN AUTO: 24.3 PG (ref 27–33.4)
MCHC RBC AUTO-ENTMCNC: 31.3 G/DL (ref 32–36)
MCV RBC AUTO: 78 FL (ref 80–97)
MONOCYTES # BLD AUTO: 0.4 10^3/UL (ref 0.1–1.4)
MONOCYTES NFR BLD AUTO: 5 % (ref 3–13)
NEUTROPHILS # BLD AUTO: 5 10^3/UL (ref 1.7–8.2)
NEUTS SEG NFR BLD AUTO: 68 % (ref 42–78)
PCO2 BLDV: 60.3 MMHG (ref 35–63)
PCO2 BLDV: 62.6 MMHG (ref 35–63)
PH BLDV: 7.16 [PH] (ref 7.3–7.42)
PH BLDV: 7.22 [PH] (ref 7.3–7.42)
POTASSIUM SERPL-SCNC: 6.9 MMOL/L (ref 3.6–5)
RBC # BLD AUTO: 3.56 10^6/UL (ref 3.72–5.28)
SAO2 % BLDA: 98.1 % (ref 94–98)
SODIUM SERPL-SCNC: 142.7 MMOL/L (ref 137–145)
WBC # BLD AUTO: 7.3 10^3/UL (ref 4–10.5)

## 2017-07-15 PROCEDURE — 3E0F73Z INTRODUCTION OF ANTI-INFLAMMATORY INTO RESPIRATORY TRACT, VIA NATURAL OR ARTIFICIAL OPENING: ICD-10-PCS

## 2017-07-15 RX ADMIN — HEPARIN SODIUM SCH UNIT: 5000 INJECTION, SOLUTION INTRAVENOUS; SUBCUTANEOUS at 14:48

## 2017-07-15 RX ADMIN — HEPARIN SODIUM SCH UNIT: 5000 INJECTION, SOLUTION INTRAVENOUS; SUBCUTANEOUS at 21:21

## 2017-07-15 RX ADMIN — Medication SCH ML: at 15:12

## 2017-07-15 RX ADMIN — FLUTICASONE PROPIONATE SCH SPRAY: 50 SPRAY, METERED NASAL at 21:24

## 2017-07-15 RX ADMIN — DOCUSATE SODIUM SCH MG: 100 CAPSULE, LIQUID FILLED ORAL at 21:17

## 2017-07-15 RX ADMIN — MONTELUKAST SODIUM SCH MG: 10 TABLET, FILM COATED ORAL at 17:42

## 2017-07-15 RX ADMIN — Medication SCH ML: at 21:07

## 2017-07-15 RX ADMIN — FLUTICASONE PROPIONATE AND SALMETEROL SCH INH: 50; 250 POWDER RESPIRATORY (INHALATION) at 21:22

## 2017-07-15 RX ADMIN — ATORVASTATIN CALCIUM SCH MG: 20 TABLET, FILM COATED ORAL at 21:22

## 2017-07-15 RX ADMIN — Medication SCH ML: at 14:48

## 2017-07-15 RX ADMIN — PREGABALIN SCH MG: 75 CAPSULE ORAL at 21:22

## 2017-07-15 RX ADMIN — HEPARIN SODIUM SCH UNIT: 5000 INJECTION, SOLUTION INTRAVENOUS; SUBCUTANEOUS at 07:27

## 2017-07-15 RX ADMIN — INSULIN LISPRO SCH UNIT: 100 INJECTION, SOLUTION INTRAVENOUS; SUBCUTANEOUS at 17:42

## 2017-07-15 RX ADMIN — INSULIN LISPRO SCH UNIT: 100 INJECTION, SOLUTION INTRAVENOUS; SUBCUTANEOUS at 14:48

## 2017-07-15 RX ADMIN — DULOXETINE SCH MG: 30 CAPSULE, DELAYED RELEASE ORAL at 21:22

## 2017-07-15 RX ADMIN — SODIUM CHLORIDE PRN ML: 9 INJECTION, SOLUTION INTRAVENOUS at 14:44

## 2017-07-15 RX ADMIN — FAMOTIDINE SCH MG: 20 TABLET, FILM COATED ORAL at 21:22

## 2017-07-15 RX ADMIN — SULFASALAZINE SCH MG: 500 TABLET, DELAYED RELEASE ORAL at 17:42

## 2017-07-15 NOTE — PDOC PROGRESS REPORT
Subjective


Progress Note for:: 07/15/17


Subjective:: 


Patient is less somnolent and she was yesterday.  She still slightly drowsy and 

does not recall the events of yesterday.  She does not remember our 

conversation regarding her admission yesterday evening.  She denies chest pain 

or shortness of breath.  Her only concern is that she would like to have a 

phone to call her family members and let them know where she is.





Physical Exam


Vital Signs: 


 











Temp Pulse Resp BP Pulse Ox


 


 97.4 F   90   20   130/79 H  100 


 


 07/15/17 12:12  07/15/17 14:00  07/15/17 12:12  07/15/17 12:12  07/15/17 12:12








 Intake & Output











 07/14/17 07/15/17 07/16/17





 06:59 06:59 06:59


 


Output Total  900 


 


Balance  -900 


 


Weight  131.995 kg 143.1 kg








GENERAL: No acute distress, morbidly obese


HEENT: Conjunctiva clear, nonicteric, moist mucous membranes, no JVD, midline 

trachea


RESPIRATORY: Clear to auscultation bilaterally, no wheezes, no rhonchi


CARDIAC: Regular rate and rhythm, no murmurs/gallops/rubs


ABDOMEN: Soft, nondistended, nontender, positive bowel sounds, no rebound, no 

guarding


EXTREMETIES: No edema, cyanosis, clubbing


NEUROLOGIC: Drowsy, oriented to person/place/time, CN's grossly intact,  no 

focal deficits


SKIN: No rash, wounds


PSYCH: Flat affect








Results


Laboratory Results: 


 





 07/15/17 05:55 





 07/15/17 05:55 





 











  07/14/17 07/15/17 07/15/17





  21:51 03:34 05:55


 


WBC    7.3


 


RBC    3.56 L


 


Hgb    8.7 L


 


Hct    27.7 L


 


MCV    78 L


 


MCH    24.3 L


 


MCHC    31.3 L


 


RDW    16.9 H


 


Plt Count    192


 


Seg Neutrophils %    68.0


 


Lymphocytes %    25.4


 


Monocytes %    5.0


 


Eosinophils %    1.0


 


Basophils %    0.6


 


Absolute Neutrophils    5.0


 


Absolute Lymphocytes    1.9


 


Absolute Monocytes    0.4


 


Absolute Eosinophils    0.1


 


Absolute Basophils    0.0


 


Carbonic Acid  1.86 H  


 


HCO3/H2CO3 Ratio  12:1  


 


ABG pH  7.20 L*  


 


ABG pCO2  61.8 H  


 


ABG pO2  94.4  


 


ABG HCO3  23.5  


 


ABG O2 Saturation  95.3  


 


ABG Base Excess  -4.9  


 


VBG pH   7.16 L* 


 


VBG pCO2   62.6 


 


VBG HCO3   22.0 


 


VBG Base Excess   -7.2 


 


FiO2  30%  


 


Sodium   


 


Potassium   


 


Chloride   


 


Carbon Dioxide   


 


Anion Gap   


 


BUN   


 


Creatinine   


 


Est GFR ( Amer)   


 


Est GFR (Non-Af Amer)   


 


Glucose   


 


Calcium   


 


Magnesium   














  07/15/17 07/15/17 07/15/17





  05:55 05:55 08:19


 


WBC   


 


RBC   


 


Hgb   


 


Hct   


 


MCV   


 


MCH   


 


MCHC   


 


RDW   


 


Plt Count   


 


Seg Neutrophils %   


 


Lymphocytes %   


 


Monocytes %   


 


Eosinophils %   


 


Basophils %   


 


Absolute Neutrophils   


 


Absolute Lymphocytes   


 


Absolute Monocytes   


 


Absolute Eosinophils   


 


Absolute Basophils   


 


Carbonic Acid    1.69 H


 


HCO3/H2CO3 Ratio    14:1


 


ABG pH    7.25 L


 


ABG pCO2    56.2 H


 


ABG pO2    132.0 H


 


ABG HCO3    23.9


 


ABG O2 Saturation    98.1 H


 


ABG Base Excess    -3.5


 


VBG pH   


 


VBG pCO2   


 


VBG HCO3   


 


VBG Base Excess   


 


FiO2    30%


 


Sodium  Cancelled  142.7 


 


Potassium  Cancelled  6.9 H* 


 


Chloride  Cancelled  111 H 


 


Carbon Dioxide  Cancelled  22 


 


Anion Gap  Cancelled  10 


 


BUN  Cancelled  36 H 


 


Creatinine  Cancelled  2.10 H 


 


Est GFR ( Amer)  Cancelled  30 L 


 


Est GFR (Non-Af Amer)  Cancelled  24 L 


 


Glucose  Cancelled  95 


 


Calcium  Cancelled  9.4 


 


Magnesium  Cancelled  1.7 











Impressions: 


 





Chest X-Ray  07/15/17 06:00


IMPRESSION:  NO ACUTE RADIOGRAPHIC FINDING IN THE CHEST.


 














Assessment & Plan





- Diagnosis


(1) Acute respiratory failure with hypercapnia


Is this a current diagnosis for this admission?: YesPlan: 





Likely secondary to oversedation from high-dose opiate analgesics.  Holding 

narcotics.  Respiratory acidosis is slowly correcting and mental status is 

improving.  Continue BiPAP for now until patient is more alert and respiratory 

acidosis is resolved.








(2) GOSIA (obstructive sleep apnea)


Is this a current diagnosis for this admission?: YesPlan: 


Contributing to respiratory failure in conjunction with opiate analgesics.








(3) Encephalopathy acute


Is this a current diagnosis for this admission?: YesPlan: 





Likely secondary to polypharmacy and hypercapnic respiratory failure.  

Improving as compared to admission.  Continue to hold neuroactive medications.








(4) Polypharmacy


Is this a current diagnosis for this admission?: YesPlan: 


We will need to take a serious look at medications, particularly respiratory 

depressant medication might of patient's morbid obesity, obstructive sleep apnea

, respiratory failure, encephalopathy.








(5) ANNY (acute kidney injury)


Is this a current diagnosis for this admission?: YesPlan: 


Improving.  Continue to hold diuretics, lisinopril, Celebrex.








(6) HTN (hypertension)





Is this a current diagnosis for this admission?: YesPlan: 


Patient remains hypotensive.  Continue to hold blood pressure medicines and 

diuretics.  Continue IV fluids.








(7) Diabetes





Is this a current diagnosis for this admission?: YesPlan: 





Holding patient's metformin secondary to acute kidney injury.  Sliding scale 

insulin coverage.








(8) Anemia


Qualifiers: 


     Anemia type: unspecified type        Qualified Code(s): D64.9 - Anemia, 

unspecified  


Is this a current diagnosis for this admission?: YesPlan: 





Monitor H&H for stability.  Possibly secondary to chronic disease as patient 

has history of rheumatoid arthritis.








(9) C. difficile diarrhea


Is this a current diagnosis for this admission?: Yes





(10) Morbid obesity with BMI of 50.0-59.9, adult


Is this a current diagnosis for this admission?: Yes





(11) Diastolic CHF


Qualifiers: 


     Congestive heart failure chronicity: chronic        Qualified Code(s): 

I50.32 - Chronic diastolic (congestive) heart failure  


Is this a current diagnosis for this admission?: YesPlan: 


Patient is compensated from the standpoint and if anything a little over 

diuresed.  Hold medications for now.








(12) Rheumatoid arthritis


Qualifiers: 


     Laterality: unspecified laterality


Is this a current diagnosis for this admission?: Yes





(13) Chronic pain


Qualifiers: 


     Chronic pain type: chronic pain syndrome        Qualified Code(s): G89.4 - 

Chronic pain syndrome  


Is this a current diagnosis for this admission?: Yes





(14) Hyperkalemia


Is this a current diagnosis for this admission?: Yes








- Time


Time Spent with patient: 35 or more minutes

## 2017-07-15 NOTE — RADIOLOGY REPORT (SQ)
EXAM DESCRIPTION:  CHEST SINGLE VIEW



COMPLETED DATE/TIME:  7/15/2017 7:29 am



REASON FOR STUDY:  resp failure



COMPARISON:  Chest x-ray 7/14/2017.



EXAM PARAMETERS:  NUMBER OF VIEWS: One view.

TECHNIQUE: Single frontal radiographic view of the chest acquired.

RADIATION DOSE: NA

LIMITATIONS: None.



FINDINGS:  LUNGS AND PLEURA: No consolidation, pneumothorax or pleural effusion.

MEDIASTINUM AND HILAR STRUCTURES: No masses.  Contour normal.

HEART AND VASCULAR STRUCTURES: Heart upper normal limit in size.  No overt vascular congestion.

BONES: No acute findings.

HARDWARE: None in the chest.



IMPRESSION:  NO ACUTE RADIOGRAPHIC FINDING IN THE CHEST.



TECHNICAL DOCUMENTATION:  JOB ID:  [id number]

[id number]

## 2017-07-16 LAB
ANION GAP SERPL CALC-SCNC: 7 MMOL/L (ref 5–19)
BASE EXCESS BLDA CALC-SCNC: -0.7 MMOL/L
BASOPHILS # BLD AUTO: 0.1 10^3/UL (ref 0–0.2)
BASOPHILS NFR BLD AUTO: 1.9 % (ref 0–2)
BUN SERPL-MCNC: 26 MG/DL (ref 7–20)
CALCIUM: 9.2 MG/DL (ref 8.4–10.2)
CHLORIDE SERPL-SCNC: 113 MMOL/L (ref 98–107)
CO2 SERPL-SCNC: 24 MMOL/L (ref 22–30)
CREAT SERPL-MCNC: 1.27 MG/DL (ref 0.52–1.25)
EOSINOPHIL # BLD AUTO: 0.2 10^3/UL (ref 0–0.6)
EOSINOPHIL NFR BLD AUTO: 4.2 % (ref 0–6)
ERYTHROCYTE [DISTWIDTH] IN BLOOD BY AUTOMATED COUNT: 16.5 % (ref 11.5–14)
GLUCOSE SERPL-MCNC: 142 MG/DL (ref 75–110)
HCT VFR BLD CALC: 26.1 % (ref 36–47)
HGB BLD-MCNC: 8.1 G/DL (ref 12–15.5)
HGB HCT DIFFERENCE: -1.8
LYMPHOCYTES # BLD AUTO: 1.6 10^3/UL (ref 0.5–4.7)
LYMPHOCYTES NFR BLD AUTO: 36 % (ref 13–45)
MCH RBC QN AUTO: 24.1 PG (ref 27–33.4)
MCHC RBC AUTO-ENTMCNC: 31.1 G/DL (ref 32–36)
MCV RBC AUTO: 77 FL (ref 80–97)
MONOCYTES # BLD AUTO: 0.2 10^3/UL (ref 0.1–1.4)
MONOCYTES NFR BLD AUTO: 4 % (ref 3–13)
NEUTROPHILS # BLD AUTO: 2.4 10^3/UL (ref 1.7–8.2)
NEUTS SEG NFR BLD AUTO: 53.9 % (ref 42–78)
POTASSIUM SERPL-SCNC: 5.4 MMOL/L (ref 3.6–5)
RBC # BLD AUTO: 3.38 10^6/UL (ref 3.72–5.28)
SAO2 % BLDA: 97.5 % (ref 94–98)
SODIUM SERPL-SCNC: 143.6 MMOL/L (ref 137–145)
WBC # BLD AUTO: 4.4 10^3/UL (ref 4–10.5)

## 2017-07-16 RX ADMIN — FOLIC ACID SCH MG: 1 TABLET ORAL at 09:34

## 2017-07-16 RX ADMIN — FLUTICASONE PROPIONATE SCH SPRAY: 50 SPRAY, METERED NASAL at 09:33

## 2017-07-16 RX ADMIN — CETIRIZINE HYDROCHLORIDE SCH MG: 10 TABLET, FILM COATED ORAL at 09:32

## 2017-07-16 RX ADMIN — HEPARIN SODIUM SCH UNIT: 5000 INJECTION, SOLUTION INTRAVENOUS; SUBCUTANEOUS at 13:00

## 2017-07-16 RX ADMIN — FAMOTIDINE SCH MG: 20 TABLET, FILM COATED ORAL at 21:06

## 2017-07-16 RX ADMIN — SULFASALAZINE SCH MG: 500 TABLET, DELAYED RELEASE ORAL at 09:32

## 2017-07-16 RX ADMIN — MONTELUKAST SODIUM SCH MG: 10 TABLET, FILM COATED ORAL at 16:00

## 2017-07-16 RX ADMIN — Medication SCH ML: at 21:03

## 2017-07-16 RX ADMIN — INSULIN LISPRO SCH UNIT: 100 INJECTION, SOLUTION INTRAVENOUS; SUBCUTANEOUS at 18:08

## 2017-07-16 RX ADMIN — SODIUM CHLORIDE PRN ML: 9 INJECTION, SOLUTION INTRAVENOUS at 09:34

## 2017-07-16 RX ADMIN — DULOXETINE SCH MG: 30 CAPSULE, DELAYED RELEASE ORAL at 21:05

## 2017-07-16 RX ADMIN — SULFASALAZINE SCH MG: 500 TABLET, DELAYED RELEASE ORAL at 16:00

## 2017-07-16 RX ADMIN — HEPARIN SODIUM SCH UNIT: 5000 INJECTION, SOLUTION INTRAVENOUS; SUBCUTANEOUS at 21:05

## 2017-07-16 RX ADMIN — CLOPIDOGREL BISULFATE SCH MG: 75 TABLET, FILM COATED ORAL at 09:32

## 2017-07-16 RX ADMIN — ATORVASTATIN CALCIUM SCH MG: 20 TABLET, FILM COATED ORAL at 21:06

## 2017-07-16 RX ADMIN — PREGABALIN SCH MG: 75 CAPSULE ORAL at 21:05

## 2017-07-16 RX ADMIN — HEPARIN SODIUM SCH UNIT: 5000 INJECTION, SOLUTION INTRAVENOUS; SUBCUTANEOUS at 05:22

## 2017-07-16 RX ADMIN — PREGABALIN SCH MG: 75 CAPSULE ORAL at 09:32

## 2017-07-16 RX ADMIN — DOCUSATE SODIUM SCH MG: 100 CAPSULE, LIQUID FILLED ORAL at 09:32

## 2017-07-16 RX ADMIN — FLUTICASONE PROPIONATE AND SALMETEROL SCH INH: 50; 250 POWDER RESPIRATORY (INHALATION) at 21:06

## 2017-07-16 RX ADMIN — Medication SCH ML: at 15:00

## 2017-07-16 RX ADMIN — INSULIN LISPRO SCH UNIT: 100 INJECTION, SOLUTION INTRAVENOUS; SUBCUTANEOUS at 11:10

## 2017-07-16 RX ADMIN — Medication SCH ML: at 05:09

## 2017-07-16 RX ADMIN — SULFASALAZINE SCH MG: 500 TABLET, DELAYED RELEASE ORAL at 13:00

## 2017-07-16 RX ADMIN — FLUTICASONE PROPIONATE AND SALMETEROL SCH INH: 50; 250 POWDER RESPIRATORY (INHALATION) at 09:33

## 2017-07-16 RX ADMIN — SODIUM CHLORIDE PRN ML: 9 INJECTION, SOLUTION INTRAVENOUS at 05:22

## 2017-07-16 RX ADMIN — PRENATAL W/O A VIT W/ FE FUMARATE-FA CAP 106.5-1 MG SCH CAP: 106.5 CAPSULE ORAL at 09:32

## 2017-07-16 RX ADMIN — FLUTICASONE PROPIONATE SCH SPRAY: 50 SPRAY, METERED NASAL at 21:06

## 2017-07-16 RX ADMIN — DULOXETINE SCH MG: 30 CAPSULE, DELAYED RELEASE ORAL at 09:32

## 2017-07-16 RX ADMIN — DOCUSATE SODIUM SCH MG: 100 CAPSULE, LIQUID FILLED ORAL at 21:06

## 2017-07-16 RX ADMIN — INSULIN LISPRO SCH UNIT: 100 INJECTION, SOLUTION INTRAVENOUS; SUBCUTANEOUS at 09:34

## 2017-07-16 NOTE — PDOC PROGRESS REPORT
Subjective


Progress Note for:: 07/16/17


Subjective:: 


Patient remains somnolent but easily arousable and has a normal conversation.  

She states she feels much better in general but is not back to her baseline 

with regards to generalized weakness and level of consciousness.  She denies 

fever, chills, headache, chest pain, shortness of breath, abdominal pain, nausea

, vomiting.  She does not complain of somatic pain.





Physical Exam


Vital Signs: 


 











Temp Pulse Resp BP Pulse Ox


 


 98.0 F   91   20   124/62   100 


 


 07/16/17 07:49  07/16/17 07:49  07/16/17 07:49  07/16/17 07:49  07/16/17 07:49








 Intake & Output











 07/15/17 07/16/17 07/17/17





 06:59 06:59 06:59


 


Intake Total  3102 


 


Output Total 900 2775 


 


Balance -900 327 


 


Weight 131.995 kg 142.1 kg 








GENERAL: No acute distress, morbidly obese


HEENT: Conjunctiva clear, nonicteric, moist mucous membranes, no JVD, midline 

trachea


RESPIRATORY: Clear to auscultation bilaterally, no wheezes, no rhonchi


CARDIAC: Regular rate and rhythm, no murmurs/gallops/rubs


ABDOMEN: Soft, nondistended, nontender, positive bowel sounds, no rebound, no 

guarding


EXTREMETIES: No edema, cyanosis, clubbing


NEUROLOGIC: Drowsy, oriented to person/place/time, CN's grossly intact,  no 

focal deficits


SKIN: No rash, wounds


PSYCH: Flat affect








Results


Laboratory Results: 


 





 07/16/17 05:30 





 07/16/17 05:30 





 











  07/15/17 07/15/17 07/16/17





  15:10 18:10 05:30


 


WBC    4.4


 


RBC    3.38 L


 


Hgb    8.1 L


 


Hct    26.1 L


 


MCV    77 L


 


MCH    24.1 L


 


MCHC    31.1 L


 


RDW    16.5 H


 


Plt Count    178


 


Seg Neutrophils %    53.9


 


Lymphocytes %    36.0


 


Monocytes %    4.0


 


Eosinophils %    4.2


 


Basophils %    1.9


 


Absolute Neutrophils    2.4


 


Absolute Lymphocytes    1.6


 


Absolute Monocytes    0.2


 


Absolute Eosinophils    0.2


 


Absolute Basophils    0.1


 


Carbonic Acid   


 


HCO3/H2CO3 Ratio   


 


ABG pH   


 


ABG pCO2   


 


ABG pO2   


 


ABG HCO3   


 


ABG O2 Saturation   


 


ABG Base Excess   


 


VBG pH  7.22 L  


 


VBG pCO2  60.3  


 


VBG HCO3  24.1  


 


VBG Base Excess  -3.9  


 


FiO2   


 


Sodium   


 


Potassium   


 


Chloride   


 


Carbon Dioxide   


 


Anion Gap   


 


BUN   


 


Creatinine   


 


Est GFR ( Amer)   


 


Est GFR (Non-Af Amer)   


 


Glucose   


 


Lactic Acid   1.8 


 


Calcium   














  07/16/17 07/16/17





  05:30 06:50


 


WBC  


 


RBC  


 


Hgb  


 


Hct  


 


MCV  


 


MCH  


 


MCHC  


 


RDW  


 


Plt Count  


 


Seg Neutrophils %  


 


Lymphocytes %  


 


Monocytes %  


 


Eosinophils %  


 


Basophils %  


 


Absolute Neutrophils  


 


Absolute Lymphocytes  


 


Absolute Monocytes  


 


Absolute Eosinophils  


 


Absolute Basophils  


 


Carbonic Acid   1.60 H


 


HCO3/H2CO3 Ratio   16:1


 


ABG pH   7.31 L


 


ABG pCO2   53.0 H


 


ABG pO2   108.7 H


 


ABG HCO3   25.8


 


ABG O2 Saturation   97.5


 


ABG Base Excess   -0.7


 


VBG pH  


 


VBG pCO2  


 


VBG HCO3  


 


VBG Base Excess  


 


FiO2   2.5LITERS


 


Sodium  143.6 


 


Potassium  5.4 H D 


 


Chloride  113 H 


 


Carbon Dioxide  24 


 


Anion Gap  7 


 


BUN  26 H 


 


Creatinine  1.27 H 


 


Est GFR ( Amer)  53 L 


 


Est GFR (Non-Af Amer)  44 L 


 


Glucose  142 H 


 


Lactic Acid  


 


Calcium  9.2 











Impressions: 


 





Chest X-Ray  07/15/17 06:00


IMPRESSION:  NO ACUTE RADIOGRAPHIC FINDING IN THE CHEST.


 














Assessment & Plan





- Diagnosis


(1) Acute respiratory failure with hypercapnia


Is this a current diagnosis for this admission?: YesPlan: 








Likely secondary to oversedation from high-dose opiate analgesics.  Continue 

holding narcotics.  Respiratory acidosis is slowly correcting and mental status 

is improving.  Continue BiPAP while sleeping.  Nasal cannula oxygen while awake 

to maintain O2 sat 88-92%.








(2) GOSIA (obstructive sleep apnea)


Is this a current diagnosis for this admission?: YesPlan: 


Contributing to respiratory failure in conjunction with opiate analgesics.








(3) Encephalopathy acute


Is this a current diagnosis for this admission?: YesPlan: 





Likely secondary to polypharmacy and hypercapnic respiratory failure.  

Improving as compared to admission.  Continue to hold neuroactive medications.








(4) Polypharmacy


Is this a current diagnosis for this admission?: YesPlan: 


We will need to take a serious look at medications, particularly respiratory 

depressant medication might of patient's morbid obesity, obstructive sleep apnea

, respiratory failure, encephalopathy.








(5) NANY (acute kidney injury)


Is this a current diagnosis for this admission?: YesPlan: 





Improving.  Continue to hold diuretics, lisinopril, Celebrex.  Continue IV 

fluids for another 24 hours.








(6) HTN (hypertension)





Is this a current diagnosis for this admission?: YesPlan: 





Hypotension has resolved.  Patient's blood pressure is in the low normal range.

  I will restart Toprol-XL at 100 mg daily (usual home dose is 100 mg twice 

daily).  Continue to hold Norvasc, verapamil, lisinopril, Lasix, spironolactone.








(7) Diabetes





Is this a current diagnosis for this admission?: YesPlan: 





Holding patient's metformin secondary to acute kidney injury.  Sliding scale 

insulin coverage.








(8) Anemia


Qualifiers: 


     Anemia type: unspecified type        Qualified Code(s): D64.9 - Anemia, 

unspecified  


Is this a current diagnosis for this admission?: YesPlan: 








Declining H&H likely secondary to hemodilution artifact.  We will need to 

transfuse if hemoglobin less than 8.0.  Possibly secondary to chronic disease 

as patient has history of rheumatoid arthritis.








(9) C. difficile diarrhea


Is this a current diagnosis for this admission?: YesPlan: 


Patient completed treatment for this condition.








(10) Morbid obesity with BMI of 50.0-59.9, adult


Is this a current diagnosis for this admission?: Yes





(11) Diastolic CHF


Qualifiers: 


     Congestive heart failure chronicity: chronic        Qualified Code(s): 

I50.32 - Chronic diastolic (congestive) heart failure  


Is this a current diagnosis for this admission?: YesPlan: 





Patient is compensated from the standpoint and if anything a little over 

diuresed.  Restart Toprol-XL at lower dose secondary to hypotension.  Continue 

to hold diuretics for now secondary to overdiuresis.  Monitor closely with 

gentle rehydration.








(12) Rheumatoid arthritis


Qualifiers: 


     Laterality: unspecified laterality


Is this a current diagnosis for this admission?: YesPlan: 





Patient is on methotrexate weekly.








(13) Chronic pain


Qualifiers: 


     Chronic pain type: chronic pain syndrome        Qualified Code(s): G89.4 - 

Chronic pain syndrome  


Is this a current diagnosis for this admission?: YesPlan: 


Chronic opiate dependence.  Hold opiate medications for now secondary to 

encephalopathy and hypercapnic respiratory failure.








(14) Hyperkalemia


Is this a current diagnosis for this admission?: YesPlan: 


Secondary to acute kidney injury and medication side effects.  Hold patient's 

ACE inhibitor and Aldactone.  Administer IV fluids.  Repeat labs in a.m.











- Time


Time Spent with patient: 35 or more minutes


Anticipated discharge: Home


Within: within 48 hours

## 2017-07-17 LAB
ANION GAP SERPL CALC-SCNC: 7 MMOL/L (ref 5–19)
BASE EXCESS BLDA CALC-SCNC: 2.2 MMOL/L
BASOPHILS # BLD AUTO: 0 10^3/UL (ref 0–0.2)
BASOPHILS NFR BLD AUTO: 1.4 % (ref 0–2)
BUN SERPL-MCNC: 15 MG/DL (ref 7–20)
CALCIUM: 9.7 MG/DL (ref 8.4–10.2)
CHLORIDE SERPL-SCNC: 115 MMOL/L (ref 98–107)
CO2 SERPL-SCNC: 24 MMOL/L (ref 22–30)
CREAT SERPL-MCNC: 0.87 MG/DL (ref 0.52–1.25)
EOSINOPHIL # BLD AUTO: 0.1 10^3/UL (ref 0–0.6)
EOSINOPHIL NFR BLD AUTO: 4 % (ref 0–6)
ERYTHROCYTE [DISTWIDTH] IN BLOOD BY AUTOMATED COUNT: 16.3 % (ref 11.5–14)
GLUCOSE SERPL-MCNC: 111 MG/DL (ref 75–110)
HCT VFR BLD CALC: 26 % (ref 36–47)
HGB BLD-MCNC: 8.1 G/DL (ref 12–15.5)
HGB HCT DIFFERENCE: -1.7
LYMPHOCYTES # BLD AUTO: 1.5 10^3/UL (ref 0.5–4.7)
LYMPHOCYTES NFR BLD AUTO: 42.1 % (ref 13–45)
MCH RBC QN AUTO: 23.9 PG (ref 27–33.4)
MCHC RBC AUTO-ENTMCNC: 31 G/DL (ref 32–36)
MCV RBC AUTO: 77 FL (ref 80–97)
MONOCYTES # BLD AUTO: 0.1 10^3/UL (ref 0.1–1.4)
MONOCYTES NFR BLD AUTO: 3.3 % (ref 3–13)
NEUTROPHILS # BLD AUTO: 1.7 10^3/UL (ref 1.7–8.2)
NEUTS SEG NFR BLD AUTO: 49.2 % (ref 42–78)
POTASSIUM SERPL-SCNC: 4.5 MMOL/L (ref 3.6–5)
RBC # BLD AUTO: 3.38 10^6/UL (ref 3.72–5.28)
SAO2 % BLDA: 95.1 % (ref 94–98)
SODIUM SERPL-SCNC: 145.8 MMOL/L (ref 137–145)
WBC # BLD AUTO: 3.5 10^3/UL (ref 4–10.5)

## 2017-07-17 RX ADMIN — INSULIN LISPRO SCH UNIT: 100 INJECTION, SOLUTION INTRAVENOUS; SUBCUTANEOUS at 09:39

## 2017-07-17 RX ADMIN — Medication SCH ML: at 13:06

## 2017-07-17 RX ADMIN — PREGABALIN SCH MG: 75 CAPSULE ORAL at 21:01

## 2017-07-17 RX ADMIN — FLUTICASONE PROPIONATE AND SALMETEROL SCH INH: 50; 250 POWDER RESPIRATORY (INHALATION) at 09:38

## 2017-07-17 RX ADMIN — ATORVASTATIN CALCIUM SCH MG: 20 TABLET, FILM COATED ORAL at 21:00

## 2017-07-17 RX ADMIN — INSULIN LISPRO SCH UNIT: 100 INJECTION, SOLUTION INTRAVENOUS; SUBCUTANEOUS at 13:06

## 2017-07-17 RX ADMIN — PREGABALIN SCH MG: 75 CAPSULE ORAL at 09:38

## 2017-07-17 RX ADMIN — DULOXETINE SCH MG: 30 CAPSULE, DELAYED RELEASE ORAL at 09:38

## 2017-07-17 RX ADMIN — DULOXETINE SCH MG: 30 CAPSULE, DELAYED RELEASE ORAL at 21:01

## 2017-07-17 RX ADMIN — PRENATAL W/O A VIT W/ FE FUMARATE-FA CAP 106.5-1 MG SCH CAP: 106.5 CAPSULE ORAL at 09:37

## 2017-07-17 RX ADMIN — HEPARIN SODIUM SCH UNIT: 5000 INJECTION, SOLUTION INTRAVENOUS; SUBCUTANEOUS at 05:19

## 2017-07-17 RX ADMIN — HEPARIN SODIUM SCH UNIT: 5000 INJECTION, SOLUTION INTRAVENOUS; SUBCUTANEOUS at 13:06

## 2017-07-17 RX ADMIN — Medication SCH ML: at 21:02

## 2017-07-17 RX ADMIN — SULFASALAZINE SCH MG: 500 TABLET, DELAYED RELEASE ORAL at 19:11

## 2017-07-17 RX ADMIN — FOLIC ACID SCH MG: 1 TABLET ORAL at 09:38

## 2017-07-17 RX ADMIN — SULFASALAZINE SCH MG: 500 TABLET, DELAYED RELEASE ORAL at 09:37

## 2017-07-17 RX ADMIN — METOPROLOL SUCCINATE SCH MG: 50 TABLET, EXTENDED RELEASE ORAL at 13:05

## 2017-07-17 RX ADMIN — FLUTICASONE PROPIONATE SCH SPRAY: 50 SPRAY, METERED NASAL at 21:02

## 2017-07-17 RX ADMIN — SULFASALAZINE SCH MG: 500 TABLET, DELAYED RELEASE ORAL at 13:06

## 2017-07-17 RX ADMIN — FLUTICASONE PROPIONATE AND SALMETEROL SCH INH: 50; 250 POWDER RESPIRATORY (INHALATION) at 21:00

## 2017-07-17 RX ADMIN — FAMOTIDINE SCH MG: 20 TABLET, FILM COATED ORAL at 21:01

## 2017-07-17 RX ADMIN — CLOPIDOGREL BISULFATE SCH MG: 75 TABLET, FILM COATED ORAL at 09:37

## 2017-07-17 RX ADMIN — HEPARIN SODIUM SCH UNIT: 5000 INJECTION, SOLUTION INTRAVENOUS; SUBCUTANEOUS at 21:09

## 2017-07-17 RX ADMIN — FLUTICASONE PROPIONATE SCH SPRAY: 50 SPRAY, METERED NASAL at 09:38

## 2017-07-17 RX ADMIN — CETIRIZINE HYDROCHLORIDE SCH MG: 10 TABLET, FILM COATED ORAL at 09:37

## 2017-07-17 RX ADMIN — INSULIN LISPRO SCH UNIT: 100 INJECTION, SOLUTION INTRAVENOUS; SUBCUTANEOUS at 19:11

## 2017-07-17 RX ADMIN — Medication SCH ML: at 05:14

## 2017-07-17 RX ADMIN — MONTELUKAST SODIUM SCH MG: 10 TABLET, FILM COATED ORAL at 19:10

## 2017-07-17 RX ADMIN — DOCUSATE SODIUM SCH MG: 100 CAPSULE, LIQUID FILLED ORAL at 09:38

## 2017-07-17 RX ADMIN — DOCUSATE SODIUM SCH MG: 100 CAPSULE, LIQUID FILLED ORAL at 21:03
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
## 2017-07-18 RX ADMIN — MORPHINE SULFATE SCH MG: 15 TABLET, EXTENDED RELEASE ORAL at 22:18

## 2017-07-18 RX ADMIN — HEPARIN SODIUM SCH UNIT: 5000 INJECTION, SOLUTION INTRAVENOUS; SUBCUTANEOUS at 05:41

## 2017-07-18 RX ADMIN — FLUTICASONE PROPIONATE SCH SPRAY: 50 SPRAY, METERED NASAL at 22:22

## 2017-07-18 RX ADMIN — CETIRIZINE HYDROCHLORIDE SCH MG: 10 TABLET, FILM COATED ORAL at 09:10

## 2017-07-18 RX ADMIN — AMLODIPINE BESYLATE SCH MG: 10 TABLET ORAL at 09:08

## 2017-07-18 RX ADMIN — HEPARIN SODIUM SCH UNIT: 5000 INJECTION, SOLUTION INTRAVENOUS; SUBCUTANEOUS at 13:32

## 2017-07-18 RX ADMIN — FLUTICASONE PROPIONATE AND SALMETEROL SCH INH: 50; 250 POWDER RESPIRATORY (INHALATION) at 09:05

## 2017-07-18 RX ADMIN — CLOPIDOGREL BISULFATE SCH MG: 75 TABLET, FILM COATED ORAL at 09:09

## 2017-07-18 RX ADMIN — DOCUSATE SODIUM SCH MG: 100 CAPSULE, LIQUID FILLED ORAL at 22:19

## 2017-07-18 RX ADMIN — DULOXETINE SCH MG: 30 CAPSULE, DELAYED RELEASE ORAL at 09:07

## 2017-07-18 RX ADMIN — FAMOTIDINE SCH MG: 20 TABLET, FILM COATED ORAL at 22:18

## 2017-07-18 RX ADMIN — SULFASALAZINE SCH MG: 500 TABLET, DELAYED RELEASE ORAL at 09:07

## 2017-07-18 RX ADMIN — SULFASALAZINE SCH MG: 500 TABLET, DELAYED RELEASE ORAL at 13:33

## 2017-07-18 RX ADMIN — METOPROLOL SUCCINATE SCH MG: 50 TABLET, EXTENDED RELEASE ORAL at 13:32

## 2017-07-18 RX ADMIN — FLUTICASONE PROPIONATE SCH SPRAY: 50 SPRAY, METERED NASAL at 09:06

## 2017-07-18 RX ADMIN — DOCUSATE SODIUM SCH MG: 100 CAPSULE, LIQUID FILLED ORAL at 09:20

## 2017-07-18 RX ADMIN — DULOXETINE SCH MG: 30 CAPSULE, DELAYED RELEASE ORAL at 22:18

## 2017-07-18 RX ADMIN — MONTELUKAST SODIUM SCH MG: 10 TABLET, FILM COATED ORAL at 18:05

## 2017-07-18 RX ADMIN — Medication SCH ML: at 22:19

## 2017-07-18 RX ADMIN — Medication SCH ML: at 13:34

## 2017-07-18 RX ADMIN — FLUTICASONE PROPIONATE AND SALMETEROL SCH INH: 50; 250 POWDER RESPIRATORY (INHALATION) at 22:22

## 2017-07-18 RX ADMIN — Medication SCH ML: at 05:42

## 2017-07-18 RX ADMIN — PRENATAL W/O A VIT W/ FE FUMARATE-FA CAP 106.5-1 MG SCH CAP: 106.5 CAPSULE ORAL at 09:14

## 2017-07-18 RX ADMIN — SULFASALAZINE SCH MG: 500 TABLET, DELAYED RELEASE ORAL at 18:05

## 2017-07-18 RX ADMIN — HEPARIN SODIUM SCH UNIT: 5000 INJECTION, SOLUTION INTRAVENOUS; SUBCUTANEOUS at 22:19

## 2017-07-18 RX ADMIN — FOLIC ACID SCH MG: 1 TABLET ORAL at 09:09

## 2017-07-18 RX ADMIN — PREGABALIN SCH MG: 75 CAPSULE ORAL at 22:18

## 2017-07-18 RX ADMIN — INSULIN LISPRO SCH UNIT: 100 INJECTION, SOLUTION INTRAVENOUS; SUBCUTANEOUS at 11:50

## 2017-07-18 RX ADMIN — INSULIN LISPRO SCH UNIT: 100 INJECTION, SOLUTION INTRAVENOUS; SUBCUTANEOUS at 17:43

## 2017-07-18 RX ADMIN — PREGABALIN SCH MG: 75 CAPSULE ORAL at 09:10

## 2017-07-18 RX ADMIN — INSULIN LISPRO SCH UNIT: 100 INJECTION, SOLUTION INTRAVENOUS; SUBCUTANEOUS at 09:05

## 2017-07-18 RX ADMIN — ATORVASTATIN CALCIUM SCH MG: 20 TABLET, FILM COATED ORAL at 22:19
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
## 2017-07-19 VITALS — SYSTOLIC BLOOD PRESSURE: 163 MMHG | DIASTOLIC BLOOD PRESSURE: 67 MMHG

## 2017-07-19 RX ADMIN — DOCUSATE SODIUM SCH MG: 100 CAPSULE, LIQUID FILLED ORAL at 10:24

## 2017-07-19 RX ADMIN — Medication SCH ML: at 06:23

## 2017-07-19 RX ADMIN — INSULIN LISPRO SCH UNIT: 100 INJECTION, SOLUTION INTRAVENOUS; SUBCUTANEOUS at 10:25

## 2017-07-19 RX ADMIN — HEPARIN SODIUM SCH UNIT: 5000 INJECTION, SOLUTION INTRAVENOUS; SUBCUTANEOUS at 13:05

## 2017-07-19 RX ADMIN — DULOXETINE SCH MG: 30 CAPSULE, DELAYED RELEASE ORAL at 10:23

## 2017-07-19 RX ADMIN — SULFASALAZINE SCH MG: 500 TABLET, DELAYED RELEASE ORAL at 12:46

## 2017-07-19 RX ADMIN — FOLIC ACID SCH MG: 1 TABLET ORAL at 10:24

## 2017-07-19 RX ADMIN — MORPHINE SULFATE SCH MG: 15 TABLET, EXTENDED RELEASE ORAL at 10:24

## 2017-07-19 RX ADMIN — HEPARIN SODIUM SCH UNIT: 5000 INJECTION, SOLUTION INTRAVENOUS; SUBCUTANEOUS at 06:22

## 2017-07-19 RX ADMIN — SULFASALAZINE SCH MG: 500 TABLET, DELAYED RELEASE ORAL at 10:24

## 2017-07-19 RX ADMIN — METOPROLOL SUCCINATE SCH MG: 50 TABLET, EXTENDED RELEASE ORAL at 12:46

## 2017-07-19 RX ADMIN — CETIRIZINE HYDROCHLORIDE SCH MG: 10 TABLET, FILM COATED ORAL at 10:24

## 2017-07-19 RX ADMIN — FLUTICASONE PROPIONATE AND SALMETEROL SCH INH: 50; 250 POWDER RESPIRATORY (INHALATION) at 10:24

## 2017-07-19 RX ADMIN — Medication SCH ML: at 13:05

## 2017-07-19 RX ADMIN — AMLODIPINE BESYLATE SCH MG: 10 TABLET ORAL at 10:24

## 2017-07-19 RX ADMIN — PRENATAL W/O A VIT W/ FE FUMARATE-FA CAP 106.5-1 MG SCH CAP: 106.5 CAPSULE ORAL at 10:23

## 2017-07-19 RX ADMIN — FLUTICASONE PROPIONATE SCH SPRAY: 50 SPRAY, METERED NASAL at 10:25

## 2017-07-19 RX ADMIN — CLOPIDOGREL BISULFATE SCH MG: 75 TABLET, FILM COATED ORAL at 10:24

## 2017-07-19 RX ADMIN — PREGABALIN SCH MG: 75 CAPSULE ORAL at 10:24

## 2017-07-19 RX ADMIN — INSULIN LISPRO SCH UNIT: 100 INJECTION, SOLUTION INTRAVENOUS; SUBCUTANEOUS at 12:41

## 2017-07-19 RX ADMIN — INSULIN LISPRO SCH UNIT: 100 INJECTION, SOLUTION INTRAVENOUS; SUBCUTANEOUS at 15:33

## 2017-07-19 NOTE — PDOC DISCHARGE SUMMARY
General





- Admit/Disc Date/PCP


Admission Date/Primary Care Provider: 


  07/14/17 18:05





  KAYLEY CHILDS PA-C





Discharge Date: 07/19/17





- Discharge Diagnosis


(1) ANNY (acute kidney injury)


Is this a current diagnosis for this admission?: Yes





(2) Acute respiratory failure with hypercapnia


Is this a current diagnosis for this admission?: Yes





(3) Anemia


Is this a current diagnosis for this admission?: Yes





(4) Diabetes


Is this a current diagnosis for this admission?: Yes





(5) Encephalopathy acute


Is this a current diagnosis for this admission?: Yes





(6) HTN (hypertension)


Is this a current diagnosis for this admission?: Yes





(7) Obesity hypoventilation syndrome


Is this a current diagnosis for this admission?: Yes





(8) Morbid obesity with BMI of 50.0-59.9, adult


Is this a current diagnosis for this admission?: Yes





(9) Polypharmacy


Is this a current diagnosis for this admission?: Yes





(10) Diastolic CHF


Is this a current diagnosis for this admission?: Yes





(11) GOSIA (obstructive sleep apnea)


Is this a current diagnosis for this admission?: Yes





(12) Opiate dependence, continuous


Is this a current diagnosis for this admission?: Yes





(13) Rheumatoid arthritis


Is this a current diagnosis for this admission?: Yes





(14) Non compliance with medical treatment


Is this a current diagnosis for this admission?: Yes








- Additional Information


Resuscitation Status: Full Code


Discharge Diet: Cardiac, Diabetic


Discharge Activity: Activity As Tolerated, Slowly Increase Activity, Supervised 

Activity, Walk Frequently, Weigh Daily


Home Medications: 








Albuterol Sulfate [Ventolin Hfa] 2 puff IH Q6 07/14/17 


Amlodipine Besylate [Norvasc 10 mg Tablet] 10 mg PO DAILY 07/14/17 


Atorvastatin Calcium [Lipitor 20 mg Tablet] 20 mg PO QHS 07/14/17 


Celecoxib [Celebrex 200 mg Capsule] 200 mg PO BIDPCBS 07/14/17 


Cetirizine HCl [Zyrtec 10 mg Tablet] 10 mg PO DAILY 07/14/17 


Cevimeline HCl [Evoxac] 30 mg PO Q8 07/14/17 


Clopidogrel Bisulfate [Plavix 75 mg Tablet] 75 mg PO DAILY 07/14/17 


Docusate Sodium [Dok] 100 mg PO Q12 07/14/17 


Duloxetine HCl [Cymbalta] 60 mg PO Q12 07/14/17 


Ergocalciferol (Vitamin D2) [Vitamin D2] 50,000 unit PO MO@1000 07/14/17 


Famotidine [Pepcid 40 mg Tablet] 40 mg PO QHS 07/14/17 


Fluticasone Propionate [Flonase Nasal Spray 50 Mcg/Spray 16 gm] 1 spray NASL 

Q12 07/14/17 


Fluticasone/Salmeterol [Advair 250-50 Diskus 28 dose] 1 puff IH Q12 07/14/17 


Folic Acid [Folvite 1 mg Tablet] 1 mg PO DAILY 07/14/17 


Lisinopril [Zestril] 5 mg PO DAILY 07/14/17 


Metformin HCl [Glucophage] 1,000 mg PO BIDBS 07/14/17 


Methotrexate Sodium [Methotrexate] 15 mg PO TH@1000 07/14/17 


Metoprolol Succinate [Toprol  mg Tablet] 100 mg PO Q12 07/14/17 


Montelukast Sodium [Singulair 10 mg Tablet] 10 mg PO QPM 07/14/17 


Morphine Sulfate [Morphine Ir 15 mg Tablet] 15 mg PO Q6HP PRN 07/14/17 


Mupirocin 1 applic NASL ASDIR PRN 07/14/17 


Pnv with Ca,No.72/Iron/FA [Pnv Prenatal Plus Multivit Tab] 1 tab PO DAILY 07/14/ 17 


Pregabalin [Lyrica] 150 mg PO Q12 07/14/17 


Spironolactone [Aldactone 25 mg Tablet] 25 mg PO DAILY 07/14/17 


Sulfasalazine [Sulfazine] 500 mg PO MEALS 07/14/17 


Furosemide [Lasix 80 mg Tablet] 40 mg PO DAILY #30 tablet 07/19/17 











History of Present Illness


History of Present Illness: 


KALIE DANIEL is a 56 year old female past medical history fibromyalgia, 

rheumatoid arthritis (on Morphine 100 mg bid), hypertension, COPD, GOSIA (on home 

CPAP), presents by EMS after she was found tired after she took her 100 mg 

morphine earlier today at 13:00 by her home health nurse.  Patient was 

discharged from [facility] 3 days ago after an extended stay at 

[facility] for polypharmacy, possible TIA/CVA and C. diff 

infection.  Patient is somnolent, but will wake up to voice and interact.  

Patient has no complaints at this time.








Hospital Course


Hospital Course: 


Initially admitted to the ICU for close monitoring and her overall 

encephalopathy was felt to be secondary to her high-dose opiate analgesics as 

well as failure to use her CPAP.  Patient did respond to Narcan in the 

emergency department.  Patient was found to have an acute kidney injury with a 

creatinine of 2.94 which was back to 0.87 by discharge. This was felt to be 

secondary to over-diuresis.  Her diuretics and ACE inhibitor were held at that 

time.  Patient gradually recovered back to baseline.  On day of discharge 

patient was awake, alert, and oriented 3.  Restarted on a small amount of 

morphine sulfate with improvement of her overall symptomatology.  I personally 

called her pain management physician Dr. Figueroa, and discussed with him my 

concerns as this is the second time I have cared for this patient with a very 

similar presentation.  Advised patient that she is to follow-up with Dr. Figueroa, 

and her primary care physician within the coming week to discuss her pain 

management regimen.  Patient is overall doing well and requesting discharge.  

Tee was able to establish a CPAP at home for patient prior to discharge.





Physical Exam


Vital Signs: 


 











Temp Pulse Resp BP Pulse Ox


 


 98.3 F   81   18   163/67 H  98 


 


 07/19/17 11:29  07/19/17 11:29  07/19/17 11:29  07/19/17 11:29  07/19/17 11:29








 Intake & Output











 07/18/17 07/19/17 07/20/17





 06:59 06:59 06:59


 


Intake Total 860 747 200


 


Output Total 1150  


 


Balance -290 747 200


 


Weight 140.6 kg 141.8 kg 











Exam: 


GENERAL: No acute distress, morbidly obese


HEENT: Conjunctiva clear, nonicteric, moist mucous membranes, no JVD, midline 

trachea


RESPIRATORY: Clear to auscultation bilaterally, no wheezes, no rhonchi


CARDIAC: Regular rate and rhythm, no murmurs/gallops/rubs


ABDOMEN: Soft, nondistended, nontender, positive bowel sounds, no rebound, no 

guarding


EXTREMETIES: No edema, cyanosis, clubbing


NEUROLOGIC: Awake, oriented to person/place/time, CN's grossly intact,  no 

focal deficits


SKIN: No rash, wounds


PSYCH: Flat affect





Results


Laboratory Results: 


 





 07/17/17 04:52 





 07/17/17 04:52 








Impressions: 


 





Chest X-Ray  07/15/17 06:00


IMPRESSION:  NO ACUTE RADIOGRAPHIC FINDING IN THE CHEST.


 














Qualifiers


**PATEINT BEING DISCHARGED WITH ANY OF THE FOLLOWING DIAGNOSIS?: No





Plan


Time Spent: Greater than 30 Minutes

## 2018-04-09 ENCOUNTER — HOSPITAL ENCOUNTER (INPATIENT)
Dept: HOSPITAL 62 - ER | Age: 58
LOS: 3 days | Discharge: HOME | DRG: 552 | End: 2018-04-12
Attending: INTERNAL MEDICINE | Admitting: INTERNAL MEDICINE
Payer: MEDICARE

## 2018-04-09 DIAGNOSIS — Z79.899: ICD-10-CM

## 2018-04-09 DIAGNOSIS — M48.061: Primary | ICD-10-CM

## 2018-04-09 DIAGNOSIS — G43.809: ICD-10-CM

## 2018-04-09 DIAGNOSIS — R20.0: ICD-10-CM

## 2018-04-09 DIAGNOSIS — J44.9: ICD-10-CM

## 2018-04-09 DIAGNOSIS — Z86.73: ICD-10-CM

## 2018-04-09 DIAGNOSIS — R51: ICD-10-CM

## 2018-04-09 DIAGNOSIS — R53.1: ICD-10-CM

## 2018-04-09 DIAGNOSIS — M48.02: ICD-10-CM

## 2018-04-09 DIAGNOSIS — Z79.84: ICD-10-CM

## 2018-04-09 DIAGNOSIS — E11.9: ICD-10-CM

## 2018-04-09 DIAGNOSIS — Z90.710: ICD-10-CM

## 2018-04-09 DIAGNOSIS — G47.33: ICD-10-CM

## 2018-04-09 DIAGNOSIS — M32.9: ICD-10-CM

## 2018-04-09 DIAGNOSIS — E66.01: ICD-10-CM

## 2018-04-09 DIAGNOSIS — M79.7: ICD-10-CM

## 2018-04-09 DIAGNOSIS — I10: ICD-10-CM

## 2018-04-09 DIAGNOSIS — R00.0: ICD-10-CM

## 2018-04-09 LAB
ADD MANUAL DIFF: NO
ALBUMIN SERPL-MCNC: 4.2 G/DL (ref 3.5–5)
ALP SERPL-CCNC: 75 U/L (ref 38–126)
ALT SERPL-CCNC: 35 U/L (ref 9–52)
ANION GAP SERPL CALC-SCNC: 9 MMOL/L (ref 5–19)
APPEARANCE UR: (no result)
APTT BLD: 23.9 SEC (ref 23.5–35.8)
APTT PPP: YELLOW S
AST SERPL-CCNC: 18 U/L (ref 14–36)
BARBITURATES UR QL SCN: NEGATIVE
BASOPHILS # BLD AUTO: 0.1 10^3/UL (ref 0–0.2)
BASOPHILS NFR BLD AUTO: 1 % (ref 0–2)
BILIRUB DIRECT SERPL-MCNC: 0.1 MG/DL (ref 0–0.4)
BILIRUB SERPL-MCNC: 0.3 MG/DL (ref 0.2–1.3)
BILIRUB UR QL STRIP: NEGATIVE
BUN SERPL-MCNC: 14 MG/DL (ref 7–20)
CALCIUM: 10.5 MG/DL (ref 8.4–10.2)
CHLORIDE SERPL-SCNC: 102 MMOL/L (ref 98–107)
CK MB SERPL-MCNC: < 0.22 NG/ML (ref ?–4.55)
CK SERPL-CCNC: 21 U/L (ref 30–135)
CO2 SERPL-SCNC: 30 MMOL/L (ref 22–30)
EOSINOPHIL # BLD AUTO: 0 10^3/UL (ref 0–0.6)
EOSINOPHIL NFR BLD AUTO: 0.1 % (ref 0–6)
ERYTHROCYTE [DISTWIDTH] IN BLOOD BY AUTOMATED COUNT: 17.7 % (ref 11.5–14)
GLUCOSE SERPL-MCNC: 261 MG/DL (ref 75–110)
GLUCOSE UR STRIP-MCNC: >=500 MG/DL
HCT VFR BLD CALC: 37.4 % (ref 36–47)
HGB BLD-MCNC: 12.1 G/DL (ref 12–15.5)
INR PPP: 0.92
KETONES UR STRIP-MCNC: NEGATIVE MG/DL
LYMPHOCYTES # BLD AUTO: 1.4 10^3/UL (ref 0.5–4.7)
LYMPHOCYTES NFR BLD AUTO: 15.4 % (ref 13–45)
MCH RBC QN AUTO: 24.7 PG (ref 27–33.4)
MCHC RBC AUTO-ENTMCNC: 32.3 G/DL (ref 32–36)
MCV RBC AUTO: 76 FL (ref 80–97)
METHADONE UR QL SCN: NEGATIVE
MONOCYTES # BLD AUTO: 0.4 10^3/UL (ref 0.1–1.4)
MONOCYTES NFR BLD AUTO: 4.4 % (ref 3–13)
NEUTROPHILS # BLD AUTO: 7.1 10^3/UL (ref 1.7–8.2)
NEUTS SEG NFR BLD AUTO: 79.1 % (ref 42–78)
NITRITE UR QL STRIP: NEGATIVE
PCP UR QL SCN: NEGATIVE
PH UR STRIP: 6 [PH] (ref 5–9)
PLATELET # BLD: 304 10^3/UL (ref 150–450)
POTASSIUM SERPL-SCNC: 4.3 MMOL/L (ref 3.6–5)
PROT SERPL-MCNC: 7 G/DL (ref 6.3–8.2)
PROT UR STRIP-MCNC: NEGATIVE MG/DL
PROTHROMBIN TIME: 12.9 SEC (ref 11.4–15.4)
RBC # BLD AUTO: 4.9 10^6/UL (ref 3.72–5.28)
SODIUM SERPL-SCNC: 141.2 MMOL/L (ref 137–145)
SP GR UR STRIP: 1.02
TOTAL CELLS COUNTED % (AUTO): 100 %
TROPONIN I SERPL-MCNC: < 0.012 NG/ML
URINE AMPHETAMINES SCREEN: NEGATIVE
URINE BENZODIAZEPINES SCREEN: NEGATIVE
URINE COCAINE SCREEN: NEGATIVE
URINE MARIJUANA (THC) SCREEN: NEGATIVE
UROBILINOGEN UR-MCNC: NEGATIVE MG/DL (ref ?–2)
WBC # BLD AUTO: 9 10^3/UL (ref 4–10.5)

## 2018-04-09 PROCEDURE — 70450 CT HEAD/BRAIN W/O DYE: CPT

## 2018-04-09 PROCEDURE — 85730 THROMBOPLASTIN TIME PARTIAL: CPT

## 2018-04-09 PROCEDURE — 83735 ASSAY OF MAGNESIUM: CPT

## 2018-04-09 PROCEDURE — 36415 COLL VENOUS BLD VENIPUNCTURE: CPT

## 2018-04-09 PROCEDURE — 83036 HEMOGLOBIN GLYCOSYLATED A1C: CPT

## 2018-04-09 PROCEDURE — 82962 GLUCOSE BLOOD TEST: CPT

## 2018-04-09 PROCEDURE — 84484 ASSAY OF TROPONIN QUANT: CPT

## 2018-04-09 PROCEDURE — 80053 COMPREHEN METABOLIC PANEL: CPT

## 2018-04-09 PROCEDURE — 96375 TX/PRO/DX INJ NEW DRUG ADDON: CPT

## 2018-04-09 PROCEDURE — 80048 BASIC METABOLIC PNL TOTAL CA: CPT

## 2018-04-09 PROCEDURE — 96374 THER/PROPH/DIAG INJ IV PUSH: CPT

## 2018-04-09 PROCEDURE — G0378 HOSPITAL OBSERVATION PER HR: HCPCS

## 2018-04-09 PROCEDURE — 93010 ELECTROCARDIOGRAM REPORT: CPT

## 2018-04-09 PROCEDURE — 85652 RBC SED RATE AUTOMATED: CPT

## 2018-04-09 PROCEDURE — 81001 URINALYSIS AUTO W/SCOPE: CPT

## 2018-04-09 PROCEDURE — 72141 MRI NECK SPINE W/O DYE: CPT

## 2018-04-09 PROCEDURE — 71045 X-RAY EXAM CHEST 1 VIEW: CPT

## 2018-04-09 PROCEDURE — 99285 EMERGENCY DEPT VISIT HI MDM: CPT

## 2018-04-09 PROCEDURE — 70553 MRI BRAIN STEM W/O & W/DYE: CPT

## 2018-04-09 PROCEDURE — 93880 EXTRACRANIAL BILAT STUDY: CPT

## 2018-04-09 PROCEDURE — 85025 COMPLETE CBC W/AUTO DIFF WBC: CPT

## 2018-04-09 PROCEDURE — 85610 PROTHROMBIN TIME: CPT

## 2018-04-09 PROCEDURE — 72148 MRI LUMBAR SPINE W/O DYE: CPT

## 2018-04-09 PROCEDURE — 82550 ASSAY OF CK (CPK): CPT

## 2018-04-09 PROCEDURE — 93005 ELECTROCARDIOGRAM TRACING: CPT

## 2018-04-09 PROCEDURE — 82553 CREATINE MB FRACTION: CPT

## 2018-04-09 PROCEDURE — 80307 DRUG TEST PRSMV CHEM ANLYZR: CPT

## 2018-04-09 NOTE — RADIOLOGY REPORT (SQ)
EXAM DESCRIPTION:  CT HEAD WITHOUT



COMPLETED DATE/TIME:  4/9/2018 5:21 pm



REASON FOR STUDY:  stroke s/s



COMPARISON:  6/18/2017.



TECHNIQUE:  Axial images acquired through the brain without intravenous contrast.  Images reviewed wi
th bone, brain and subdural windows.  Additional sagittal and coronal reconstructions were generated.
 Images stored on PACS.

All CT scanners at this facility use dose modulation, iterative reconstruction, and/or weight based d
osing when appropriate to reduce radiation dose to as low as reasonably achievable (ALARA).

[facility]: Dose Right  [facility]: CareDose    [facility]: Dose Right    [facility]: Teradose 4D    [facility]: Smart TrueAccord



RADIATION DOSE:  CT Rad equipment meets quality standard of care and radiation dose reduction techniq
ues were employed. CTDIvol: 53.2 mGy. DLP: 1044 mGy-cm. mGy.



LIMITATIONS:  None.



FINDINGS:  VENTRICLES: Normal size and contour.

CEREBRUM: No masses.  No hemorrhage.  No midline shift.  No evidence for acute infarction. Normal gra
y/white matter differentiation. No areas of low density in the white matter.

CEREBELLUM: No masses.  No hemorrhage.  No alteration of density.  No evidence for acute infarction.

EXTRAAXIAL SPACES: No hemorrhage or mass.  Mild atrophy.

ORBITS AND GLOBE: No intra- or extraconal masses.  Normal contour of globe without masses.

CALVARIUM: No fracture.

PARANASAL SINUSES: No fluid or mucosal thickening.

SOFT TISSUES: No mass or hematoma.

OTHER: No other significant finding.



IMPRESSION:  No acute intracranial abnormality.

 Pertinent positive or negative findings of the imaging study reported as a CRITICAL EXAM to ER PROVI
MAJOR  at17:25 on 4/9/2018.

Category of Critical Exam: Stroke alert

EVIDENCE OF ACUTE STROKE: NO.



COMMENT:  Quality ID # 436: Final reports with documentation of one or more dose reduction techniques
 (e.g., Automated exposure control, adjustment of the mA and/or kV according to patient size, use of 
iterative reconstruction technique)



TECHNICAL DOCUMENTATION:  JOB ID:  [id number]

 2011 [employer]- All Rights Reserved



Reading location - IP/workstation name: [email or web address]

## 2018-04-09 NOTE — RADIOLOGY REPORT (SQ)
EXAM DESCRIPTION:

MRI HEAD COMBO



CLINICAL HISTORY:

57 years Female, stroke



COMPARISON:

None.



TECHNIQUE:

Conventional 20 mL MultiHance gadolinium contrast MRI.



FINDINGS:

Brain parenchyma appears unremarkable. No evidence of ischemia or

infarct. No MRI evidence of hemorrhage. No restricted diffusion.

No GRE artifact. No enhancement defect. No mass, mass effect, or

midline shift. Extra-axial structures are unremarkable.



IMPRESSION:

Normal contrast MRI of the brain.

## 2018-04-09 NOTE — PDOC H&P
History of Present Illness


Admission Date/PCP: 


  





  KAYLEY CHILDS PA-C





History of Present Illness: 


KALIE DANIEL is a 57 year old black female patient with multiple comorbidities 

including COPD, GOSIA,, HTN, HLD, morbid obesity, fibromyalgia, diabetes mellitus

, lupus and prior history of TIA/stroke, presented with chief complaint of 

weakness involving the left upper and lower extremities.  Patient reports that 

she was in her baseline state of health up until one day when she starts to 

have :"Bad headache" followed by tingling sensation and numbness and inability 

to move her left upper and lower extremities.  Her initial blood work, urine 

drug screen and CT of the head are unremarkable.  When I examined the patient 

she is able to elevate her left upper and lower extremity against gravity.  She 

does not have pronation drift.  Patient denies chills, fever, chest pain, 

palpitation, diaphoresis, nausea, vomiting, diarrhea or any urinary complaints.

  Has headache but denies dizziness or blurring of vision.  No seizure 

activities.  No dyspnea or osteopenia.








Past Medical History


Cardiac Medical History: Reports: Hyperlipidema, Hypertension, Heart Murmur -  systolic murmur


   Denies: Atrial Fibrillation, Congestive Heart Failure, Coronary Artery 

Disease, Myocardial Infarction, Peripheral Vascular Disease, Pulmonary Embolism


Pulmonary Medical History: Reports: Asthma, Bronchitis, Pneumonia, Sleep Apnea


   Denies: Chronic Obstructive Pulmonary Disease (COPD), Respiratory Failure, 

Tuberculosis


Neurological Medical History: 


   Denies: Seizures


Endocrine Medical History: Reports: Diabetes Mellitus Type 2


   Denies: Hyperthyroidism, Hypothyroidism


Malignancy Medical History: 


   Denies: Leukemia, Lung Cancer


GI Medical History: Reports: Crohn's Disease, Hiatal Hernia


   Denies: Gastroesophageal Reflux Disease


Musculoskeltal Medical History: Reports: Arthritis, Fibromyalgia


Psychiatric Medical History: Reports: Depression, Personality Disorder


   Denies: Bipolar Disorder, Dementia, Post Traumatic Stress Disorder


Hematology: Reports: Anemia


   Denies: Hemophilia, Sickle Cell Disease


Infectious Medical History: 


   Denies: HIV





Past Surgical History


Past Surgical History: Reports:  Section, Hysterectomy


   Denies: Amputation, Appendectomy, Cholecystectomy, Colostomy, Coronary 

Artery Bypass Graft, Gastric Bypass Surgery, Herniorrhaphy, Mastectomy, 

Pacemaker, Tonsillectomy, Tubal Ligation





Social History


Smoking Status: Never Smoker


Frequency of Alcohol Use: None


Hx Recreational Drug Use: No


Drugs: None


Hx Prescription Drug Abuse: Yes - 2016





Family History


Family History: CAD, DM, Hypertension


Parental Family History Reviewed: Yes


Children Family History Reviewed: Yes


Sibling(s) Family History Reviewed.: Yes





Medication/Allergy


Home Medications: 








Albuterol Sulfate [Ventolin Hfa] 2 puff IH Q6 17 


Amlodipine Besylate [Norvasc 10 mg Tablet] 10 mg PO DAILY 17 


Atorvastatin Calcium [Lipitor 20 mg Tablet] 20 mg PO QHS 17 


Celecoxib [Celebrex 200 mg Capsule] 200 mg PO BIDPCBS 17 


Cetirizine HCl [Zyrtec 10 mg Tablet] 10 mg PO DAILY 17 


Cevimeline HCl [Evoxac] 30 mg PO Q8 17 


Clopidogrel Bisulfate [Plavix 75 mg Tablet] 75 mg PO DAILY 17 


Docusate Sodium [Dok] 100 mg PO Q12 17 


Duloxetine HCl [Cymbalta] 60 mg PO Q12 17 


Ergocalciferol (Vitamin D2) [Vitamin D2] 50,000 unit PO MO@1000 17 


Famotidine [Pepcid 40 mg Tablet] 40 mg PO QHS 17 


Fluticasone Propionate [Flonase Nasal Spray 50 Mcg/Spray 16 gm] 1 spray NASL 

Q12 17 


Fluticasone/Salmeterol [Advair 250-50 Diskus 28 dose] 1 puff IH Q12 17 


Folic Acid [Folvite 1 mg Tablet] 1 mg PO DAILY 17 


Lisinopril [Zestril] 5 mg PO DAILY 17 


Metformin HCl [Glucophage] 1,000 mg PO BIDBS 17 


Methotrexate Sodium [Methotrexate] 15 mg PO TH@1000 17 


Metoprolol Succinate [Toprol  mg Tablet] 100 mg PO Q12 17 


Montelukast Sodium [Singulair 10 mg Tablet] 10 mg PO QPM 17 


Morphine Sulfate [Morphine Ir 15 mg Tablet] 15 mg PO Q6HP PRN 17 


Mupirocin 1 applic NASL ASDIR PRN 17 


Pnv,Calcium 72/Iron/Folic Acid [Pnv Prenatal Plus Multivit Tab] 1 tab PO DAILY 

17 


Pregabalin [Lyrica] 150 mg PO Q12 17 


Spironolactone [Aldactone 25 mg Tablet] 25 mg PO DAILY 17 


Sulfasalazine [Sulfazine] 500 mg PO MEALS 17 


Furosemide [Lasix 80 mg Tablet] 40 mg PO DAILY #30 tablet 17 








Allergies/Adverse Reactions: 


 





ibuprofen [Ibuprofen] Allergy (Severe, Verified 17 00:36)


 Chest pain











Review of Systems


Constitutional: PRESENT: as per HPI


Eyes: PRESENT: as per HPI


Cardiovascular: PRESENT: as per HPI


Respiratory: PRESENT: as per HPI


Gastrointestinal: PRESENT: as per HPI


Musculoskeletal: PRESENT: as per HPI


Integumentary: PRESENT: as per HPI





Physical Exam


Vital Signs: 


 











Temp Pulse Resp BP Pulse Ox


 


 97.9 F   87   14   168/147 H  100 


 


 18 17:25  18 18:00  18 20:01  18 20:01  18 20:01








 Intake & Output











 04/08/18 04/09/18 04/10/18





 06:59 06:59 06:59


 


Weight   142.8 kg











General appearance: PRESENT: no acute distress, morbidly obese


Head exam: PRESENT: atraumatic, normocephalic


Eye exam: PRESENT: conjunctiva pink, EOMI, PERRLA.  ABSENT: scleral icterus


Respiratory exam: PRESENT: clear to auscultation kaley.  ABSENT: rales, rhonchi, 

wheezes


Cardiovascular exam: PRESENT: RRR.  ABSENT: diastolic murmur, rubs, systolic 

murmur


Neurological exam: PRESENT: alert, CN II-XII grossly intact - Patient able to 

lift her upper and lower extremities on the left side against gravity, motor 

sensory deficit





Results


Laboratory Results: 


 





 18 17:50 





 18 17:50 





 











  18





  17:50 17:50 19:50


 


WBC  9.0  


 


RBC  4.90  


 


Hgb  12.1  


 


Hct  37.4  


 


MCV  76 L  


 


MCH  24.7 L  


 


MCHC  32.3  


 


RDW  17.7 H  


 


Plt Count  304  


 


Seg Neutrophils %  79.1 H  


 


Lymphocytes %  15.4  


 


Monocytes %  4.4  


 


Eosinophils %  0.1  


 


Basophils %  1.0  


 


Absolute Neutrophils  7.1  


 


Absolute Lymphocytes  1.4  


 


Absolute Monocytes  0.4  


 


Absolute Eosinophils  0.0  


 


Absolute Basophils  0.1  


 


Sodium   141.2 


 


Potassium   4.3 


 


Chloride   102 


 


Carbon Dioxide   30 


 


Anion Gap   9 


 


BUN   14 


 


Creatinine   0.66 


 


Est GFR ( Amer)   > 60 


 


Est GFR (Non-Af Amer)   > 60 


 


Glucose   261 H 


 


Calcium   10.5 H 


 


Total Bilirubin   0.3 


 


AST   18 


 


ALT   35 


 


Alkaline Phosphatase   75 


 


Total Protein   7.0 


 


Albumin   4.2 


 


Urine Color    YELLOW


 


Urine Appearance    SLIGHTLY-CLOUDY


 


Urine pH    6.0


 


Ur Specific Gravity    1.022


 


Urine Protein    NEGATIVE


 


Urine Glucose (UA)    >=500 H


 


Urine Ketones    NEGATIVE


 


Urine Blood    NEGATIVE


 


Urine Nitrite    NEGATIVE


 


Ur Leukocyte Esterase    TRACE H


 


Urine WBC (Auto)    2


 


Urine RBC (Auto)    1








 











  18





  17:50 17:50


 


Creatine Kinase  21 L 


 


CK-MB (CK-2)   < 0.22


 


Troponin I   < 0.012











Impressions: 


 





Chest X-Ray  18 17:09


IMPRESSION:  NO SIGNIFICANT RADIOGRAPHIC FINDING IN THE CHEST.


 








Head CT  18 17:09


IMPRESSION:  No acute intracranial abnormality.


 Pertinent positive or negative findings of the imaging study reported as a 

CRITICAL EXAM to ER PROVIDER  at17:25 on 2018.


Category of Critical Exam: Stroke alert


EVIDENCE OF ACUTE STROKE: NO.


 














Assessment & Plan





- Diagnosis


(1) Stroke


Qualifiers: 


   CVA mechanism: unspecified   Qualified Code(s): I63.9 - Cerebral infarction, 

unspecified   


Is this a current diagnosis for this admission?: Yes   


Plan: 


Since he has multiple risk factor for stroke will go ahead and request MRI of 

the brain and am.


In the meantime we will put her on Plavix since patient is allergic to aspirin.

  And continue her metoprolol and other antihypertensive medications.


We will allow permissive hypertension blood pressure of 220/120











(3) Hypertension


Qualifiers: 


   Hypertension type: essential hypertension   Qualified Code(s): I10 - 

Essential (primary) hypertension   


Is this a current diagnosis for this admission?: Yes   


Plan: 


Continue her home medication.  Monitor her blood pressure








(4) Hyperlipidemia


Qualifiers: 


   Hyperlipidemia type: unspecified   Qualified Code(s): E78.5 - Hyperlipidemia

, unspecified   


Is this a current diagnosis for this admission?: Yes   


Plan: 


Increase the dose of her Lipitor to 40 mg p.o. nightly.








(5) GOSIA (obstructive sleep apnea)


Is this a current diagnosis for this admission?: Yes   


Plan: 


We will put her on CPAP.








(6) COPD (chronic obstructive pulmonary disease)


Qualifiers: 


   COPD type: unspecified COPD   Qualified Code(s): J44.9 - Chronic obstructive 

pulmonary disease, unspecified   


Is this a current diagnosis for this admission?: Yes   


Plan: 


Patient is not shortness of breath or any wheezing.


We will put her on as needed DuoNeb.








- Time


Critical Time spent with patient: 25-34 minutes


Within: within 48 hours

## 2018-04-09 NOTE — RADIOLOGY REPORT (SQ)
EXAM DESCRIPTION:  CHEST SINGLE VIEW



COMPLETED DATE/TIME:  4/9/2018 5:23 pm



REASON FOR STUDY:  stroke s/s



COMPARISON:  7/15/2017



NUMBER OF VIEWS:  One view.



TECHNIQUE:  Single frontal radiographic view of the chest acquired.



LIMITATIONS:  None.



FINDINGS:  LUNGS AND PLEURA: No opacities, masses or pneumothorax. No pleural effusion.

MEDIASTINUM AND HILAR STRUCTURES: No masses.  Contour normal.

HEART AND VASCULAR STRUCTURES: Heart normal in size.  Normal vasculature.

BONES: No acute findings.

HARDWARE: None in the chest.

OTHER: No other significant finding.



IMPRESSION:  NO SIGNIFICANT RADIOGRAPHIC FINDING IN THE CHEST.



TECHNICAL DOCUMENTATION:  JOB ID:  [id number]

 2011 [employer]- All Rights Reserved



Reading location - IP/workstation name: [email or web address]

## 2018-04-10 LAB
ANION GAP SERPL CALC-SCNC: 6 MMOL/L (ref 5–19)
BUN SERPL-MCNC: 13 MG/DL (ref 7–20)
CALCIUM: 9.6 MG/DL (ref 8.4–10.2)
CHLORIDE SERPL-SCNC: 104 MMOL/L (ref 98–107)
CO2 SERPL-SCNC: 32 MMOL/L (ref 22–30)
GLUCOSE SERPL-MCNC: 176 MG/DL (ref 75–110)
POTASSIUM SERPL-SCNC: 3.9 MMOL/L (ref 3.6–5)
SODIUM SERPL-SCNC: 141.9 MMOL/L (ref 137–145)

## 2018-04-10 RX ADMIN — CYCLOBENZAPRINE HYDROCHLORIDE SCH MG: 10 TABLET, FILM COATED ORAL at 13:52

## 2018-04-10 RX ADMIN — METFORMIN HYDROCHLORIDE SCH MG: 500 TABLET, FILM COATED ORAL at 08:04

## 2018-04-10 RX ADMIN — INSULIN LISPRO PRN UNIT: 100 INJECTION, SOLUTION INTRAVENOUS; SUBCUTANEOUS at 22:37

## 2018-04-10 RX ADMIN — ZOLPIDEM TARTRATE SCH MG: 5 TABLET ORAL at 00:05

## 2018-04-10 RX ADMIN — BUTALBITAL, ACETAMINOPHEN AND CAFFEINE PRN TAB: 50; 325; 40 TABLET ORAL at 17:36

## 2018-04-10 RX ADMIN — CYCLOBENZAPRINE HYDROCHLORIDE SCH MG: 10 TABLET, FILM COATED ORAL at 21:18

## 2018-04-10 RX ADMIN — AMLODIPINE BESYLATE SCH MG: 5 TABLET ORAL at 18:20

## 2018-04-10 RX ADMIN — CLOPIDOGREL BISULFATE SCH MG: 75 TABLET, FILM COATED ORAL at 09:59

## 2018-04-10 RX ADMIN — DOCUSATE SODIUM SCH MG: 100 CAPSULE, LIQUID FILLED ORAL at 17:35

## 2018-04-10 RX ADMIN — INSULIN DETEMIR SCH UNIT: 100 INJECTION, SOLUTION SUBCUTANEOUS at 22:38

## 2018-04-10 RX ADMIN — INSULIN LISPRO PRN UNIT: 100 INJECTION, SOLUTION INTRAVENOUS; SUBCUTANEOUS at 12:08

## 2018-04-10 RX ADMIN — INSULIN LISPRO PRN UNIT: 100 INJECTION, SOLUTION INTRAVENOUS; SUBCUTANEOUS at 17:49

## 2018-04-10 RX ADMIN — LANSOPRAZOLE SCH MG: 15 TABLET, ORALLY DISINTEGRATING, DELAYED RELEASE ORAL at 05:47

## 2018-04-10 RX ADMIN — HEPARIN SODIUM SCH UNIT: 5000 INJECTION, SOLUTION INTRAVENOUS; SUBCUTANEOUS at 21:17

## 2018-04-10 RX ADMIN — INSULIN LISPRO PRN UNIT: 100 INJECTION, SOLUTION INTRAVENOUS; SUBCUTANEOUS at 01:33

## 2018-04-10 RX ADMIN — FLUTICASONE PROPIONATE AND SALMETEROL SCH INH: 50; 250 POWDER RESPIRATORY (INHALATION) at 21:22

## 2018-04-10 RX ADMIN — ATORVASTATIN CALCIUM SCH MG: 40 TABLET, FILM COATED ORAL at 00:05

## 2018-04-10 RX ADMIN — METFORMIN HYDROCHLORIDE SCH MG: 500 TABLET, FILM COATED ORAL at 15:46

## 2018-04-10 RX ADMIN — DOCUSATE SODIUM SCH MG: 100 CAPSULE, LIQUID FILLED ORAL at 09:59

## 2018-04-10 RX ADMIN — ATORVASTATIN CALCIUM SCH MG: 40 TABLET, FILM COATED ORAL at 21:17

## 2018-04-10 RX ADMIN — HEPARIN SODIUM SCH UNIT: 5000 INJECTION, SOLUTION INTRAVENOUS; SUBCUTANEOUS at 05:47

## 2018-04-10 RX ADMIN — HEPARIN SODIUM SCH UNIT: 5000 INJECTION, SOLUTION INTRAVENOUS; SUBCUTANEOUS at 00:10

## 2018-04-10 RX ADMIN — HEPARIN SODIUM SCH UNIT: 5000 INJECTION, SOLUTION INTRAVENOUS; SUBCUTANEOUS at 13:52

## 2018-04-10 RX ADMIN — ZOLPIDEM TARTRATE SCH MG: 5 TABLET ORAL at 21:18

## 2018-04-10 NOTE — RADIOLOGY REPORT (SQ)
EXAM DESCRIPTION:  MRI LUMBAR SPINE WITHOUT



COMPLETED DATE/TIME:  4/10/2018 1:42 pm



REASON FOR STUDY:  lower extremeties weakness



COMPARISON:  None.



TECHNIQUE:  Sagittal and Axial imaging includes T1, T2, STIR and gradient echo sequences. Coronal T2/
HASTE imaging.



LIMITATIONS:  None.



FINDINGS:  VISUALIZED UPPER ABDOMEN:  Limited evaluation. No acute or suspicious findings suggested.

SEGMENTATION: No transitional anatomy. The lowest well-developed disc space is labeled L5-S1.

ALIGNMENT: Minimal grade 1 anterolisthesis of L4 over L5

VERTEBRAE: Intact.

BONE MARROW: Normal. No marrow replacement or reactive changes.

DISC SIGNAL: Diffuse decreased T2 weighted intervertebral disc signal throughout the lumbar spine

POSTERIOR ELEMENTS:  Generally intact.  No pars defect evident.  However, there are diffusely short p
edicles, causing baseline mild central canal stenosis.

HARDWARE: None in the spine.

CORD AND CONUS: Normal in size and signal intensity. Conus at the L1-2 level.

SOFT TISSUES: No aortic aneurysm seen. No bulky retroperitoneal adenopathy or mass. No paraspinal mas
s or fluid.

T12-L1:  Unremarkable

L1-L2: Broad diffuse posterior disc bulging and moderate bilateral facet and ligament hypertrophy.  B
orderline central canal narrowing.  No foraminal stenosis.

L2-L3: Broad diffuse posterior disc bulging and moderate bilateral facet and ligament hypertrophy are
 present.  There is moderate central canal stenosis with effacement of the CSF around the lumbar nerv
e roots best shown on axial T2 image 11.  Mild bilateral inferior foraminal narrowing is present with
out exiting L2 nerve root impingement.

L3-L4: Broad diffuse posterior disc bulging and marked bilateral facet hypertrophy with ligamentum fl
avum thickening causes moderate central canal stenosis with effacement of the CSF around the lumbar n
erve roots.  This is best shown on axial T2 image 17.  There is mild to moderate bilateral foraminal 
narrowing without exiting L3 nerve root impingement.  There is bilateral facet joint fluid and facet 
synovial cyst formation which does not impinge upon the spinal canal.

L4-L5: Grade 1 anterolisthesis of L4 over L5, broad diffuse posterior disc bulge and marked bilateral
 facet and ligament hypertrophy cause mild central canal stenosis.  There is moderate bilateral iris
inal narrowing right greater than left.  Partial effacement of fat around the exiting right L4 nerve 
root.  There is right-sided facet joint fluid and synovial cyst formation which does not impinge upon
 the spinal canal

L5-S1: Broad diffuse posterior disc bulge and bony spurring, moderate bilateral facet hypertrophy.  D
orsal epidural fat.  Mild to moderate central canal stenosis with partial effacement of the CSF aroun
d the lumbar nerve roots on axial T2 image 30.  There is moderate right greater than left bilateral f
oraminal narrowing.  No definite exiting L5 nerve root impingement.  There is bilateral facet joint s
ynovial fluid and synovial cyst formation which does not impinge on the spinal canal.

SACRUM: Visualized upper sacrum intact.

OTHER: No other significant findings.



IMPRESSION:  Multilevel significant central stenosis and multilevel foraminal narrowing with facet ar
thropathy as above



TECHNICAL DOCUMENTATION:  JOB ID:  [id number]

 2011 [employer]- All Rights Reserved



Reading location - IP/workstation name: [email or web address]

## 2018-04-10 NOTE — RADIOLOGY REPORT (SQ)
EXAM DESCRIPTION:  CAROTID DOPPLER



COMPLETED DATE/TIME:  4/10/2018 3:58 pm



REASON FOR STUDY:  acute neurologic syndrome



COMPARISON:  Carotid Doppler 6/19/2017

CT brain 4/9/2018

MRI brain 4/9/2018



TECHNIQUE:  Grayscale ultrasound, Doppler velocity and spectra, and color Doppler images acquired of 
the extra-cranial carotid and vertebral arteries. Images stored on PACS.



LIMITATIONS:  None.



FINDINGS:  RIGHT CAROTID

CCA Velocities: Within normal limits.

ICA Velocities

 Peak systolic 0.96 m/s.

 End diastolic 0.21 m/s.

Proximal ICA/CCA peak systolic ratio 0.9.

Spectra normal. No significant plaque.

LEFT CAROTID

CCA Velocities: Within normal limits.

ICA Velocities

 Peak systolic 0.42 m/s.

 End diastolic 0.13 m/s.

Proximal ICA/CCA peak systolic ratio 0.9.

Spectra normal. No significant plaque.

VERTEBRAL ARTERIES: Antegrade flow.  Normal waveforms.

SUBCLAVIAN ARTERIES: Not evaluated

OTHER: No other significant finding.



IMPRESSION:  No flow significant stenosis at the carotid bifurcations.

Antegrade pulsatile vertebral artery flow bilaterally



COMMENT:  Quality ID #195:  Velocity criteria are extrapolated from the diameter data as defined by t
he Society of Radiologists in Ultrasound Consensus Conference. Radiology 2003: 229; 340-346.



TECHNICAL DOCUMENTATION:  JOB ID:  [id number]

 2011 [employer]- All Rights Reserved



Reading location - IP/workstation name: [facility]

## 2018-04-10 NOTE — PHYSICIAN ADVISORY NOTE
Physician Advisor ProgressNote


.: 


Pursuant to the  plan for [facility], I have reviewed the medical record 

for this patient.  





Physician Advisor Statement: 





Please consider documenting, IF you agree:


1.  "Left hemiparesis, suspect due to _______"   


  (cerebral ischemia of ____[location]?  


   Acute cerebrovascular insufficiency?  


   Reversible cerebrovascular vasoconstriction synd?  


   TIA?  


    "Acute Rt-sided thrombotic [or embolic, or other etiology] ___ artery* CVA  

with cerebral infarction, with Lt [dominant or nondominant] hemiparesis, [

resolved or improved or persistent]"?, ...)


  





Status:  If pt shows a clinical issue that requires ongoing hospitalization for 

at least a 2nd MN, please document it explicitly, &/or attending concerns, & 

may then consider change to Inpatient status.





Thanks!


CK

## 2018-04-10 NOTE — ER DOCUMENT REPORT
ED General





- General


Chief Complaint: Weakness


Stated Complaint: LEFT SIDED WEAKNESS


Time Seen by Provider: 18 17:25


TRAVEL OUTSIDE OF THE [place] IN LAST 30 DAYS: No





- HPI


Patient complains to provider of: Left upper extremity left lower extremity 

weakness


Notes: 





Patient coming in for left upper left lower extremity weakness ongoing for 

greater than 24 hours.  Was seen by PCP today and was told to come to the ER 

for further evaluation.  Patient states she has had a history of CVA in the 

past along with hypertension diabetes.  Patient states he normally walks with a 

walker however this was difficult days that she cannot bear any weight in the 

left leg.  Patient resting comfortably upon my evaluation denies any chest pain 

hip pain dizziness nausea vomiting fevers or chills.





- Related Data


Allergies/Adverse Reactions: 


 





ibuprofen [Ibuprofen] Allergy (Severe, Verified 17 00:36)


 Chest pain











Past Medical History





- Social History


Smoking Status: Never Smoker


Chew tobacco use (# tins/day): No


Frequency of alcohol use: None


Drug Abuse: None


Family History: CAD, DM, Hypertension


Patient has suicidal ideation: No


Patient has homicidal ideation: No





- Past Medical History


Cardiac Medical History: Reports: Hx Hypercholesterolemia, Hx Hypertension, Hx 

Heart Murmur -  systolic murmur


   Denies: Hx Atrial Fibrillation, Hx Congestive Heart Failure, Hx Coronary 

Artery Disease, Hx Heart Attack, Hx Peripheral Vascular Disease, Hx Pulmonary 

Embolism


Pulmonary Medical History: Reports: Hx Asthma, Hx Bronchitis, Hx Pneumonia, Hx 

Sleep Apnea


   Denies: Hx COPD, Hx Respiratory Failure, Hx Tuberculosis


Neurological Medical History: Reports: Hx Cerebrovascular Accident - .  

Denies: Hx Seizures


Endocrine Medical History: Reports: Hx Diabetes Mellitus Type 2.  Denies: Hx 

Graves' Disease, Hx Hyperthyroidism, Hx Hypothyroidism


Renal/ Medical History: Denies: Hx Peritoneal Dialysis


Malignancy Medical History: Denies: Hx Leukemia, Hx Lung Cancer


GI Medical History: Reports: Hx Crohn's Disease, Hx Hiatal Hernia, Hx 

Pancreatitis.  Denies: Hx Gastroesophageal Reflux Disease, Hx Irritable Bowel, 

Hx Liver Failure, Hx Ulcer


Musculoskeltal Medical History: Reports Hx Arthritis, Reports Hx Fibromyalgia, 

Denies Hx Multiple Sclerosis, Denies Hx Muscular Dystrophy


Psychiatric Medical History: Reports: Hx Depression, Hx Personality Disorder


   Denies: Hx Bipolar Disorder, Hx Dementia, Hx Post Traumatic Stress Disorder, 

Hx Schizophrenia


Traumatic Medical History: Denies: Hx Fractures


Infectious Medical History: Denies: Hx HIV


Past Surgical History: Reports: Hx  Section, Hx Hysterectomy.  Denies: 

Hx Appendectomy, Hx Bowel Surgery, Hx Cholecystectomy, Hx Colostomy, Hx 

Coronary Artery Bypass Graft, Hx Gastric Bypass Surgery, Hx Herniorrhaphy, Hx 

Mastectomy, Hx Pacemaker, Hx Tonsillectomy, Hx Tubal Ligation





- Immunizations


Immunizations up to date: Yes


Hx Diphtheria, Pertussis, Tetanus Vaccination: Yes


Hx Pneumococcal Vaccination: 08





Review of Systems





- Review of Systems


Constitutional: No symptoms reported


EENT: No symptoms reported


Cardiovascular: No symptoms reported


Respiratory: No symptoms reported


Gastrointestinal: No symptoms reported


Genitourinary: No symptoms reported


Female Genitourinary: No symptoms reported


Musculoskeletal: No symptoms reported


Skin: No symptoms reported


Hematologic/Lymphatic: No symptoms reported


Neurological/Psychological: Weakness





Physical Exam





- Vital signs


Vitals: 





 











Pulse Ox


 


 96 


 


 18 17:09











Interpretation: Normal





- General


General appearance: Appears well, Alert





- HEENT


Head: Normocephalic, Atraumatic


Eyes: Normal


Pupils: PERRL





- Respiratory


Respiratory status: No respiratory distress


Chest status: Nontender


Breath sounds: Normal


Chest palpation: Normal





- Cardiovascular


Rhythm: Regular


Heart sounds: Normal auscultation


Murmur: No





- Abdominal


Inspection: Normal


Distension: No distension


Bowel sounds: Normal


Tenderness: Nontender


Organomegaly: No organomegaly





- Back


Back: Normal, Nontender





- Extremities


General upper extremity: Normal inspection, Nontender, Normal color, Normal ROM

, Normal temperature


General lower extremity: Normal inspection, Nontender, Normal color, Normal ROM

, Normal temperature, Normal weight bearing.  No: Barney's sign





- Neurological


Neuro grossly intact: Yes


Cognition: Normal


Orientation: AAOx4


[place] Coma Scale Eye Opening: Spontaneous


Rao Coma Scale Verbal: Oriented


Rao Coma Scale Motor: Obeys Commands


Rao Coma Scale Total: 15


Speech: Normal


Motor strength normal: RUE, RLE.  No: LUE, LLE


Additional motor exam normals: No: Equal  - Right greater than left, 

Pronator drift - Right greater than left


Sensory: Normal


Notes: 





See NIH score





- Psychological


Associated symptoms: Normal affect, Normal mood





- Skin


Skin Temperature: Warm


Skin Moisture: Dry


Skin Color: Normal





Course





- Re-evaluation


Re-evalutation: 





04/10/18 00:17


Discussed with the patient as she would not be a candidate for any thrombolytic 

therapy TPA or mechanical is that her symptoms are well beyond time limit.  CT 

scan of her head was otherwise negative.  Patient symptoms are concerning for 

possible intracranial pathology.  Did discuss with hospitalist will admit the 

patient for further evaluation.





- Vital Signs


Vital signs: 





 











Temp Pulse Resp BP Pulse Ox


 


 97.9 F   88   17   120/85   100 


 


 18 17:25  18 21:00  18 21:31  18 21:31  18 21:31














- Laboratory


Result Diagrams: 


 18 17:50





 18 17:50


Laboratory results interpreted by me: 





 











  18





  17:50 17:50 19:50


 


MCV  76 L  


 


MCH  24.7 L  


 


RDW  17.7 H  


 


Seg Neutrophils %  79.1 H  


 


Glucose   261 H 


 


Calcium   10.5 H 


 


Creatine Kinase   21 L 


 


Urine Glucose (UA)    >=500 H


 


Ur Leukocyte Esterase    TRACE H














Discharge





- Discharge


Clinical Impression: 


 Morbid obesity with BMI of 50.0-59.9, adult, Left arm weakness, Left leg 

weakness





HTN (hypertension)


Qualifiers:


 Hypertension type: essential hypertension Qualified Code(s): I10 - Essential (

primary) hypertension





Diabetes


Qualifiers:


 Diabetes mellitus type: type 2 Diabetes mellitus long term insulin use: 

without long term use Diabetes mellitus complication status: with unspecified 

complications Qualified Code(s): E11.8 - Type 2 diabetes mellitus with 

unspecified complications





Condition: Good


Disposition: ADMITTED AS OBSERVATION


Unit Admitted: Telemetry





ED NIH Stroke Scale





- NIH Stroke Scale


*: 1. NIH scale should be completed with appropriate accompanying assessment 

tools.


*: 2. The NIH should reflect what the patient is capable of doing and should 

not be coached by the clinician.


1a. Level of Consciousness: 0=Alert;keenly responsive


-: 1=Drowsy


-: 2=Obtunded


-: 3=Coma/unresponsive or reflex to noxious stimuli.


1a. Responses: 0


1b. Orientation Questions: a. What month is it?


-: b. How old are you?


-: 0=Answers both questions correctly.


-: 1=Answers one question correctly or patient is intubated or has orotracheal 

trauma.


-: 2=Answers neither question correctly.


1b. Responses: 0


1c. Response to commands: a. Open and close eyes?


-: b.  and release hand?


-: Credit is given despite weakness. Demonstration of task is permitted. 

Substitute command if hands cannot be used.


-: 0=Performs both tasks correctly


-: 1=Performs one task correctly


-: 2=Performs neither task correctly


1c. Responses: 0


2. Gaze: Establish eye contact and instruct patient to "Follow my finger"


-: 0=Normal


-: 1=Partial gaze palsy. Gaze is abnormal in one or both eyes, but where forced 

deviation or total gaze paresis is not present.


-: 2=Forced deviation or total gaze paresis.


2. Responses: 0


3. Visual Fields: Sees fingers in all four quadrants.


-: 0=No visual loss.


-: 1=Partial hemianopsia.


-: 2=Complete hemianopsia.


-: 3=Bilateral hemianopsia (including Cortical blindness)


3. Responses: 0


4. Facial Movement: Instruct patient to:


-: a. Show me your teeth


-: b. Raise your eyebrows


-: c. Close your eyes


-: d. Smile


-: 0=Normal symmetrical movement


-: 1=Minor paralysis (flattened nasolabial fold, asymmetry on smiling).


-: 2=Partial paralysis (total or near total paralysis of lower face).


-: 3=Complete paralysis of upper and lower face


4. Responses: 0


5. Motor functions (left arm): Alternate sides and extend each arm with palms 

down (90 degrees if sitting or 45 degrees for supine).


-: 0=No drift;limb holds for full 10 seconds.


-: 1=Drift; limb holds but drifts down before full 10 seconds, but does not hit 

bed.


-: 2=Some effort against gravity; limb cannot get to or maintain position.


-: 3=No effort against gravity; limb falls.


-: 4=No movement.


-: UN=Amputation, joint fusion, explain in comments.


5. Responses (left arm): 1


5. Motor Functions (right arm): Alternate sides and extend each arm with palms 

down (90 degrees if sitting or 45 degrees for supine).


-: 0=No drift;limb holds for full 10 seconds.


-: 1=Drift; limb holds but drifts down before full 10 seconds, but does not hit 

bed.


-: 2=Some effort against gravity; limb cannot get to or maintain position.


-: 3=No effort against gravity; limb falls.


-: 4=No movement.


-: UN=Amputation, joint fusion, explain in comments.


5. Responses (right arm): 0


6. Motor Functions (left leg): With patient lying supine, alternate sides and 

extend each leg (30 degrees always while supine).


-: 0=No drift, leg holds position for full 5 seconds


-: 1=Drift; leg falls before full 5 seconds but does not hit bed.


-: 2=Some effort against gravity, leg falls to bed but some effort against 

gravity.


-: 3=No effort against gravity, leg falls to bed immediately.


-: 4=No movement.


-: UN=Amputation, joint fusion; explain in comments.


6. Responses (left leg): 2


6. Motor Functions (right leg): With patient lying supine, alternate sides and 

extend each leg (30 degrees always while supine).


-: 0=No drift, leg holds position for full 5 seconds


-: 1=Drift; leg falls before full 5 seconds but does not hit bed.


-: 2=Some effort against gravity, leg falls to bed but some effort against 

gravity.


-: 3=No effort against gravity, leg falls to bed immediately.


-: 4=No movement.


-: UN=Amputation, joint fusion; explain in comments.


6. Responses (right leg): 0


7. Limb Ataxia: With eyes open instruct patient to:


-: a. "Touch your finger to your nose".


-: b. "Touch your heel to your shin"


-: 0=Absent


-: 1=Present in one limb.


-: 2=Present in two limbs.


-: UN=Amputation or joint fusion; explain in comments.


7. Responses: 0


8. Sensory: Test sensation using pinprick or noxious stimuli.  Test as many 

body parts as possible.


-: 0=Normal;no sensory loss


-: 1=Mile to moderate sensory loss (patient feels pin prick but is less sharp 

on affected side).


-: 2=Severe or total sensory loss.


8. Responses: 0


9. Best Language: Instruct patient to:


-: a. "Describe what you see in this picture."


-: b. "Name the items in this picture."


-: c. "Read these sentences."


-: 0=No aphasia, normal


-: 1=Mild to moderate aphasia.


-: 2=Severe aphasia


-: 3=Mute, global aphasia, no usable speech or auditory comprehension.


9. Responses: 0


10. Articulation, Dysarthia: Instruct patient to:


-: "Read these words" or "Repeat these words"


-: 0=Normal


-: 1=Mild to moderate; patient may slur some words but can be understood 

without difficulty.


-: 2=Severe; patients speech so slurred as to be unintelligible in the absence 

of dysphasia.


-: UN=Intubated or other physical barrier, explain in comments.


10. Responses: 0


11. Extinction or inattention: 0=No abnormality


-: 1= Visual, tactile, auditory, spatial, or personal inattention or extinction 

to bilateral simulation in one or the sensory modalities.


-: 2=Profound svetlana-inattention or svetlana-inattention to more than one modality; 

does not recognize own hand.


11. Responses: 0


Total Score: 3

## 2018-04-10 NOTE — PDOC PROGRESS REPORT
Subjective


Progress Note for:: 04/10/18


Subjective:: 





Patient complains of pain on his forehead and the back.  Accordingly the 

headache started first and then began experiencing some weakness in the left 

side.  Patient also reports that she suffers from back pain problems.  She 

states that she feels better when compare but she wishes to get rid of the 

headache





Review of systems


All organ systems had been reviewed and are negative except as in subjective





All significant laboratories and diagnostics have been reviewed


Reason For Visit: 


LEFT UPPER AND LOWER EXTREMITY WEAKNESS








Physical Exam


Vital Signs: 


 











Temp Pulse Resp BP Pulse Ox


 


 98.7 F   90   20   149/82 H  99 


 


 04/10/18 02:58  04/10/18 07:00  04/10/18 04:56  04/10/18 04:56  04/10/18 04:56








 Intake & Output











 04/09/18 04/10/18 04/11/18





 06:59 06:59 06:59


 


Intake Total  128 


 


Balance  128 


 


Weight  133 kg 











General appearance: PRESENT: cooperative, morbidly obese


Head exam: PRESENT: atraumatic, normocephalic


Eye exam: PRESENT: conjunctiva pink, EOMI, PERRLA


Ear exam: PRESENT: normal external ear exam


Mouth exam: PRESENT: moist


Neck exam: PRESENT: full ROM.  ABSENT: JVD, lymphadenopathy, tenderness


Respiratory exam: PRESENT: clear to auscultation kaley


Cardiovascular exam: PRESENT: RRR.  ABSENT: diastolic murmur, systolic murmur


Vascular exam: PRESENT: normal capillary refill


GI/Abdominal exam: PRESENT: normal bowel sounds, soft.  ABSENT: tenderness


Extremities exam: PRESENT: full ROM.  ABSENT: pedal edema


Musculoskeletal exam: PRESENT: ambulatory


Neurological exam: PRESENT: alert, awake, oriented to person, oriented to place

, oriented to time, oriented to situation - left sided weakness


Psychiatric exam: PRESENT: appropriate affect, normal mood


Skin exam: PRESENT: intact, normal color





Results


Laboratory Results: 


 





 04/10/18 06:13 





 











  04/10/18





  06:13


 


Sodium  141.9


 


Potassium  3.9


 


Chloride  104


 


Carbon Dioxide  32 H


 


Anion Gap  6


 


BUN  13


 


Creatinine  0.63


 


Est GFR ( Amer)  > 60


 


Est GFR (Non-Af Amer)  > 60


 


Glucose  176 H


 


Calcium  9.6











Impressions: 


 





Head MRI  04/09/18 00:00


IMPRESSION:


Normal contrast MRI of the brain.


 








Chest X-Ray  04/09/18 17:09


IMPRESSION:  NO SIGNIFICANT RADIOGRAPHIC FINDING IN THE CHEST.


 








Head CT  04/09/18 17:09


IMPRESSION:  No acute intracranial abnormality.


 Pertinent positive or negative findings of the imaging study reported as a 

CRITICAL EXAM to ER PROVIDER  at17:25 on 4/9/2018.


Category of Critical Exam: Stroke alert


EVIDENCE OF ACUTE STROKE: NO.


 














Assessment & Plan





- Diagnosis


(1) Left-sided weakness


Is this a current diagnosis for this admission?: Yes   


Plan: 


Order MRI of the lumbar spine.  Patient does have a history of sciatica.  Will 

add muscle relaxer








(2) Diabetes


Qualifiers: 


   Diabetes mellitus type: type 2   Diabetes mellitus long term insulin use: 

without long term use   Diabetes mellitus complication status: with unspecified 

complications   Qualified Code(s): E11.8 - Type 2 diabetes mellitus with 

unspecified complications   


Is this a current diagnosis for this admission?: Yes   


Plan: 


Continue sliding-scale with Humalog








(3) Hypertension


Qualifiers: 


   Hypertension type: essential hypertension   Qualified Code(s): I10 - 

Essential (primary) hypertension   


Is this a current diagnosis for this admission?: Yes   


Plan: 


 Add Norvasc and continue with lisinopril as outpatient








(4) Headache


Qualifiers: 


   Headache chronicity pattern: acute headache 


Is this a current diagnosis for this admission?: Yes   


Plan: 


Order surveyed.  Will order Fioricet and follow-up response








- Time


Time Spent with patient: 15-24 minutes


Medications reviewed and adjusted accordingly: Yes


Anticipated discharge: Home


Within: within 48 hours





- Inpatient Certification


Based on my medical assessment, after consideration of the patient's 

comorbidities, presenting symptoms, or acuity I expect that the services needed 

warrant INPATIENT care.: Yes


I certify that my determination is in accordance with my understanding of 

Medicare's requirements for reasonable and necessary INPATIENT services [42 CFR 

412.3e].: Yes


Medical Necessity: Significant Comorbidiites Make Outpatient Treatment Too Risky

, Need Close Monitoring Due to Risk of Patient Decompensation, Need For 

Continuous Telemetry Monitoring

## 2018-04-11 RX ADMIN — LISINOPRIL SCH MG: 5 TABLET ORAL at 09:31

## 2018-04-11 RX ADMIN — FLUTICASONE PROPIONATE AND SALMETEROL SCH INH: 50; 250 POWDER RESPIRATORY (INHALATION) at 21:13

## 2018-04-11 RX ADMIN — ATORVASTATIN CALCIUM SCH MG: 40 TABLET, FILM COATED ORAL at 21:09

## 2018-04-11 RX ADMIN — FUROSEMIDE SCH MG: 80 TABLET ORAL at 09:31

## 2018-04-11 RX ADMIN — METOPROLOL SUCCINATE SCH MG: 50 TABLET, EXTENDED RELEASE ORAL at 21:07

## 2018-04-11 RX ADMIN — DOCUSATE SODIUM SCH MG: 100 CAPSULE, LIQUID FILLED ORAL at 09:29

## 2018-04-11 RX ADMIN — HEPARIN SODIUM SCH UNIT: 5000 INJECTION, SOLUTION INTRAVENOUS; SUBCUTANEOUS at 21:08

## 2018-04-11 RX ADMIN — CYCLOSPORINE SCH DROP: 0.5 EMULSION OPHTHALMIC at 17:29

## 2018-04-11 RX ADMIN — INSULIN DETEMIR SCH UNIT: 100 INJECTION, SOLUTION SUBCUTANEOUS at 21:14

## 2018-04-11 RX ADMIN — TIZANIDINE SCH MG: 4 TABLET ORAL at 13:39

## 2018-04-11 RX ADMIN — TIZANIDINE SCH MG: 4 TABLET ORAL at 17:17

## 2018-04-11 RX ADMIN — ZOLPIDEM TARTRATE SCH MG: 5 TABLET ORAL at 21:08

## 2018-04-11 RX ADMIN — FLUTICASONE PROPIONATE AND SALMETEROL SCH INH: 50; 250 POWDER RESPIRATORY (INHALATION) at 09:37

## 2018-04-11 RX ADMIN — HEPARIN SODIUM SCH UNIT: 5000 INJECTION, SOLUTION INTRAVENOUS; SUBCUTANEOUS at 05:41

## 2018-04-11 RX ADMIN — DOCUSATE SODIUM SCH MG: 100 CAPSULE, LIQUID FILLED ORAL at 17:30

## 2018-04-11 RX ADMIN — INSULIN LISPRO PRN UNIT: 100 INJECTION, SOLUTION INTRAVENOUS; SUBCUTANEOUS at 17:17

## 2018-04-11 RX ADMIN — INSULIN LISPRO PRN UNIT: 100 INJECTION, SOLUTION INTRAVENOUS; SUBCUTANEOUS at 08:29

## 2018-04-11 RX ADMIN — INSULIN LISPRO PRN UNIT: 100 INJECTION, SOLUTION INTRAVENOUS; SUBCUTANEOUS at 21:10

## 2018-04-11 RX ADMIN — CLOPIDOGREL BISULFATE SCH MG: 75 TABLET, FILM COATED ORAL at 09:29

## 2018-04-11 RX ADMIN — BUTALBITAL, ACETAMINOPHEN AND CAFFEINE PRN TAB: 50; 325; 40 TABLET ORAL at 22:41

## 2018-04-11 RX ADMIN — LANSOPRAZOLE SCH MG: 15 TABLET, ORALLY DISINTEGRATING, DELAYED RELEASE ORAL at 05:41

## 2018-04-11 RX ADMIN — CYCLOBENZAPRINE HYDROCHLORIDE SCH MG: 10 TABLET, FILM COATED ORAL at 05:41

## 2018-04-11 RX ADMIN — CYCLOSPORINE SCH DROP: 0.5 EMULSION OPHTHALMIC at 09:37

## 2018-04-11 RX ADMIN — AMLODIPINE BESYLATE SCH MG: 5 TABLET ORAL at 17:17

## 2018-04-11 RX ADMIN — HEPARIN SODIUM SCH UNIT: 5000 INJECTION, SOLUTION INTRAVENOUS; SUBCUTANEOUS at 13:39

## 2018-04-11 RX ADMIN — SPIRONOLACTONE SCH MG: 25 TABLET, FILM COATED ORAL at 09:30

## 2018-04-11 NOTE — EKG REPORT
SEVERITY:- ABNORMAL ECG -

SINUS TACHYCARDIA

NONSPECIFIC T ABNORMALITIES, LATERAL LEADS

:

Confirmed by: Garett Johnston MD 11-Apr-2018 13:53:17

## 2018-04-11 NOTE — PDOC PROGRESS REPORT
Subjective


Progress Note for:: 04/11/18


Subjective:: 





Patient complains of pain on his forehead and the back.  Reports itchiness in 

her legs. Weakness resolved.  Nurse reports tachycardia





Review of systems


All organ systems had been reviewed and are negative except as in subjective





All significant laboratories and diagnostics have been reviewed


Reason For Visit: 


LEFT UPPER AND LOWER EXTREMITY WEAKNESS








Physical Exam


Vital Signs: 


 











Temp Pulse Resp BP Pulse Ox


 


 97.6 F   140 H  16   146/94 H  99 


 


 04/11/18 11:07  04/11/18 11:07  04/11/18 11:07  04/11/18 11:07  04/11/18 11:07








 Intake & Output











 04/10/18 04/11/18 04/12/18





 06:59 06:59 06:59


 


Intake Total 128 1618 


 


Balance 128 1618 


 


Weight 133 kg 135.8 kg 











General appearance: PRESENT: cooperative, morbidly obese


Head exam: PRESENT: atraumatic, normocephalic


Eye exam: PRESENT: conjunctiva pink, EOMI, PERRLA


Ear exam: PRESENT: normal external ear exam


Neck exam: PRESENT: full ROM, tenderness.  ABSENT: JVD, lymphadenopathy


Respiratory exam: PRESENT: clear to auscultation kaley


Cardiovascular exam: PRESENT: RRR.  ABSENT: diastolic murmur, systolic murmur


Vascular exam: PRESENT: normal capillary refill


GI/Abdominal exam: PRESENT: normal bowel sounds, soft.  ABSENT: tenderness


Extremities exam: PRESENT: full ROM, +1 edema


Musculoskeletal exam: PRESENT: ambulatory


Neurological exam: PRESENT: alert, awake, oriented to person, oriented to place

, oriented to time, oriented to situation, other - left sided weakness resolved


Skin exam: PRESENT: intact, normal color





Results


Laboratory Results: 


 





 04/10/18 06:13 








Impressions: 


 





Head MRI  04/09/18 00:00


IMPRESSION:


Normal contrast MRI of the brain.


 








Chest X-Ray  04/09/18 17:09


IMPRESSION:  NO SIGNIFICANT RADIOGRAPHIC FINDING IN THE CHEST.


 








Head CT  04/09/18 17:09


IMPRESSION:  No acute intracranial abnormality.


 Pertinent positive or negative findings of the imaging study reported as a 

CRITICAL EXAM to ER PROVIDER  at17:25 on 4/9/2018.


Category of Critical Exam: Stroke alert


EVIDENCE OF ACUTE STROKE: NO.


 








Lumbar Spine MRI  04/10/18 00:00


IMPRESSION:  Multilevel significant central stenosis and multilevel foraminal 

narrowing with facet arthropathy as above


 








Carotid Doppler Study  04/10/18 11:31


IMPRESSION:  No flow significant stenosis at the carotid bifurcations.


Antegrade pulsatile vertebral artery flow bilaterally


 








Cervical Spine MRI  04/11/18 00:00


IMPRESSION:  1. Cervical spondylosis without evidence of significant central 

stenosis or cord compression.  At least moderate left foraminal narrowing at 

the C4-5 level.


 














Assessment & Plan





- Diagnosis


(1) Left-sided weakness


Is this a current diagnosis for this admission?: Yes   


Plan: 


Significant spinal stenosis throughout her whole spine.  Patient denies having 

problems urinating or with constipation.  She was made aware that if this were 

to happen when having left-sided weakness and presented to emergency room she 

needed to be transferred to a tertiary center.  Will decrease IV steroids which 

was started yesterday.








(2) Diabetes


Qualifiers: 


   Diabetes mellitus type: type 2   Diabetes mellitus long term insulin use: 

without long term use   Diabetes mellitus complication status: with unspecified 

complications   Qualified Code(s): E11.8 - Type 2 diabetes mellitus with 

unspecified complications   


Is this a current diagnosis for this admission?: Yes   


Plan: 


Patient placed on long-acting and will continue sliding scale








(3) Hypertension


Qualifiers: 


   Hypertension type: essential hypertension   Qualified Code(s): I10 - 

Essential (primary) hypertension   


Is this a current diagnosis for this admission?: Yes   


Plan: 


Will change to Cardizem and Toprol.  EKG obtained showed sinus tachycardia








(4) Headache


Qualifiers: 


   Headache chronicity pattern: acute headache 


Is this a current diagnosis for this admission?: Yes   


Plan: 


Continue Fioricet and change to Zanaflex 3 times a day








(5) Tachycardia


Is this a current diagnosis for this admission?: Yes   


Plan: 


This is sinus tachycardia.  To order 500 mL IV boluses likely may relate to 

hyperglycemia and start Toprol-XL








(6) Spinal stenosis of lumbar region at multiple levels


Is this a current diagnosis for this admission?: Yes   


Plan: 


Continue IV steroids and muscle relaxers.  Patient will be referred to 

neurosurgeon as outpatient








- Time


Time Spent with patient: 15-24 minutes


Medications reviewed and adjusted accordingly: Yes


Anticipated discharge: Home


Within: within 24 hours





- Inpatient Certification


Based on my medical assessment, after consideration of the patient's 

comorbidities, presenting symptoms, or acuity I expect that the services needed 

warrant INPATIENT care.: Yes


I certify that my determination is in accordance with my understanding of 

Medicare's requirements for reasonable and necessary INPATIENT services [42 CFR 

412.3e].: Yes


Medical Necessity: Need Close Monitoring Due to Risk of Patient Decompensation

## 2018-04-11 NOTE — RADIOLOGY REPORT (SQ)
EXAM DESCRIPTION:  MRI CERVICAL SPINE WITHOUT



COMPLETED DATE/TIME:  4/11/2018 8:17 am



REASON FOR STUDY:  left sided weakness



COMPARISON:  None.



TECHNIQUE:  Sagittal and Axial imaging includes T1, T2, STIR and gradient echo sequences.



LIMITATIONS:  Mild motion.



FINDINGS:  ALIGNMENT: No subluxation.  Mild reversal of the normal cervical curvature.

VERTEBRAE: Intact.

BONE MARROW: Normal. No marrow replacement or reactive changes.

HARDWARE: None in the spine.

CORD AND BASE OF BRAIN: Normal in size and signal intensity.

SOFT TISSUES: No evidence of paraspinal mass or edema.  Aberrent internal carotid arteries with media
l deviation at the level of the oropharynx suggested.

C1-C2: No significant spinal stenosis.

C2-C3: Mild central disc protrusion without significant central stenosis or cord compression.

C3-C4: Uncovertebral spurring with mild foraminal narrowing, particularly on the left.  No central st
enosis.

C4-C5: Left uncovertebral spurring with at least moderate left foraminal stenosis.

C5-C6: Small central annular fissure.  Mild posterior ligament thickening at C5-6.  Cord contact with
out rowdy mass effect.  Relatively patent neural foramina.

C6-C7: No significant spinal stenosis or exit foraminal stenosis.

C7-T1: No significant spinal stenosis or exit foraminal stenosis.

UPPER THORACIC: Incompletely imaged. No significant spinal stenosis or exit foraminal stenosis.

OTHER: No other significant finding.



IMPRESSION:  1. Cervical spondylosis without evidence of significant central stenosis or cord rommel
corrina.  At least moderate left foraminal narrowing at the C4-5 level.



TECHNICAL DOCUMENTATION:  JOB ID:  [id number]

 2011 [employer]- All Rights Reserved



Reading location - IP/workstation name: [email or web address]

## 2018-04-12 VITALS — DIASTOLIC BLOOD PRESSURE: 76 MMHG | SYSTOLIC BLOOD PRESSURE: 133 MMHG

## 2018-04-12 LAB
ANION GAP SERPL CALC-SCNC: 13 MMOL/L (ref 5–19)
BUN SERPL-MCNC: 21 MG/DL (ref 7–20)
CALCIUM: 10.1 MG/DL (ref 8.4–10.2)
CHLORIDE SERPL-SCNC: 100 MMOL/L (ref 98–107)
CO2 SERPL-SCNC: 29 MMOL/L (ref 22–30)
GLUCOSE SERPL-MCNC: 300 MG/DL (ref 75–110)
POTASSIUM SERPL-SCNC: 4.3 MMOL/L (ref 3.6–5)
SODIUM SERPL-SCNC: 142.4 MMOL/L (ref 137–145)

## 2018-04-12 RX ADMIN — DOCUSATE SODIUM SCH MG: 100 CAPSULE, LIQUID FILLED ORAL at 09:56

## 2018-04-12 RX ADMIN — HEPARIN SODIUM SCH UNIT: 5000 INJECTION, SOLUTION INTRAVENOUS; SUBCUTANEOUS at 06:08

## 2018-04-12 RX ADMIN — INSULIN LISPRO PRN UNIT: 100 INJECTION, SOLUTION INTRAVENOUS; SUBCUTANEOUS at 12:06

## 2018-04-12 RX ADMIN — INSULIN LISPRO PRN UNIT: 100 INJECTION, SOLUTION INTRAVENOUS; SUBCUTANEOUS at 09:00

## 2018-04-12 RX ADMIN — LISINOPRIL SCH MG: 5 TABLET ORAL at 09:57

## 2018-04-12 RX ADMIN — FUROSEMIDE SCH MG: 80 TABLET ORAL at 09:57

## 2018-04-12 RX ADMIN — HEPARIN SODIUM SCH UNIT: 5000 INJECTION, SOLUTION INTRAVENOUS; SUBCUTANEOUS at 13:23

## 2018-04-12 RX ADMIN — FLUTICASONE PROPIONATE AND SALMETEROL SCH INH: 50; 250 POWDER RESPIRATORY (INHALATION) at 09:55

## 2018-04-12 RX ADMIN — CLOPIDOGREL BISULFATE SCH MG: 75 TABLET, FILM COATED ORAL at 09:56

## 2018-04-12 RX ADMIN — SPIRONOLACTONE SCH MG: 25 TABLET, FILM COATED ORAL at 09:56

## 2018-04-12 RX ADMIN — CYCLOSPORINE SCH DROP: 0.5 EMULSION OPHTHALMIC at 09:55

## 2018-04-12 RX ADMIN — TIZANIDINE SCH MG: 4 TABLET ORAL at 13:23

## 2018-04-12 RX ADMIN — METOPROLOL SUCCINATE SCH MG: 50 TABLET, EXTENDED RELEASE ORAL at 09:56

## 2018-04-12 RX ADMIN — TIZANIDINE SCH MG: 4 TABLET ORAL at 09:57

## 2018-04-12 RX ADMIN — LANSOPRAZOLE SCH MG: 15 TABLET, ORALLY DISINTEGRATING, DELAYED RELEASE ORAL at 06:08

## 2018-04-12 NOTE — PDOC DISCHARGE SUMMARY
General





- Admit/Disc Date/PCP


Admission Date/Primary Care Provider: 


  04/11/18 15:56





  KAYLEY CHILDS PA-C





Discharge Date: 04/12/18





- Discharge Diagnosis


(1) Atypical migraine


Is this a current diagnosis for this admission?: Yes   





(2) Spinal stenosis of lumbar region at multiple levels


Is this a current diagnosis for this admission?: Yes   





(3) Left-sided weakness


Is this a current diagnosis for this admission?: Yes   





(4) Diabetes


Is this a current diagnosis for this admission?: Yes   





(5) Hypertension


Is this a current diagnosis for this admission?: Yes   





(6) Tachycardia


Is this a current diagnosis for this admission?: Yes   





(7) Lupus


Is this a current diagnosis for this admission?: Yes   





(8) GOSIA (obstructive sleep apnea)


Is this a current diagnosis for this admission?: Yes   





(9) Diabetes


Is this a current diagnosis for this admission?: Yes   





(10) HTN (hypertension)


Is this a current diagnosis for this admission?: Yes   





- Additional Information


Resuscitation Status: Full Code


Discharge Diet: Cardiac, Diabetic


Discharge Activity: Activity As Tolerated


Prescriptions: 


Butalb/Acetaminophen/Caffeine [Fioricet (-40 mg) Tablet] 1 tab PO Q4HP 

PRN #30 each


 PRN Reason: 


Diltiazem HCl [Cardizem Cd 240 mg Capsule.cr] 240 mg PO QHS #30 capsule.cr


Tizanidine HCl [Zanaflex 4 mg Tablet] 4 mg PO TID #90 tablet


Home Medications: 








Albuterol Sulfate [Ventolin HFA MDI 18 GM] 2 puff IH Q6HP PRN 04/09/18 


Atorvastatin Calcium [Lipitor 20 mg Tablet] 20 mg PO QHS 04/09/18 


Celecoxib [Celebrex 200 mg Capsule] 200 mg PO BIDPCBS 04/09/18 


Cetirizine HCl [Zyrtec 10 mg Tablet] 10 mg PO QPM 04/09/18 


Cevimeline HCl [Evoxac] 30 mg PO TID 04/09/18 


Clopidogrel Bisulfate [Plavix 75 mg Tablet] 75 mg PO DAILY 04/09/18 


Cyclosporine 0.05% Oph Emulsio [Restasis 0.05% Oph Emulsion Pf 0.4 ml] 1 drop 

OU BID 04/09/18 


Docusate Sodium [Colace 100 mg Capsule] 100 mg PO BID 04/09/18 


Ergocalciferol (Vitamin D2) [Drisdol 50,000 unit (1.25MG) Capsule] 50,000 unit 

PO MO@0800 04/09/18 


Etanercept [Enbrel] 50 mg SQ TU@1100 04/09/18 


Famotidine [Pepcid 40 mg Tablet] 40 mg PO QHS 04/09/18 


Fluticasone/Salmeterol [Advair 250-50 Diskus 14 Dose/Diskus] 1 puff IH Q12 04/09 /18 


Folic Acid [Folvite 1 mg Tablet] 1 mg PO DAILY 04/09/18 


Furosemide [Lasix 80 mg Tablet] 80 mg PO DAILY 04/09/18 


Ipratropium/Albuterol Sulfate [Combivent Respimat 4 gm Mdi] 1 puff IH Q6 04/09/ 18 


Ketorolac Tromethamine [Toradol 10 mg Tablet] 10 mg PO HSP PRN 04/09/18 


Lisinopril [Prinivil 5 mg Tablet] 5 mg PO DAILY 04/09/18 


Metformin HCl [Glucophage] 1,000 mg PO BIDBS 04/09/18 


Methotrexate Sodium [Methotrexate] 5 mg PO TH@2200 04/09/18 


Metoprolol Succinate [Toprol  mg Tablet] 100 mg PO Q12 04/09/18 


Montelukast Sodium [Singulair 10 mg Tablet] 10 mg PO QPM 04/09/18 


Nystatin [Mycostatin Cream 15 gm] 1 applic TOP BID 04/09/18 


Oxycodone HCl 15 mg PO Q6HP PRN 04/09/18 


Prednisone [Deltasone 5 mg Tablet] 10 mg PO [id number] 04/09/18 


Pregabalin [Lyrica] 150 mg PO Q12 04/09/18 


Prenatal Vit,Calc76/Iron/Folic [Prenatabs Rx Tablet] 1 tab PO DAILY 04/09/18 


Spironolactone [Aldactone 25 mg Tablet] 25 mg PO DAILY 04/09/18 


Butalb/Acetaminophen/Caffeine [Fioricet (-40 mg) Tablet] 1 tab PO Q4HP 

PRN #30 each 04/12/18 


Diltiazem HCl [Cardizem Cd 240 mg Capsule.cr] 240 mg PO QHS #30 capsule.cr 04/12 /18 


Tizanidine HCl [Zanaflex 4 mg Tablet] 4 mg PO TID #90 tablet 04/12/18 











History of Present Illness


History of Present Illness: 


KALIE DANIEL is a 57 year old female patient with multiple comorbidities 

including COPD, GOSIA,, HTN, HLD, morbid obesity, fibromyalgia, diabetes mellitus

, lupus and prior history of TIA/stroke who presented with chief complaint of 

weakness involving the left upper and lower extremities.  Patient reported that 

she was in her baseline state of health up until one day when she started to 

have :"Bad headache" followed by tingling sensation and numbness and inability 

to move her left upper and lower extremities.  Her initial blood work, urine 

drug screen and CT of the head were unremarkable.  When initially examined by 

hospitalist service examined the patient was able to elevate her left upper and 

lower extremity against gravity.  She did not have pronation drift.  Patient 

denied chills, fever, chest pain, palpitation, diaphoresis, nausea, vomiting, 

diarrhea or any urinary complaints.  Had headache but denied dizziness or 

blurring of vision.  No seizure activities.  No dyspnea or osteopenia.  Patient 

was admitted on the hospitalist service








Hospital Course


Hospital Course: 





Patient was admitted to [facility] for the purpose of evaluating patient for TIA.  MRI 

of the brain was negative. Carotid dopplers were negative. Since her 

presentation was suggestive of an atypical migraine versus spinal stenosis, 

ordered an MRI of the cervical or lumbar spine.  The MRI of the cervical spine 

showed cervical spondylosis with no compression.  MRI of the lumbar spine was 

significant due to multilevel central spinal stenosis.  Set rate was 21. 

Patient was placed on IV steroids and muscle relaxers.  Symptoms abated within 

24 hours of starting intervention.  Patient though persisted with headache but 

improved.  She was discharged on an increased dose of Zanaflex to 4 mg 3 times 

daily.  She is currently on steroids orally and dose was to remain the same. 

The impression is that headache appears to be atypical in nature wiht left 

sided weakness presentation.  This patient suffers from lupus and her 

presentation may relate to this issue.  In addition patient suffers from spinal 

stenosis with no cord compression.  She has been advised that if any urinary 

retention or constipation acutely she was to present to emergency room for 

further evaluation.  Patient has also been advised to follow-up with her 

rheumatologist.  I strongly advise any providers that may engage in taking care 

of this patient in an emergency setting and if patient presenting under similar 

circumstances, to transfer patient due to her complicated history and need of 

further evaluation by rheumatologist, neurologist and neurosurgeon.  We placed 

patient on Cardizem CD for the management of her blood pressure.  Patient is 

discharged under home health care and will continue PT at home.  Since patient 

reported improvement and had achieved maximum benefit of hospitalization at 

this facility prompted to discharge.





Physical Exam


Vital Signs: 


 











Temp Pulse Resp BP Pulse Ox


 


 98.1 F   91   19   140/83 H  98 


 


 04/12/18 07:36  04/12/18 11:23  04/12/18 11:23  04/12/18 08:00  04/12/18 11:23








 Intake & Output











 04/11/18 04/12/18 04/13/18





 06:59 06:59 06:59


 


Intake Total  2187 


 


Balance  2187 


 


Weight  131.5 kg 











General appearance: PRESENT: no acute distress, cooperative, morbidly obese


Head exam: PRESENT: atraumatic, normocephalic


Eye exam: PRESENT: conjunctiva pink, EOMI, PERRLA


Ear exam: PRESENT: normal external ear exam


Mouth exam: PRESENT: moist


Neck exam: PRESENT: full ROM.  ABSENT: JVD, lymphadenopathy, tenderness


Respiratory exam: PRESENT: clear to auscultation kaley


Cardiovascular exam: PRESENT: RRR.  ABSENT: diastolic murmur, systolic murmur


Vascular exam: PRESENT: normal capillary refill


GI/Abdominal exam: PRESENT: normal bowel sounds, soft.  ABSENT: tenderness


Extremities exam: PRESENT: full ROM, +1 edema


Musculoskeletal exam: PRESENT: ambulatory


Neurological exam: PRESENT: alert, awake, oriented to person, oriented to place

, oriented to time, oriented to situation, CN II-XII grossly intact


Psychiatric exam: PRESENT: appropriate affect, normal mood


Skin exam: PRESENT: intact, normal color





Results


Laboratory Results: 


 





 04/12/18 05:30 





 











  04/12/18





  05:30


 


Sodium  142.4


 


Potassium  4.3


 


Chloride  100


 


Carbon Dioxide  29


 


Anion Gap  13


 


BUN  21 H


 


Creatinine  0.77


 


Est GFR ( Amer)  > 60


 


Est GFR (Non-Af Amer)  > 60


 


Glucose  300 H


 


Calcium  10.1


 


Magnesium  1.8











Impressions: 


 





Head MRI  04/09/18 00:00


IMPRESSION:


Normal contrast MRI of the brain.


 








Chest X-Ray  04/09/18 17:09


IMPRESSION:  NO SIGNIFICANT RADIOGRAPHIC FINDING IN THE CHEST.


 








Head CT  04/09/18 17:09


IMPRESSION:  No acute intracranial abnormality.


 Pertinent positive or negative findings of the imaging study reported as a 

CRITICAL EXAM to ER PROVIDER  at17:25 on 4/9/2018.


Category of Critical Exam: Stroke alert


EVIDENCE OF ACUTE STROKE: NO.


 








Lumbar Spine MRI  04/10/18 00:00


IMPRESSION:  Multilevel significant central stenosis and multilevel foraminal 

narrowing with facet arthropathy as above


 








Carotid Doppler Study  04/10/18 11:31


IMPRESSION:  No flow significant stenosis at the carotid bifurcations.


Antegrade pulsatile vertebral artery flow bilaterally


 








Cervical Spine MRI  04/11/18 00:00


IMPRESSION:  1. Cervical spondylosis without evidence of significant central 

stenosis or cord compression.  At least moderate left foraminal narrowing at 

the C4-5 level.


 














Qualifiers





- *


**PATEINT BEING DISCHARGED WITH ANY OF THE FOLLOWING DIAGNOSIS?: No





Plan


Discharge Plan: 





Discharge on that home health.  Patient has been advised to follow-up with PCP.

  An appointment has been made with neurosurgeon in [place].


Time Spent: Less than 30 Minutes

## 2018-08-16 ENCOUNTER — HOSPITAL ENCOUNTER (OUTPATIENT)
Dept: HOSPITAL 62 - SP | Age: 58
End: 2018-08-16
Attending: PHYSICIAN ASSISTANT
Payer: MEDICARE

## 2018-08-16 DIAGNOSIS — M79.605: ICD-10-CM

## 2018-08-16 DIAGNOSIS — M79.604: Primary | ICD-10-CM

## 2018-08-16 PROCEDURE — 93970 EXTREMITY STUDY: CPT

## 2018-08-16 NOTE — RADIOLOGY REPORT (SQ)
EXAM DESCRIPTION:  VENOUS BILATERAL LOWER



COMPLETED DATE/TIME:  8/16/2018 12:03 pm



REASON FOR STUDY:  BLE PAIN M79.604  PAIN IN RIGHT LEG M79.605  PAIN IN LEFT LEG



COMPARISON:  Lower extremity venous Doppler 12/21/2016



TECHNIQUE:  Dynamic and static gray scale and color images acquired of both lower extremity venous sy
stems. Selected spectral images acquired with additional compression and augmentation maneuvers. Imag
es stored on PACS.



LIMITATIONS:  None.



FINDINGS:  RIGHT LEG

COMMON FEMORAL AND FEMORAL: Normal phasicity, compression and augmentation. No visualized echogenic m
aterial on gray scale. No defects on color images.

POPLITEAL: Normal compression and augmentation. No visualized echogenic material on gray scale. No de
fects on color images.

CALF VESSELS: Normal compression and augmentation. No visualized echogenic material on gray scale. No
 defects on color image.

GSV AND SSV: Normal compression. No visualized echogenic material on gray scale. No defects on color 
images.

ANY DEEP VENOUS INSUFFICIENCY: Not evaluated.

ANY EVIDENCE OF POPLITEAL CYST: No.

OTHER: No other significant finding.

LEFT LEG

COMMON FEMORAL AND FEMORAL: Normal phasicity, compression and augmentation. No visualized echogenic m
aterial on gray scale. No defects on color images.

POPLITEAL: Normal compression and augmentation. No visualized echogenic material on gray scale. No de
fects on color images.

CALF VESSELS: Normal compression and augmentation. No visualized echogenic material on gray scale. No
 defects on color images.

GSV AND SSV: Normal compression. No visualized echogenic material on gray scale. No defects on color 
images.

ANY DEEP VENOUS INSUFFICIENCY: Not evaluated.

ANY EVIDENCE POPLITEAL CYST: No.

OTHER: No other significant finding.



IMPRESSION:  NO EVIDENCE DVT OR SVT IN EITHER LEG.



TECHNICAL DOCUMENTATION:  JOB ID:  [id number]

 2011 [employer]- All Rights Reserved



Reading location - IP/workstation name: [email or web address]

## 2018-09-15 ENCOUNTER — HOSPITAL ENCOUNTER (EMERGENCY)
Dept: HOSPITAL 62 - ER | Age: 58
Discharge: HOME | End: 2018-09-15
Payer: MEDICARE

## 2018-09-15 VITALS — SYSTOLIC BLOOD PRESSURE: 114 MMHG | DIASTOLIC BLOOD PRESSURE: 69 MMHG

## 2018-09-15 DIAGNOSIS — J44.9: ICD-10-CM

## 2018-09-15 DIAGNOSIS — I10: ICD-10-CM

## 2018-09-15 DIAGNOSIS — E66.01: ICD-10-CM

## 2018-09-15 DIAGNOSIS — G47.33: Primary | ICD-10-CM

## 2018-09-15 DIAGNOSIS — E11.9: ICD-10-CM

## 2018-09-15 DIAGNOSIS — R06.02: ICD-10-CM

## 2018-09-15 LAB
ADD MANUAL DIFF: NO
ALBUMIN SERPL-MCNC: 4.2 G/DL (ref 3.5–5)
ALP SERPL-CCNC: 61 U/L (ref 38–126)
ALT SERPL-CCNC: 24 U/L (ref 9–52)
ANION GAP SERPL CALC-SCNC: 11 MMOL/L (ref 5–19)
AST SERPL-CCNC: 40 U/L (ref 14–36)
BASOPHILS # BLD AUTO: 0 10^3/UL (ref 0–0.2)
BASOPHILS NFR BLD AUTO: 0.3 % (ref 0–2)
BILIRUB DIRECT SERPL-MCNC: 0.5 MG/DL (ref 0–0.4)
BILIRUB SERPL-MCNC: 0.7 MG/DL (ref 0.2–1.3)
BUN SERPL-MCNC: 20 MG/DL (ref 7–20)
CALCIUM: 9.9 MG/DL (ref 8.4–10.2)
CHLORIDE SERPL-SCNC: 105 MMOL/L (ref 98–107)
CO2 SERPL-SCNC: 23 MMOL/L (ref 22–30)
EOSINOPHIL # BLD AUTO: 0.1 10^3/UL (ref 0–0.6)
EOSINOPHIL NFR BLD AUTO: 1.2 % (ref 0–6)
ERYTHROCYTE [DISTWIDTH] IN BLOOD BY AUTOMATED COUNT: 21.6 % (ref 11.5–14)
GLUCOSE SERPL-MCNC: 353 MG/DL (ref 75–110)
HCT VFR BLD CALC: 39.1 % (ref 36–47)
HGB BLD-MCNC: 12.6 G/DL (ref 12–15.5)
LYMPHOCYTES # BLD AUTO: 0.9 10^3/UL (ref 0.5–4.7)
LYMPHOCYTES NFR BLD AUTO: 11.6 % (ref 13–45)
MCH RBC QN AUTO: 24.9 PG (ref 27–33.4)
MCHC RBC AUTO-ENTMCNC: 32.2 G/DL (ref 32–36)
MCV RBC AUTO: 77 FL (ref 80–97)
MONOCYTES # BLD AUTO: 0.3 10^3/UL (ref 0.1–1.4)
MONOCYTES NFR BLD AUTO: 3.5 % (ref 3–13)
NEUTROPHILS # BLD AUTO: 6.7 10^3/UL (ref 1.7–8.2)
NEUTS SEG NFR BLD AUTO: 83.4 % (ref 42–78)
PLATELET # BLD: 277 10^3/UL (ref 150–450)
POTASSIUM SERPL-SCNC: 5.4 MMOL/L (ref 3.6–5)
PROT SERPL-MCNC: 7.4 G/DL (ref 6.3–8.2)
RBC # BLD AUTO: 5.06 10^6/UL (ref 3.72–5.28)
SODIUM SERPL-SCNC: 138.6 MMOL/L (ref 137–145)
TOTAL CELLS COUNTED % (AUTO): 100 %
WBC # BLD AUTO: 8.1 10^3/UL (ref 4–10.5)

## 2018-09-15 PROCEDURE — 94640 AIRWAY INHALATION TREATMENT: CPT

## 2018-09-15 PROCEDURE — 99285 EMERGENCY DEPT VISIT HI MDM: CPT

## 2018-09-15 PROCEDURE — 85025 COMPLETE CBC W/AUTO DIFF WBC: CPT

## 2018-09-15 PROCEDURE — 93005 ELECTROCARDIOGRAM TRACING: CPT

## 2018-09-15 PROCEDURE — 93010 ELECTROCARDIOGRAM REPORT: CPT

## 2018-09-15 PROCEDURE — 80053 COMPREHEN METABOLIC PANEL: CPT

## 2018-09-15 PROCEDURE — 71046 X-RAY EXAM CHEST 2 VIEWS: CPT

## 2018-09-15 PROCEDURE — 36415 COLL VENOUS BLD VENIPUNCTURE: CPT

## 2018-09-15 PROCEDURE — 83880 ASSAY OF NATRIURETIC PEPTIDE: CPT
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
## 2018-09-15 NOTE — EKG REPORT
SEVERITY:- OTHERWISE NORMAL ECG -

SINUS TACHYCARDIA

:

Confirmed by: Dez Fabian 15-Sep-2018 21:10:05

## 2019-04-11 ENCOUNTER — HOSPITAL ENCOUNTER (OUTPATIENT)
Dept: HOSPITAL 62 - RAD | Age: 59
End: 2019-04-11
Attending: PAIN MEDICINE
Payer: MEDICARE

## 2019-04-11 ENCOUNTER — HOSPITAL ENCOUNTER (EMERGENCY)
Dept: HOSPITAL 62 - ER | Age: 59
Discharge: HOME | End: 2019-04-11
Payer: MEDICARE

## 2019-04-11 VITALS — SYSTOLIC BLOOD PRESSURE: 127 MMHG | DIASTOLIC BLOOD PRESSURE: 84 MMHG

## 2019-04-11 DIAGNOSIS — E11.9: ICD-10-CM

## 2019-04-11 DIAGNOSIS — I10: ICD-10-CM

## 2019-04-11 DIAGNOSIS — Z79.4: ICD-10-CM

## 2019-04-11 DIAGNOSIS — R60.0: Primary | ICD-10-CM

## 2019-04-11 DIAGNOSIS — R10.9: ICD-10-CM

## 2019-04-11 DIAGNOSIS — M25.551: ICD-10-CM

## 2019-04-11 DIAGNOSIS — M79.651: ICD-10-CM

## 2019-04-11 DIAGNOSIS — Z79.899: ICD-10-CM

## 2019-04-11 DIAGNOSIS — M54.41: Primary | ICD-10-CM

## 2019-04-11 DIAGNOSIS — J45.909: ICD-10-CM

## 2019-04-11 PROCEDURE — 72110 X-RAY EXAM L-2 SPINE 4/>VWS: CPT

## 2019-04-11 PROCEDURE — 73502 X-RAY EXAM HIP UNI 2-3 VIEWS: CPT

## 2019-04-11 PROCEDURE — 93971 EXTREMITY STUDY: CPT

## 2019-04-11 PROCEDURE — 99284 EMERGENCY DEPT VISIT MOD MDM: CPT

## 2019-04-11 NOTE — RADIOLOGY REPORT (SQ)
EXAM DESCRIPTION:  HIP RIGHT AP/LATERAL



COMPLETED DATE/TIME:  4/11/2019 3:19 pm



REASON FOR STUDY:  Hip pain/sciatica



COMPARISON:  None.



NUMBER OF VIEWS:  Two views.



TECHNIQUE:  AP pelvis and additional frog-leg view of the right hip.



LIMITATIONS:  None.



FINDINGS:  MINERALIZATION: Normal.

RIGHT HIP: No fracture or dislocation.  No worrisome bone lesions. No contour deformity.  No joint sp
ace narrowing.

LEFT HIP: No fracture or dislocation.  No worrisome bone lesions.

PUBIS AND ISCHIUM: No fracture.

PELVIS: No fracture.

SACRUM: No fracture or dislocation. No worrisome bone lesions.

LOWER LUMBAR SPINE: No fracture or dislocation. No worrisome bone lesions.  No significant disc disea
se.

SOFT TISSUES: No findings.

OTHER: No other significant finding.



IMPRESSION:  NEGATIVE STUDY OF THE RIGHT HIP. NO EXPLANATION FOR PAIN.



TECHNICAL DOCUMENTATION:  JOB ID:  [id number]

 2011 [employer]- All Rights Reserved



Reading location - IP/workstation name: [email or web address]

## 2019-04-11 NOTE — ER DOCUMENT REPORT
ED General Pain





- General


Chief Complaint: Flank Pain


Stated Complaint: FLANK PAIN


Time Seen by Provider: 19 14:20


Primary Care Provider: 


JOHANNA AGUILERA MD [Primary Care Provider] - Follow up as needed


Mode of Arrival: Wheelchair


Information source: Patient


Notes: 





Patient is a 58-year-old female comes emergency room complaining of right hip 

and low back pain with radiation down the right thigh.  Patient states started 

approximately 3 days ago when she was trying to get up out of the couch when she

felt a little twinge and has had difficulty since then.  Pain is increasingly 

got worse.  Patient is currently being treated in pain management for lupus and 

currently takes 15 mg of oxycodone combined with tramadol.  She is also on 

Zanaflex.  She is an insulin-dependent diabetic and hypertensive patient.  

Patient denies any traumatic events and has not had no falls.


TRAVEL OUTSIDE OF THE [place] IN LAST 30 DAYS: No





- HPI


Onset: Other


Onset/Duration: Gradual - 3 days ago


Quality of pain: Sharp, Stabbing, Throbbing


Severity: Moderate


Pain Level: 3


Context: Joint pain


Typical of prior episodes of painful crisis: No


Exacerbated by: Supine, Sitting, Standing, Movement, Walking


Relieved by: Denies





- Related Data


Allergies/Adverse Reactions: 


                                        





ibuprofen [Ibuprofen] Allergy (Severe, Verified 17 00:36)


   Chest pain


Penicillins Allergy (Verified 19 14:00)


   











Past Medical History





- General


Information source: Patient, [facility] Records





- Social History


Smoking Status: Never Smoker


Cigarette use (# per day): No


Chew tobacco use (# tins/day): No


Smoking Education Provided: No


Frequency of alcohol use: None


Drug Abuse: None


Lives with: Family


Family History: Reviewed & Not Pertinent, CAD, DM, Hypertension


Patient has suicidal ideation: No


Patient has homicidal ideation: No





- Past Medical History


Cardiac Medical History: Reports: Hx Hypercholesterolemia, Hx Hypertension, Hx 

Heart Murmur -  systolic murmur


   Denies: Hx Atrial Fibrillation, Hx Congestive Heart Failure, Hx Coronary 

Artery Disease, Hx Heart Attack, Hx Peripheral Vascular Disease, Hx Pulmonary 

Embolism


Pulmonary Medical History: Reports: Hx Asthma, Hx Bronchitis, Hx Pneumonia, Hx 

Sleep Apnea


   Denies: Hx COPD, Hx Respiratory Failure, Hx Tuberculosis


Neurological Medical History: Reports: Hx Cerebrovascular Accident - .  

Denies: Hx Seizures


Endocrine Medical History: Reports: Hx Diabetes Mellitus Type 2.  Denies: Hx 

Graves' Disease, Hx Hyperthyroidism, Hx Hypothyroidism


Renal/ Medical History: Denies: Hx Peritoneal Dialysis


Malignancy Medical History: Denies: Hx Leukemia, Hx Lung Cancer


GI Medical History: Reports: Hx Crohn's Disease, Hx Hiatal Hernia, Hx 

Pancreatitis.  Denies: Hx Gastroesophageal Reflux Disease, Hx Irritable Bowel, 

Hx Liver Failure, Hx Ulcer


Musculoskeletal Medical History: Reports Hx Arthritis, Reports Hx Fibromyalgia, 

Denies Hx Multiple Sclerosis, Denies Hx Muscular Dystrophy


Psychiatric Medical History: Reports: Hx Depression, Hx Personality Disorder


   Denies: Hx Bipolar Disorder, Hx Dementia, Hx Post Traumatic Stress Disorder, 

Hx Schizophrenia


Traumatic Medical History: Denies: Hx Fractures


Infectious Medical History: Denies: Hx HIV


Past Surgical History: Reports: Hx  Section, Hx Hysterectomy.  Denies: 

Hx Appendectomy, Hx Bowel Surgery, Hx Cholecystectomy, Hx Colostomy, Hx Coronary

 Artery Bypass Graft, Hx Gastric Bypass Surgery, Hx Herniorrhaphy, Hx 

Mastectomy, Hx Pacemaker, Hx Tonsillectomy, Hx Tubal Ligation





- Immunizations


Immunizations up to date: Yes


Hx Diphtheria, Pertussis, Tetanus Vaccination: Yes


Hx Pneumococcal Vaccination: 08





Review of Systems





- Review of Systems


Constitutional: No symptoms reported


EENT: No symptoms reported


Cardiovascular: No symptoms reported


Respiratory: No symptoms reported


Gastrointestinal: No symptoms reported


Genitourinary: No symptoms reported


Female Genitourinary: No symptoms reported


Musculoskeletal: See HPI, Back pain, Joint pain, Muscle pain


Skin: No symptoms reported


Hematologic/Lymphatic: No symptoms reported


Neurological/Psychological: No symptoms reported


-: Yes All other systems reviewed and negative





Physical Exam





- Vital signs


Vitals: 


                                        











Temp Pulse Resp BP Pulse Ox


 


 98.1 F   114 H  16   127/84 H  96 


 


 19 14:04  19 14:04  19 14:04  19 14:04  19 14:04











Interpretation: Hypertensive, Tachycardic





- Notes


Notes: 





Patient is a well-nourished well-developed morbidly obese female who is in no 

apparent distress on physical exam today but does appear to be in somewhat 

moderate discomfort.





PHYSICAL EXAMINATION:





GENERAL: See above





HEAD: Atraumatic, normocephalic.





EYES: Pupils equal round and reactive to light, extraocular movements intact, 

conjunctiva are normal.





ENT: Nares patent, oropharynx clear without exudates.  Moist mucous membranes.





NECK: Normal range of motion, supple without lymphadenopathy





LUNGS: Breath sounds clear to auscultation bilaterally and equal.  No wheezes 

rales or rhonchi.





HEART: Regular rate and rhythm without murmurs





ABDOMEN: Soft, nontender, nondistended abdomen.  No guarding, no rebound.  No 

masses appreciated.





Female : deferred





Musculoskeletal: Examination patient's area of concern is her low back down into

 her right buttocks and into the groin and upper thigh of the right side.  

Physical exam shows patient to be moderately tender to palpation in the LS spine

 around L4-L5.  Tracking down into the right buttocks patient has moderate 

tenderness at the sciatic notch with deep ballottement.  There is also some pain

 down into the right thigh and down to the knee.  Patient displays good DTRs she

 has good feeling bilaterally in the lower extremities.  Vascular exam also 

shows good popliteal pulses bilaterally and good dorsalis pedal pulses.  Patient

 displays equal feeling bilaterally all the way up to the pelvis.  No sign of 

saddle paresthesia.





NEUROLOGICAL:  Normal speech, normal gait.  Normal sensory, motor exams





PSYCH: Normal mood, normal affect.





SKIN: Warm, Dry, normal turgor, no rashes or lesions noted.





Course





- Re-evaluation


Re-evalutation: 





19 16:00


Patient had a little bit of relief with the Toradol injection.  X-ray showed 

that she has a little bit newer area of degeneration at L3-L4 which may be 

causing this new discomfort.  Is definitely a sciatic presentation.  Patient 

sees pain management [facility] I believe in [place].  I have 

instructed her to contact them for also additional help possibly for some 

epidural injections which may prove beneficial for her.  And plus she also 

probably needs an MRI outpatient to further narrow down the area of involvement.

  We will place patient on a low-dose steroid taper and I have instructed her to

 watch her sugars if he gets over 325 350 for more than 24 hours to come back 

into the ER.  Also to place her on some gabapentin 300 mg at night before bed.  

I have instructed her to do stretches as well and ice down the area





- Vital Signs


Vital signs: 


                                        











Temp Pulse Resp BP Pulse Ox


 


 98.1 F   114 H  16   127/84 H  96 


 


 19 14:04  19 14:04  19 14:04  19 14:04  19 14:04














Discharge





- Discharge


Clinical Impression: 


Right-sided low back pain with sciatica


Qualifiers:


 Chronicity: acute Sciatica laterality: sciatica of right side Qualified 

Code(s): M54.41 - Lumbago with sciatica, right side





Condition: Good


Disposition: HOME, SELF-CARE


Instructions:  Sciatica ([facility])


Additional Instructions: 


Home and rest.  Medication as prescribed.  Continue your current medications 

also for pain.  Ice to the areas that hurt 3 times a day.  You will need to see 

a pain management doctor that she already see a [facility] for 

possible intervention.  The possibility also will need an MRI to narrow down the

 area of the causative problem.  Should you have any loss of urine or stool also

 return to ER at once for reexamination.


Prescriptions: 


Gabapentin 300 mg PO HSP #30 ml


Prednisone 5 mg PO ASDIR PRN 6 Days #21 tab


 PRN Reason: 


Forms:  Elevated Blood Pressure


Referrals: 


JOHANNA AGUILERA MD [Primary Care Provider] - Follow up as needed

## 2019-04-11 NOTE — RADIOLOGY REPORT (SQ)
EXAM DESCRIPTION:  L SPINE WHOLE



COMPLETED DATE/TIME:  4/11/2019 3:19 pm



REASON FOR STUDY:  Low back pain



COMPARISON:  8/9/2010



NUMBER OF VIEWS:  Five views including obliques.



TECHNIQUE:  AP, lateral, oblique, and sacral radiographic images acquired of the lumbar spine.



LIMITATIONS:  None.



FINDINGS:  MINERALIZATION: Normal.

SEGMENTATION: Normal.  No transitional anatomy.

ALIGNMENT: Mild stable scoliosis convex to the left.  Minimal anterolisthesis L4 with respect L5 unch
anged.

VERTEBRAE: Maintained height.  No fracture or worrisome bone lesion.

DISCS: Moderate disc degeneration anteriorly L3-L4 not present on the older study of 2010.

POSTERIOR ELEMENTS: Pedicles and facets are intact.  No pars defect or posterior arch defects.

HARDWARE: None in the spine.

PARASPINAL SOFT TISSUES: Normal.

PELVIS: Intact as visualized. No fractures or worrisome bone lesions. SI joints intact.

OTHER: No other significant finding.



IMPRESSION:  Minimal anterolisthesis L4-L5 and mild scoliosis stable since 2010.  Moderate disc degen
eration anteriorly at L3-L4 which was not present on the older study.



TECHNICAL DOCUMENTATION:  JOB ID:  [id number]

 2011 [employer]- All Rights Reserved



Reading location - IP/workstation name: [email or web address]

## 2019-04-12 NOTE — XCELERA REPORT
[address] [place]

                             [facility] [address]

                               Tel: [phone]

                               Fax: [phone]



                       Lower Extremity Venous Evaluation

_______________________________________________________________________________



_______________________________________________________________________________

Procedure: Color flow and duplex imaging of the veins of the left lower

extremity as well as the right Common Femoral vein.





_______________________________________________________________________________



_______________________________________________________________________________



Right Sided Venous Evaluation

The right common femoral vein is fully compressible. Spontaneous and phasic

flow is present in the right common femoral vein.



Left Sided Venous Evaluation

Normal vessel filling wall to wall, compression and augmentation as well as

Colour flow down to the infrageniculate veins.



_______________________________________________________________________________



Interpretation Summary

No duplex evidence of DVT or obstruction in the left lower extremity nor in

the right Common Femoral vein.

_______________________________________________________________________________



Name: KALIE DANIEL

MRN: [id number]

Age: 58 yrs

Gender: Female                                       : 1960

Patient Status: Outpatient                           Patient Location: RAD

Account #: [id number]

Study Date: 2019 01:31 PM

                        Accession #: [id number]

Reason For Study: LLE LOCALIZED EDEMA

Ordering Physician: JOHANNA AGUILERA

Performed By: Yves Florence

Electronically signed by:      Lennox Williams      on 2019 04:09 PM



CC: JOHANNA AGUILERA

>

Williams, Lennox

## 2019-07-11 ENCOUNTER — HOSPITAL ENCOUNTER (EMERGENCY)
Dept: HOSPITAL 62 - ER | Age: 59
Discharge: HOME | End: 2019-07-11
Payer: MEDICARE

## 2019-07-11 VITALS — SYSTOLIC BLOOD PRESSURE: 115 MMHG | DIASTOLIC BLOOD PRESSURE: 61 MMHG

## 2019-07-11 DIAGNOSIS — E11.9: ICD-10-CM

## 2019-07-11 DIAGNOSIS — M79.89: Primary | ICD-10-CM

## 2019-07-11 DIAGNOSIS — R60.0: ICD-10-CM

## 2019-07-11 DIAGNOSIS — Z88.0: ICD-10-CM

## 2019-07-11 DIAGNOSIS — J45.909: ICD-10-CM

## 2019-07-11 DIAGNOSIS — I10: ICD-10-CM

## 2019-07-11 DIAGNOSIS — Z88.8: ICD-10-CM

## 2019-07-11 DIAGNOSIS — Z79.899: ICD-10-CM

## 2019-07-11 DIAGNOSIS — Z79.891: ICD-10-CM

## 2019-07-11 DIAGNOSIS — M79.604: ICD-10-CM

## 2019-07-11 DIAGNOSIS — E66.01: ICD-10-CM

## 2019-07-11 DIAGNOSIS — R30.0: ICD-10-CM

## 2019-07-11 DIAGNOSIS — G89.29: ICD-10-CM

## 2019-07-11 DIAGNOSIS — M79.605: ICD-10-CM

## 2019-07-11 LAB
ADD MANUAL DIFF: NO
ALBUMIN SERPL-MCNC: 4.1 G/DL (ref 3.5–5)
ALP SERPL-CCNC: 68 U/L (ref 38–126)
ALT SERPL-CCNC: 23 U/L (ref 9–52)
ANION GAP SERPL CALC-SCNC: 8 MMOL/L (ref 5–19)
APPEARANCE UR: (no result)
APTT PPP: (no result) S
AST SERPL-CCNC: 25 U/L (ref 14–36)
BASOPHILS # BLD AUTO: 0.1 10^3/UL (ref 0–0.2)
BASOPHILS NFR BLD AUTO: 1.2 % (ref 0–2)
BILIRUB DIRECT SERPL-MCNC: 0.3 MG/DL (ref 0–0.4)
BILIRUB SERPL-MCNC: 0.3 MG/DL (ref 0.2–1.3)
BILIRUB UR QL STRIP: NEGATIVE
BUN SERPL-MCNC: 15 MG/DL (ref 7–20)
CALCIUM: 9.7 MG/DL (ref 8.4–10.2)
CHLORIDE SERPL-SCNC: 107 MMOL/L (ref 98–107)
CO2 SERPL-SCNC: 27 MMOL/L (ref 22–30)
EOSINOPHIL # BLD AUTO: 0.1 10^3/UL (ref 0–0.6)
EOSINOPHIL NFR BLD AUTO: 1.2 % (ref 0–6)
ERYTHROCYTE [DISTWIDTH] IN BLOOD BY AUTOMATED COUNT: 19 % (ref 11.5–14)
GLUCOSE SERPL-MCNC: 154 MG/DL (ref 75–110)
GLUCOSE UR STRIP-MCNC: NEGATIVE MG/DL
HCT VFR BLD CALC: 35.7 % (ref 36–47)
HGB BLD-MCNC: 11.2 G/DL (ref 12–15.5)
KETONES UR STRIP-MCNC: NEGATIVE MG/DL
LYMPHOCYTES # BLD AUTO: 1.1 10^3/UL (ref 0.5–4.7)
LYMPHOCYTES NFR BLD AUTO: 14.9 % (ref 13–45)
MCH RBC QN AUTO: 23.9 PG (ref 27–33.4)
MCHC RBC AUTO-ENTMCNC: 31.3 G/DL (ref 32–36)
MCV RBC AUTO: 77 FL (ref 80–97)
MONOCYTES # BLD AUTO: 0.7 10^3/UL (ref 0.1–1.4)
MONOCYTES NFR BLD AUTO: 9.5 % (ref 3–13)
NEUTROPHILS # BLD AUTO: 5.5 10^3/UL (ref 1.7–8.2)
NEUTS SEG NFR BLD AUTO: 73.2 % (ref 42–78)
NITRITE UR QL STRIP: NEGATIVE
PH UR STRIP: 5 [PH] (ref 5–9)
PLATELET # BLD: 252 10^3/UL (ref 150–450)
POTASSIUM SERPL-SCNC: 4.4 MMOL/L (ref 3.6–5)
PROT SERPL-MCNC: 7.1 G/DL (ref 6.3–8.2)
PROT UR STRIP-MCNC: NEGATIVE MG/DL
RBC # BLD AUTO: 4.67 10^6/UL (ref 3.72–5.28)
SODIUM SERPL-SCNC: 141.9 MMOL/L (ref 137–145)
SP GR UR STRIP: 1.02
TOTAL CELLS COUNTED % (AUTO): 100 %
UROBILINOGEN UR-MCNC: NEGATIVE MG/DL (ref ?–2)
WBC # BLD AUTO: 7.6 10^3/UL (ref 4–10.5)

## 2019-07-11 PROCEDURE — 93971 EXTREMITY STUDY: CPT

## 2019-07-11 PROCEDURE — 80053 COMPREHEN METABOLIC PANEL: CPT

## 2019-07-11 PROCEDURE — 85025 COMPLETE CBC W/AUTO DIFF WBC: CPT

## 2019-07-11 PROCEDURE — 99284 EMERGENCY DEPT VISIT MOD MDM: CPT

## 2019-07-11 PROCEDURE — 71046 X-RAY EXAM CHEST 2 VIEWS: CPT

## 2019-07-11 PROCEDURE — 87086 URINE CULTURE/COLONY COUNT: CPT

## 2019-07-11 PROCEDURE — 81001 URINALYSIS AUTO W/SCOPE: CPT

## 2019-07-11 PROCEDURE — 83880 ASSAY OF NATRIURETIC PEPTIDE: CPT

## 2019-07-11 PROCEDURE — 36415 COLL VENOUS BLD VENIPUNCTURE: CPT

## 2019-07-11 NOTE — ER DOCUMENT REPORT
ED General





- General


Chief Complaint: Leg Pain


Stated Complaint: FOOT PAIN


Time Seen by Provider: 19 14:37


Primary Care Provider: 


JOHANNA AGUILERA MD [ACTIVE STAFF] - Follow up as needed


Mode of Arrival: Wheelchair


Notes: 


RME provider:


58-year-old female presents to ED for complaint of increase in her chronic pain 

to both legs.  She states the swelling is getting worse in both legs and that 

she is dragging her legs.  She does have a history of multiple autoimmune 

diseases to include lupus Sjogren's fibromyalgia.  She states she also has 

burning with her urination.  She states the pain is been there for a long time 

is just worse.  She is on chronic pain management of tramadol Zanaflex and 

oxycodone.  She states she has been compliant with her pain medicines.  Patient 

is alert oriented respirations regular and unlabored speaking in full sentences.





MY HPI:


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  States she is being 

referred to a specialist as "no one knows how to handle it."


Patient's complaint to me is that her left lower extremity is more swollen than 

normal with increased pain.  Patient's denying any increased swelling or pain in

her right lower extremity.  Patient states this pain is been ongoing for the 

last couple of weeks, but this morning she also noticed some dysuria which is 

why she decided to present to the emergency room.


TRAVEL OUTSIDE OF THE [place] IN LAST 30 DAYS: No





- Related Data


Allergies/Adverse Reactions: 


                                        





ibuprofen [Ibuprofen] Allergy (Severe, Verified 17 00:36)


   Chest pain


Penicillins Allergy (Verified 19 14:00)


   











Past Medical History





- General


Information source: Patient





- Social History


Smoking Status: Never Smoker


Frequency of alcohol use: None


Drug Abuse: None


Family History: Reviewed & Not Pertinent, CAD, DM, Hypertension


Patient has suicidal ideation: No


Patient has homicidal ideation: No





- Past Medical History


Cardiac Medical History: Reports: Hx Hypercholesterolemia, Hx Hypertension, Hx 

Heart Murmur -  systolic murmur


   Denies: Hx Atrial Fibrillation, Hx Congestive Heart Failure, Hx Coronary 

Artery Disease, Hx Heart Attack, Hx Peripheral Vascular Disease, Hx Pulmonary 

Embolism


Pulmonary Medical History: Reports: Hx Asthma, Hx Bronchitis, Hx Pneumonia, Hx 

Sleep Apnea


   Denies: Hx COPD, Hx Respiratory Failure, Hx Tuberculosis


Neurological Medical History: Reports: Hx Cerebrovascular Accident - .  

Denies: Hx Seizures


Endocrine Medical History: Reports: Hx Diabetes Mellitus Type 2.  Denies: Hx 

Graves' Disease, Hx Hyperthyroidism, Hx Hypothyroidism


Renal/ Medical History: Denies: Hx Peritoneal Dialysis


Malignancy Medical History: Denies: Hx Leukemia, Hx Lung Cancer


GI Medical History: Reports: Hx Crohn's Disease, Hx Hiatal Hernia, Hx 

Pancreatitis.  Denies: Hx Gastroesophageal Reflux Disease, Hx Irritable Bowel, 

Hx Liver Failure, Hx Ulcer


Musculoskeletal Medical History: Reports Hx Arthritis, Reports Hx Fibromyalgia, 

Denies Hx Multiple Sclerosis, Denies Hx Muscular Dystrophy, Reports Hx Systemic 

Lupus Erythematosus


Psychiatric Medical History: Reports: Hx Depression, Hx Personality Disorder


   Denies: Hx Bipolar Disorder, Hx Dementia, Hx Post Traumatic Stress Disorder, 

Hx Schizophrenia


Traumatic Medical History: Denies: Hx Fractures


Infectious Medical History: Denies: Hx HIV


Past Surgical History: Reports: Hx  Section, Hx Hysterectomy.  Denies: 

Hx Appendectomy, Hx Bowel Surgery, Hx Cholecystectomy, Hx Colostomy, Hx Coronary

 Artery Bypass Graft, Hx Gastric Bypass Surgery, Hx Herniorrhaphy, Hx 

Mastectomy, Hx Pacemaker, Hx Tonsillectomy, Hx Tubal Ligation





- Immunizations


Immunizations up to date: Yes


Hx Diphtheria, Pertussis, Tetanus Vaccination: Yes


Hx Pneumococcal Vaccination: 08





Review of Systems





- Review of Systems


Constitutional: denies: Fever, Weakness


EENT: No symptoms reported


Cardiovascular: No symptoms reported


Respiratory: No symptoms reported


Gastrointestinal: No symptoms reported


Genitourinary: See HPI


Female Genitourinary: No symptoms reported


Musculoskeletal: See HPI


Skin: See HPI


Hematologic/Lymphatic: See HPI


Neurological/Psychological: No symptoms reported





Physical Exam





- Vital signs


Vitals: 


                                        











Temp Pulse Resp BP Pulse Ox


 


 98 F   94   18   131/75 H  94 


 


 19 13:30  19 13:30  19 13:30  19 13:30  19 13:30














- Notes


Notes: 





GENERAL: Alert, interacts well. No acute distress.


HEAD: Normocephalic, atraumatic.


EYES: Pupils equal, round, and reactive to light. Extraocular movements intact.


ENT: Oral mucosa moist, tongue midline. 


NECK: Full range of motion. Supple. Trachea midline.


LUNGS: Clear to auscultation bilaterally, no wheezes, rales, or rhonchi. No 

respiratory distress.


HEART: Regular rate and rhythm. No murmur


ABDOMEN: Soft, non-tender. Non-distended. Bowel sounds present in all 4 

quadrants.


EXTREMITIES: Moves all 4 extremities spontaneously. normal radial and dorsalis 

pedis pulses bilaterally.  Generalized nonpitting edema noted bilateral lower 

extremities up to her thighs, more so on the left than the right. PMS present 

and equal BL.


BACK: no cervical, thoracic, lumbar midline tenderness. No saddle anesthesia, 

normal distal neurovascular exam. 


NEUROLOGICAL: Alert and oriented x3. Normal speech. cranial nerves II through 

XII grossly intact


PSYCH: Normal affect, normal mood.


SKIN: Warm, dry, normal turgor. No rashes or lesions noted.








Course





- Re-evaluation


Re-evalutation: 





19 19:46





Laboratory











  19





  14:44 14:44 14:44


 


WBC  7.6  


 


RBC  4.67  


 


Hgb  11.2 L  


 


Hct  35.7 L  


 


MCV  77 L  


 


MCH  23.9 L  


 


MCHC  31.3 L  


 


RDW  19.0 H  


 


Plt Count  252  


 


Seg Neutrophils %  73.2  


 


Lymphocytes %  14.9  


 


Monocytes %  9.5  


 


Eosinophils %  1.2  


 


Basophils %  1.2  


 


Absolute Neutrophils  5.5  


 


Absolute Lymphocytes  1.1  


 


Absolute Monocytes  0.7  


 


Absolute Eosinophils  0.1  


 


Absolute Basophils  0.1  


 


Sodium   141.9 


 


Potassium   4.4 


 


Chloride   107 


 


Carbon Dioxide   27 


 


Anion Gap   8 


 


BUN   15 


 


Creatinine   0.83 


 


Est GFR ( Amer)   > 60 


 


Est GFR (Non-Af Amer)   > 60 


 


Glucose   154 H 


 


Calcium   9.7 


 


Total Bilirubin   0.3 


 


Direct Bilirubin   0.3 


 


Neonat Total Bilirubin   Not Reportable 


 


Neonat Direct Bilirubin   Not Reportable 


 


Neonat Indirect Bili   Not Reportable 


 


AST   25 


 


ALT   23 


 


Alkaline Phosphatase   68 


 


NT-Pro-B Natriuret Pep    49


 


Total Protein   7.1 


 


Albumin   4.1 


 


Urine Color   


 


Urine Appearance   


 


Urine pH   


 


Ur Specific Gravity   


 


Urine Protein   


 


Urine Glucose (UA)   


 


Urine Ketones   


 


Urine Blood   


 


Urine Nitrite   


 


Urine Bilirubin   


 


Urine Urobilinogen   


 


Ur Leukocyte Esterase   


 


Urine WBC (Auto)   


 


Urine RBC (Auto)   


 


Squamous Epi Cells Auto   


 


Urine Mucus (Auto)   


 


Urine Ascorbic Acid   














  19





  14:44


 


WBC 


 


RBC 


 


Hgb 


 


Hct 


 


MCV 


 


MCH 


 


MCHC 


 


RDW 


 


Plt Count 


 


Seg Neutrophils % 


 


Lymphocytes % 


 


Monocytes % 


 


Eosinophils % 


 


Basophils % 


 


Absolute Neutrophils 


 


Absolute Lymphocytes 


 


Absolute Monocytes 


 


Absolute Eosinophils 


 


Absolute Basophils 


 


Sodium 


 


Potassium 


 


Chloride 


 


Carbon Dioxide 


 


Anion Gap 


 


BUN 


 


Creatinine 


 


Est GFR ( Amer) 


 


Est GFR (Non-Af Amer) 


 


Glucose 


 


Calcium 


 


Total Bilirubin 


 


Direct Bilirubin 


 


Neonat Total Bilirubin 


 


Neonat Direct Bilirubin 


 


Neonat Indirect Bili 


 


AST 


 


ALT 


 


Alkaline Phosphatase 


 


NT-Pro-B Natriuret Pep 


 


Total Protein 


 


Albumin 


 


Urine Color  SHANW


 


Urine Appearance  SLIGHTLY-CLOUDY


 


Urine pH  5.0


 


Ur Specific Gravity  1.023


 


Urine Protein  NEGATIVE


 


Urine Glucose (UA)  NEGATIVE


 


Urine Ketones  NEGATIVE


 


Urine Blood  NEGATIVE


 


Urine Nitrite  NEGATIVE


 


Urine Bilirubin  NEGATIVE


 


Urine Urobilinogen  NEGATIVE


 


Ur Leukocyte Esterase  NEGATIVE


 


Urine WBC (Auto)  1


 


Urine RBC (Auto)  0


 


Squamous Epi Cells Auto  5


 


Urine Mucus (Auto)  RARE


 


Urine Ascorbic Acid  NEGATIVE








Upon re-examination the Pt. is on her cell phone laughing. 


Preliminary venous Doppler shows no DVT.





Patient's labs show no signs of urinary tract infection.


Patient's declining a genitalia exam at this time.  States "there is no rash I 

will be fine."


Patient stable for discharge.  





This medical record was dictated with voice recognizing software.  There may be 

grammatical, syntax errors that are unintended.




















- Vital Signs


Vital signs: 


                                        











Temp Pulse Resp BP Pulse Ox


 


 98 F   94   18   131/75 H  94 


 


 19 13:30  19 13:30  19 13:30  19 13:30  19 13:30














- Laboratory


Result Diagrams: 


                                 19 14:44





                                 19 14:44


Laboratory results interpreted by me: 


                                        











  19





  14:44 14:44


 


Hgb  11.2 L 


 


Hct  35.7 L 


 


MCV  77 L 


 


MCH  23.9 L 


 


MCHC  31.3 L 


 


RDW  19.0 H 


 


Glucose   154 H














Discharge





- Discharge


Clinical Impression: 


 Swelling of lower leg, Morbid obesity with BMI of 50.0-59.9, adult





Condition: Stable


Disposition: HOME, SELF-CARE


Additional Instructions: 


As we discussed you have been seen and treated in the emergency department for 

left lower leg swelling.  Your ultrasound reveals no of blood clots.  The 

swelling is likely due to her diagnosis of lipedema.  Please make sure he 

continue to take your pain medications as prescribed.  Please also make sure he 

follow-up with your primary care provider for continued care.  Please return to 

the emergency room for any concerns.


Referrals: 


JOHANNA AGUILERA MD [ACTIVE STAFF] - Follow up as needed

## 2019-07-11 NOTE — RADIOLOGY REPORT (SQ)
EXAM DESCRIPTION:  CHEST 2 VIEWS



COMPLETED DATE/TIME:  7/11/2019 3:23 pm



REASON FOR STUDY:  Bilateral pedal edema



COMPARISON:  9/15/2018



EXAM PARAMETERS:  NUMBER OF VIEWS: two views

TECHNIQUE: Digital Frontal and Lateral radiographic views of the chest acquired.

RADIATION DOSE: NA

LIMITATIONS: none



FINDINGS:  LUNGS AND PLEURA: No opacities, masses or pneumothorax. No pleural effusion.

MEDIASTINUM AND HILAR STRUCTURES: No masses or contour abnormalities.

HEART AND VASCULAR STRUCTURES: Heart normal size.  No evidence for failure.

BONES: No acute findings.

HARDWARE: None in the chest.

OTHER: No other significant finding.



IMPRESSION:  NO ACUTE RADIOGRAPHIC FINDING IN THE CHEST.



TECHNICAL DOCUMENTATION:  JOB ID:  [id number]

 2011 [employer]- All Rights Reserved



Reading location - IP/workstation name: [email or web address]

## 2019-07-11 NOTE — ER DOCUMENT REPORT
ED Medical Screen (RME)





- General


Chief Complaint: Leg Pain


Stated Complaint: FOOT PAIN


Time Seen by Provider: 19 14:37


Primary Care Provider: 


JOHANNA AGUILERA MD [Primary Care Provider] - Follow up as needed


Mode of Arrival: Wheelchair


Information source: Patient


Notes: 





58-year-old female presents to ED for complaint of increase in her chronic pain 

to both legs.  She states the swelling is getting worse in both legs and that 

she is dragging her legs.  She does have a history of multiple autoimmune 

diseases to include lupus Sjogren's fibromyalgia.  She states she also has 

burning with her urination.  She states the pain is been there for a long time 

is just worse.  She is on chronic pain management of tramadol Zanaflex and 

oxycodone.  She states she has been compliant with her pain medicines.  Patient 

is alert oriented respirations regular and unlabored speaking in full sentences.














I have greeted and performed a rapid initial assessment of this patient.  A 

comprehensive ED assessment and evaluation of the patient, analysis of test 

results and completion of medical decision making process will be conducted by 

an additional ED providers.











Dictation of this chart was performed using voice recognition software; 

therefore, there may be some unintended grammatical errors.


TRAVEL OUTSIDE OF THE [place] IN LAST 30 DAYS: No





- Related Data


Allergies/Adverse Reactions: 


                                        





ibuprofen [Ibuprofen] Allergy (Severe, Verified 17 00:36)


   Chest pain


Penicillins Allergy (Verified 19 14:00)


   











Past Medical History





- Past Medical History


Cardiac Medical History: Reports: Hx Hypercholesterolemia, Hx Hypertension, Hx 

Heart Murmur - 1/6 systolic murmur


   Denies: Hx Atrial Fibrillation, Hx Congestive Heart Failure, Hx Coronary 

Artery Disease, Hx Heart Attack, Hx Peripheral Vascular Disease, Hx Pulmonary 

Embolism


Pulmonary Medical History: Reports: Hx Asthma, Hx Bronchitis, Hx Pneumonia, Hx 

Sleep Apnea


   Denies: Hx COPD, Hx Respiratory Failure, Hx Tuberculosis


Neurological Medical History: Reports: Hx Cerebrovascular Accident - .  

Denies: Hx Seizures


Endocrine Medical History: Reports: Hx Diabetes Mellitus Type 2.  Denies: Hx 

Graves' Disease, Hx Hyperthyroidism, Hx Hypothyroidism


Renal/ Medical History: Denies: Hx Peritoneal Dialysis


Malignancy Medical History: Denies: Hx Leukemia, Hx Lung Cancer


GI Medical History: Reports: Hx Crohn's Disease, Hx Hiatal Hernia, Hx 

Pancreatitis.  Denies: Hx Gastroesophageal Reflux Disease, Hx Irritable Bowel, 

Hx Liver Failure, Hx Ulcer


Musculoskeltal Medical History: Reports Hx Arthritis, Reports Hx Fibromyalgia, 

Denies Hx Multiple Sclerosis, Denies Hx Muscular Dystrophy, Reports Hx Systemic 

Lupus Erythematosus


Psychiatric Medical History: Reports: Hx Depression, Hx Personality Disorder


   Denies: Hx Bipolar Disorder, Hx Dementia, Hx Post Traumatic Stress Disorder, 

Hx Schizophrenia


Traumatic Medical History: Denies: Hx Fractures


Infectious Medical History: Denies: Hx HIV


Past Surgical History: Reports: Hx  Section, Hx Hysterectomy.  Denies: 

Hx Appendectomy, Hx Bowel Surgery, Hx Cholecystectomy, Hx Colostomy, Hx Coronary

 Artery Bypass Graft, Hx Gastric Bypass Surgery, Hx Herniorrhaphy, Hx 

Mastectomy, Hx Pacemaker, Hx Tonsillectomy, Hx Tubal Ligation





- Immunizations


Immunizations up to date: Yes


Hx Diphtheria, Pertussis, Tetanus Vaccination: Yes


History of Influenza Vaccine for 10/2017 - 3/2018 Season: Yes


Influenza Administration Date for 10/2017 - 3/2018 Season: 17





Physical Exam





- Vital signs


Vitals: 





                                        











Temp Pulse Resp BP Pulse Ox


 


 98 F   94   18   131/75 H  94 


 


 19 13:30  19 13:30  19 13:30  19 13:30  19 13:30














Course





- Vital Signs


Vital signs: 





                                        











Temp Pulse Resp BP Pulse Ox


 


 98 F   94   18   131/75 H  94 


 


 19 13:30  19 13:30  19 13:30  19 13:30  19 13:30














Doctor's Discharge





- Discharge


Referrals: 


JOHANNA AGUILERA MD [Primary Care Provider] - Follow up as needed

## 2019-08-14 ENCOUNTER — HOSPITAL ENCOUNTER (OUTPATIENT)
Dept: HOSPITAL 62 - RAD | Age: 59
End: 2019-08-14
Attending: PHYSICIAN ASSISTANT
Payer: MEDICARE

## 2019-08-14 DIAGNOSIS — R10.2: Primary | ICD-10-CM

## 2019-08-14 PROCEDURE — 76856 US EXAM PELVIC COMPLETE: CPT

## 2019-08-14 NOTE — RADIOLOGY REPORT (SQ)
EXAM DESCRIPTION:  U/S NON-OB PELVIS W/O DOP



COMPLETED DATE/TIME:  8/14/2019 10:21 am



REASON FOR STUDY:  PELVIC AND PERINEAL PAIN R10.2  PELVIC AND PERINEAL PAIN



COMPARISON:  None.



TECHNIQUE:  Dynamic and static grayscale images acquired of the pelvis via transabdominal approach an
d recorded on PACS. Additional selected color Doppler and spectral images recorded.



LIMITATIONS:  None.



FINDINGS:  UTERUS:  Surgically absent

RIGHT OVARY AND DOPPLER: Ovary not visualized.

LEFT OVARY AND DOPPLER: Ovary not visualized.

FREE FLUID: None noted.

OTHER: There is no focal finding in the right lower quadrant.



IMPRESSION:  Uterus surgically absent.  Ovaries not identified.

No finding in the right lower quadrant.



TECHNICAL DOCUMENTATION:  JOB ID:  [id number]

 2011 [employer] All Rights Reserved                          Rev-[id number]



Reading location - IP/workstation name: [email or web address]

## 2020-07-08 ENCOUNTER — HOSPITAL ENCOUNTER (EMERGENCY)
Dept: HOSPITAL 62 - ER | Age: 60
Discharge: HOME | End: 2020-07-08
Payer: MEDICARE

## 2020-07-08 VITALS — DIASTOLIC BLOOD PRESSURE: 64 MMHG | SYSTOLIC BLOOD PRESSURE: 126 MMHG

## 2020-07-08 DIAGNOSIS — Z79.02: ICD-10-CM

## 2020-07-08 DIAGNOSIS — I10: ICD-10-CM

## 2020-07-08 DIAGNOSIS — R06.02: ICD-10-CM

## 2020-07-08 DIAGNOSIS — R05: ICD-10-CM

## 2020-07-08 DIAGNOSIS — J44.9: ICD-10-CM

## 2020-07-08 DIAGNOSIS — Z20.828: ICD-10-CM

## 2020-07-08 DIAGNOSIS — Z88.0: ICD-10-CM

## 2020-07-08 DIAGNOSIS — E78.00: ICD-10-CM

## 2020-07-08 DIAGNOSIS — Z88.6: ICD-10-CM

## 2020-07-08 DIAGNOSIS — Z86.73: ICD-10-CM

## 2020-07-08 DIAGNOSIS — R07.89: Primary | ICD-10-CM

## 2020-07-08 LAB
%HYPO/RBC NFR BLD AUTO: (no result) %
ABSOLUTE LYMPHOCYTES# (MANUAL): 3.6 10^3/UL (ref 0.5–4.7)
ABSOLUTE MONOCYTES # (MANUAL): 0.4 10^3/UL (ref 0.1–1.4)
ADD MANUAL DIFF: YES
ALBUMIN SERPL-MCNC: 4 G/DL (ref 3.5–5)
ALP SERPL-CCNC: 75 U/L (ref 38–126)
ANION GAP SERPL CALC-SCNC: 9 MMOL/L (ref 5–19)
ANISOCYTOSIS BLD QL SMEAR: (no result)
AST SERPL-CCNC: 31 U/L (ref 14–36)
BASOPHILS NFR BLD MANUAL: 1 % (ref 0–2)
BILIRUB DIRECT SERPL-MCNC: 0.4 MG/DL (ref 0–0.4)
BILIRUB SERPL-MCNC: 0.4 MG/DL (ref 0.2–1.3)
BUN SERPL-MCNC: 31 MG/DL (ref 7–20)
CALCIUM: 9.3 MG/DL (ref 8.4–10.2)
CHLORIDE SERPL-SCNC: 102 MMOL/L (ref 98–107)
CK MB SERPL-MCNC: 0.31 NG/ML (ref ?–4.55)
CK SERPL-CCNC: 34 U/L (ref 30–135)
CO2 SERPL-SCNC: 27 MMOL/L (ref 22–30)
DACRYOCYTES BLD QL SMEAR: SLIGHT
EOSINOPHIL NFR BLD MANUAL: 0 % (ref 0–6)
ERYTHROCYTE [DISTWIDTH] IN BLOOD BY AUTOMATED COUNT: 21.8 % (ref 11.5–14)
GLUCOSE SERPL-MCNC: 166 MG/DL (ref 75–110)
HCT VFR BLD CALC: 34.9 % (ref 36–47)
HGB BLD-MCNC: 11.2 G/DL (ref 12–15.5)
MCH RBC QN AUTO: 24.4 PG (ref 27–33.4)
MCHC RBC AUTO-ENTMCNC: 32.1 G/DL (ref 32–36)
MCV RBC AUTO: 76 FL (ref 80–97)
MONOCYTES % (MANUAL): 4 % (ref 3–13)
NRBC BLD AUTO-RTO: 2 /100 WBC
OVALOCYTES BLD QL SMEAR: (no result)
PLATELET # BLD: 204 10^3/UL (ref 150–450)
PLATELET COMMENT: ADEQUATE
POIKILOCYTOSIS BLD QL SMEAR: (no result)
POLYCHROMASIA BLD QL SMEAR: (no result)
POTASSIUM SERPL-SCNC: 4.8 MMOL/L (ref 3.6–5)
PROT SERPL-MCNC: 7 G/DL (ref 6.3–8.2)
RBC # BLD AUTO: 4.58 10^6/UL (ref 3.72–5.28)
SEGMENTED NEUTROPHILS % (MAN): 55 % (ref 42–78)
TARGETS BLD QL SMEAR: SLIGHT
TOTAL CELLS COUNTED BLD: 100
TROPONIN I SERPL-MCNC: < 0.012 NG/ML
VARIANT LYMPHS NFR BLD MANUAL: 39 % (ref 13–45)
WBC # BLD AUTO: 9.1 10^3/UL (ref 4–10.5)

## 2020-07-08 PROCEDURE — 71045 X-RAY EXAM CHEST 1 VIEW: CPT

## 2020-07-08 PROCEDURE — 96361 HYDRATE IV INFUSION ADD-ON: CPT

## 2020-07-08 PROCEDURE — 84484 ASSAY OF TROPONIN QUANT: CPT

## 2020-07-08 PROCEDURE — 93005 ELECTROCARDIOGRAM TRACING: CPT

## 2020-07-08 PROCEDURE — 85025 COMPLETE CBC W/AUTO DIFF WBC: CPT

## 2020-07-08 PROCEDURE — C9803 HOPD COVID-19 SPEC COLLECT: HCPCS

## 2020-07-08 PROCEDURE — 96374 THER/PROPH/DIAG INJ IV PUSH: CPT

## 2020-07-08 PROCEDURE — 82550 ASSAY OF CK (CPK): CPT

## 2020-07-08 PROCEDURE — 93010 ELECTROCARDIOGRAM REPORT: CPT

## 2020-07-08 PROCEDURE — 80053 COMPREHEN METABOLIC PANEL: CPT

## 2020-07-08 PROCEDURE — 82553 CREATINE MB FRACTION: CPT

## 2020-07-08 PROCEDURE — 71275 CT ANGIOGRAPHY CHEST: CPT

## 2020-07-08 PROCEDURE — 87635 SARS-COV-2 COVID-19 AMP PRB: CPT

## 2020-07-08 PROCEDURE — 99285 EMERGENCY DEPT VISIT HI MDM: CPT

## 2020-07-08 PROCEDURE — 36415 COLL VENOUS BLD VENIPUNCTURE: CPT

## 2020-07-08 NOTE — RADIOLOGY REPORT (SQ)
EXAM DESCRIPTION: 



XR CHEST 1 VIEW



COMPLETED DATE/TME:  07/08/2020 02:17



CLINICAL HISTORY: chest pain



COMPARISON: None.



FINDINGS: Single frontal view of the chest. 



Cardiomediastinal silhouette: Normal size and contour. 

Lungs: No consolidation, pneumothorax, or pleural effusion. Low

lung volumes. Leads overlie the chest.

Bones: No acute osseous abnormality. 

Upper abdomen: No abnormality identified.



IMPRESSION:



1. No acute pulmonary process identified.

## 2020-07-08 NOTE — ER DOCUMENT REPORT
ED General





- General


Chief Complaint: Chest Pain


Stated Complaint: CHEST PAIN


Time Seen by Provider: 20 02:13


Notes: 





Patient is a 59-year-old female that comes emergency department for chief 

complaint of a cough for the past 2 days, pain across the top and front of both 

sides of her chest, and she states tonight after she went to bed the pain and 

the shortness of breath became worse so she came in for evaluation.  Patient 

states she did take albuterol just prior to arrival and she did feel better 

afterwards.  She reports a history of COPD, asthma, CHF, CVA on Plavix and 

aspirin, hypertension, rheumatoid arthritis, lupus, fibromyalgia.  She is on 

methotrexate.  She denies fever, nausea, vomiting, conrado pain, flank pain, 

headache.  She denies any recent travel or sick exposures.


TRAVEL OUTSIDE OF THE [place] IN LAST 30 DAYS: No





- Related Data


Allergies/Adverse Reactions: 


                                        





ibuprofen [Ibuprofen] Allergy (Severe, Verified 17 00:36)


   Chest pain


Penicillins Allergy (Verified 19 14:00)


   











Past Medical History





- General


Information source: Patient





- Social History


Smoking Status: Never Smoker


Chew tobacco use (# tins/day): No


Frequency of alcohol use: None


Drug Abuse: None


Lives with: Family


Family History: Reviewed & Not Pertinent, CAD, DM, Hypertension





- Past Medical History


Cardiac Medical History: Reports: Hx Hypercholesterolemia, Hx Hypertension, Hx 

Heart Murmur - / systolic murmur


   Denies: Hx Atrial Fibrillation, Hx Congestive Heart Failure, Hx Coronary 

Artery Disease, Hx Heart Attack, Hx Peripheral Vascular Disease, Hx Pulmonary 

Embolism


Pulmonary Medical History: Reports: Hx Asthma, Hx Bronchitis, Hx Pneumonia, Hx 

Sleep Apnea


   Denies: Hx COPD, Hx Respiratory Failure, Hx Tuberculosis


Neurological Medical History: Reports: Hx Cerebrovascular Accident - .  

Denies: Hx Seizures, Hx Parkinson's Disease


Endocrine Medical History: Reports: Hx Diabetes Mellitus Type 2.  Denies: Hx 

Graves' Disease, Hx Hyperthyroidism, Hx Hypothyroidism


Renal/ Medical History: Denies: Hx Peritoneal Dialysis


Malignancy Medical History: Denies: Hx Leukemia, Hx Lung Cancer


GI Medical History: Reports: Hx Crohn's Disease, Hx Hiatal Hernia, Hx 

Pancreatitis.  Denies: Hx Gastroesophageal Reflux Disease, Hx Irritable Bowel, 

Hx Liver Failure, Hx Ulcer


Musculoskeletal Medical History: Reports Hx Arthritis, Reports Hx Fibromyalgia, 

Denies Hx Multiple Sclerosis, Denies Hx Muscular Dystrophy, Reports Hx Systemic 

Lupus Erythematosus


Psychiatric Medical History: Reports: Hx Depression, Hx Personality Disorder


   Denies: Hx Bipolar Disorder, Hx Dementia, Hx Post Traumatic Stress Disorder, 

Hx Schizophrenia


Traumatic Medical History: Denies: Hx Fractures


Infectious Medical History: Denies: Hx HIV


Past Surgical History: Reports: Hx  Section, Hx Hysterectomy.  Denies: 

Hx Appendectomy, Hx Bowel Surgery, Hx Cholecystectomy, Hx Colostomy, Hx Coronary

 Artery Bypass Graft, Hx Gastric Bypass Surgery, Hx Herniorrhaphy, Hx 

Mastectomy, Hx Pacemaker, Hx Tonsillectomy, Hx Tubal Ligation





- Immunizations


Immunizations up to date: Yes


Hx Diphtheria, Pertussis, Tetanus Vaccination: Yes


Hx Pneumococcal Vaccination: 08





Review of Systems





- Review of Systems


Constitutional: No symptoms reported


EENT: No symptoms reported


Cardiovascular: See HPI


Respiratory: See HPI


Gastrointestinal: No symptoms reported


Genitourinary: No symptoms reported


Female Genitourinary: No symptoms reported


Musculoskeletal: No symptoms reported


Skin: No symptoms reported


Hematologic/Lymphatic: No symptoms reported


Neurological/Psychological: No symptoms reported





Physical Exam





- Vital signs


Vitals: 


                                        











Pulse Ox


 


 97 


 


 20 00:07














- Notes


Notes: 





GENERAL: Alert, interacts well. No acute distress.  Talkative, talking on her 

cell phone, no signs of distress


HEAD: Normocephalic, atraumatic.


EYES: Pupils equal, round, and reactive to light. Extraocular movements intact.


ENT: Oral mucosa moist, tongue midline. Oropharynx unremarkable. Airway patent. 


NECK: Full range of motion. Supple. Trachea midline. No lymphadenopathy.


LUNGS: Clear to auscultation bilaterally, no wheezes, rales, or rhonchi. No re

spiratory distress.  Patient does have specific and reproducible chest wall 

tenderness in her mid chest bilaterally.  No erythema, crepitus, or signs of 

trauma.  No severe tenderness.


HEART: Borderline tachycardia, normal rhythm, no murmur


ABDOMEN: Soft, non-tender. Non-distended. 


EXTREMITIES: Moves all 4 extremities spontaneously. No edema, normal radial and 

dorsalis pedis pulses bilaterally. No cyanosis.


BACK: no cervical, thoracic, lumbar midline tenderness. No saddle anesthesia, 

normal distal neurovascular exam. Moves all extremities in full range of motion.


NEUROLOGICAL: Alert and oriented x3. Normal speech. Cranial nerves II through 

XII grossly intact. Strength 5/5 in all extremities. 


PSYCH: Normal affect, normal mood.


SKIN: Warm, dry, normal turgor. No rashes or lesions noted.





Course





- Re-evaluation


Re-evalutation: 


Patient is well-appearing on exam.  She is alert, conversational, talking on her

 cell phone.  She does have very specific and reproducible chest wall tenderness

 which is mild.  She does have tachycardia, she is not hypoxic, lungs are clear.

  She has had a cough for couple of days now, she is a mild cough on exam.  

Chest x-ray unremarkable, CBC unremarkable, chemistry unremarkable, troponin is 

not elevated.  On reevaluation patient continues to be tachycardic, she tells me

 that she is supposed to have a CT of the chest with her primary provider 

because she had an "abnormal appearance of the left lung" on her chest x-ray.  

Decision was made to proceed with CTA to rule out pulmonary emboli given 

patient's tachycardia, shortness of breath, chest pain, and autoimmune 

condition.





CT of the chest is unremarkable.  Troponin cycled and negative.  Discussed with 

patient.  Patient is very happy with these results, she is requesting a CD of 

her report and discharge.  Because of her immunocompromise condition patient 

will be tested for coronavirus as well after discussion of options.  Very low 

suspicion of ACS given patient's symptoms, patient stable for discharge with 

return precautions which were discussed.  Patient states understanding and 

agreement.  Stable and well-appearing at time of discharge, patient is not 

tachycardic after 500 cc fluid bolus earlier.





- Vital Signs


Vital signs: 


                                        











Temp Pulse Resp BP Pulse Ox


 


       16   122/80   97 


 


       20 07:01  20 07:01  20 07:01














- Laboratory


Result Diagrams: 


                                 20 00:20





                                 20 00:20


Laboratory results interpreted by me: 


                                        











  20





  00:20 00:20


 


Hgb  11.2 L 


 


Hct  34.9 L 


 


MCV  76 L 


 


MCH  24.4 L 


 


RDW  21.8 H 


 


BUN   31 H


 


Creatinine   1.35 H


 


Est GFR ( Amer)   49 L


 


Est GFR (MDRD) Non-Af   40 L


 


Glucose   166 H














- EKG Interpretation by Me


Additional EKG results interpreted by me: 


EKG shows sinus tachycardia at a rate of 113, PVCs are present, QTC of 467, WI 

interval of 184.  No T wave inversions or ST segment changes in consecutive 

leads.





Discharge





- Discharge


Clinical Impression: 


 Cough, Shortness of breath, Chest wall pain





Chest pain


Qualifiers:


 Chest pain type: unspecified Qualified Code(s): R07.9 - Chest pain, unspecified





Condition: Stable


Disposition: HOME, SELF-CARE


Additional Instructions: 


Your work-up tonight is reassuring, no pneumonia, blood clot, or other 

concerning finding is seen.  Please follow-up with your provider, bring the copy

 of your CTA (CAT scan of the chest with contrast).  You have been tested for 

the coronavirus as well, you will be contacted with the results and additional 

instructions, see additional instructions below.  Return if you worsen including

 severe worsening pain, spiking fevers, difficulty breathing, or any other 

concerning symptoms.


______________





As a person under investigation for COVID-19, the [place] Department of 

Health and Human Services (division on public health) advises you to adhere to 

the following guidance until your test results are reported to you. If your test

 result is positive, you will receive additional information from your provider 

and your local health department at that time. 





Remain at home until you are cleared by the health provider or public health 

authorities. Keep a log of visitors to your home, notify any visitors to your 

home of your isolation status. 





If you plan to move to a new address or leave the county, notify the local 

health department in your County.





Call your Doctor or seek care if you have an urgent medical need. Before seeking

 medical care, call him to get instructions from the provider before arriving at

 the medical office, clinic, or hospital. Notify them that you are being tested 

for the virus (COVID-19) so that arrangements can be made, as necessary, to 

prevent transmission to others in the healthcare setting. Next, notify the local

 health department in your county. 





If a medical emergency arises and you need to call 911, inform the first 

responders that you are being tested for the virus that causes COVID-19. Next, 

notify the local health department in your county.

## 2020-07-08 NOTE — EKG REPORT
SEVERITY:- BORDERLINE ECG -

SINUS TACHYCARDIA

:

Confirmed by: Garett Johnston MD 08-Jul-2020 07:30:48

## 2020-07-08 NOTE — RADIOLOGY REPORT (SQ)
CT ANGIOGRAM CHEST WITH IV CONTRAST: 7/8/2020 5:39 AM CDT



HISTORY: 59-year old patient with tachycardia, dyspnea, chest

pain.



TECHNIQUE:  Postcontrast CT through the chest was performed per

protocol for CT angiography.  3D Multiplanar reformations were

performed at the workstation. Reconstructed sagittal and coronal

images were also obtained through the chest. This exam was

performed according to our departmental dose-optimization

program, which includes automated exposure control, adjustment of

the mA and/or KV according to the patient's size and/or use of

iterative reconstruction technique.



COMPARISON: None available



FINDINGS: The heart size is within normal limits of size.  



No significant mediastinal, supraclavicular, or axillary

lymphadenopathy is seen. 



The thoracic aorta is within normal limits of size. 



No filling defects are seen within the pulmonary arteries to

suggest a pulmonary artery embolism. The main pulmonary artery is

within normal limits of size.



The thyroid gland is unremarkable. 



The central tracheobronchial tree is patent. 



There is no evidence of a focal consolidative airspace opacity. 

The lung bases were not fully included on this examination.



There is no evidence of pleural effusions or a pneumothorax.  



The bones demonstrate no suspicious lytic or blastic lesion. 



The visualized hepatic parenchyma is diffusely low in

attenuation, suggesting hepatic steatosis.



IMPRESSION: 

No acute airspace opacities are seen.



No filling defect is seen to suggest a pulmonary artery embolism.



Hepatic steatosis

## 2020-08-18 ENCOUNTER — HOSPITAL ENCOUNTER (EMERGENCY)
Dept: HOSPITAL 62 - ER | Age: 60
Discharge: HOME | End: 2020-08-18
Payer: MEDICARE

## 2020-08-18 VITALS — SYSTOLIC BLOOD PRESSURE: 105 MMHG | DIASTOLIC BLOOD PRESSURE: 70 MMHG

## 2020-08-18 DIAGNOSIS — M06.9: Primary | ICD-10-CM

## 2020-08-18 DIAGNOSIS — R06.02: ICD-10-CM

## 2020-08-18 DIAGNOSIS — Z88.0: ICD-10-CM

## 2020-08-18 DIAGNOSIS — R05: ICD-10-CM

## 2020-08-18 DIAGNOSIS — Z79.899: ICD-10-CM

## 2020-08-18 DIAGNOSIS — R11.0: ICD-10-CM

## 2020-08-18 DIAGNOSIS — L93.0: ICD-10-CM

## 2020-08-18 DIAGNOSIS — E11.9: ICD-10-CM

## 2020-08-18 DIAGNOSIS — I11.0: ICD-10-CM

## 2020-08-18 DIAGNOSIS — I50.9: ICD-10-CM

## 2020-08-18 DIAGNOSIS — R07.9: ICD-10-CM

## 2020-08-18 DIAGNOSIS — J45.909: ICD-10-CM

## 2020-08-18 DIAGNOSIS — Z79.02: ICD-10-CM

## 2020-08-18 DIAGNOSIS — Z88.8: ICD-10-CM

## 2020-08-18 LAB
%HYPO/RBC NFR BLD AUTO: SLIGHT %
ABSOLUTE LYMPHOCYTES# (MANUAL): 0.4 10^3/UL (ref 0.5–4.7)
ABSOLUTE MONOCYTES # (MANUAL): 0.8 10^3/UL (ref 0.1–1.4)
ADD MANUAL DIFF: YES
ALBUMIN SERPL-MCNC: 5.1 G/DL (ref 3.5–5)
ALP SERPL-CCNC: 99 U/L (ref 38–126)
ANION GAP SERPL CALC-SCNC: 16 MMOL/L (ref 5–19)
ANISOCYTOSIS BLD QL SMEAR: (no result)
AST SERPL-CCNC: 33 U/L (ref 14–36)
BASOPHILS NFR BLD MANUAL: 0 % (ref 0–2)
BILIRUB SERPL-MCNC: 0.6 MG/DL (ref 0.2–1.3)
BUN SERPL-MCNC: 20 MG/DL (ref 7–20)
CALCIUM: 10.1 MG/DL (ref 8.4–10.2)
CHLORIDE SERPL-SCNC: 94 MMOL/L (ref 98–107)
CO2 SERPL-SCNC: 28 MMOL/L (ref 22–30)
DACRYOCYTES BLD QL SMEAR: SLIGHT
EOSINOPHIL NFR BLD MANUAL: 1 % (ref 0–6)
ERYTHROCYTE [DISTWIDTH] IN BLOOD BY AUTOMATED COUNT: 22.1 % (ref 11.5–14)
GLUCOSE SERPL-MCNC: 376 MG/DL (ref 75–110)
HCT VFR BLD CALC: 41.5 % (ref 36–47)
HGB BLD-MCNC: 13.2 G/DL (ref 12–15.5)
MCH RBC QN AUTO: 25 PG (ref 27–33.4)
MCHC RBC AUTO-ENTMCNC: 31.8 G/DL (ref 32–36)
MCV RBC AUTO: 79 FL (ref 80–97)
MONOCYTES % (MANUAL): 9 % (ref 3–13)
NRBC BLD AUTO-RTO: 2 /100 WBC
NT PRO BNP: 60 PG/ML (ref ?–125)
OVALOCYTES BLD QL SMEAR: SLIGHT
PLATELET # BLD: 339 10^3/UL (ref 150–450)
PLATELET CLUMP BLD QL SMEAR: PRESENT
PLATELET COMMENT: ADEQUATE
POIKILOCYTOSIS BLD QL SMEAR: (no result)
POLYCHROMASIA BLD QL SMEAR: SLIGHT
POTASSIUM SERPL-SCNC: 3.9 MMOL/L (ref 3.6–5)
PROT SERPL-MCNC: 8.8 G/DL (ref 6.3–8.2)
RBC # BLD AUTO: 5.28 10^6/UL (ref 3.72–5.28)
SCHISTOCYTES BLD QL SMEAR: SLIGHT
SEGMENTED NEUTROPHILS % (MAN): 85 % (ref 42–78)
TARGETS BLD QL SMEAR: (no result)
TOTAL CELLS COUNTED BLD: 100
TROPONIN I SERPL-MCNC: < 0.012 NG/ML
VARIANT LYMPHS NFR BLD MANUAL: 5 % (ref 13–45)
WBC # BLD AUTO: 8.5 10^3/UL (ref 4–10.5)
WBC TOXIC VACUOLES BLD QL SMEAR: PRESENT

## 2020-08-18 PROCEDURE — 93005 ELECTROCARDIOGRAM TRACING: CPT

## 2020-08-18 PROCEDURE — 83735 ASSAY OF MAGNESIUM: CPT

## 2020-08-18 PROCEDURE — 36415 COLL VENOUS BLD VENIPUNCTURE: CPT

## 2020-08-18 PROCEDURE — 96376 TX/PRO/DX INJ SAME DRUG ADON: CPT

## 2020-08-18 PROCEDURE — 93010 ELECTROCARDIOGRAM REPORT: CPT

## 2020-08-18 PROCEDURE — 85025 COMPLETE CBC W/AUTO DIFF WBC: CPT

## 2020-08-18 PROCEDURE — 85379 FIBRIN DEGRADATION QUANT: CPT

## 2020-08-18 PROCEDURE — 71045 X-RAY EXAM CHEST 1 VIEW: CPT

## 2020-08-18 PROCEDURE — 82962 GLUCOSE BLOOD TEST: CPT

## 2020-08-18 PROCEDURE — 96361 HYDRATE IV INFUSION ADD-ON: CPT

## 2020-08-18 PROCEDURE — 96375 TX/PRO/DX INJ NEW DRUG ADDON: CPT

## 2020-08-18 PROCEDURE — 80053 COMPREHEN METABOLIC PANEL: CPT

## 2020-08-18 PROCEDURE — 84484 ASSAY OF TROPONIN QUANT: CPT

## 2020-08-18 PROCEDURE — 99285 EMERGENCY DEPT VISIT HI MDM: CPT

## 2020-08-18 PROCEDURE — 83880 ASSAY OF NATRIURETIC PEPTIDE: CPT

## 2020-08-18 PROCEDURE — 96374 THER/PROPH/DIAG INJ IV PUSH: CPT

## 2020-08-18 PROCEDURE — 71275 CT ANGIOGRAPHY CHEST: CPT

## 2020-08-18 NOTE — ER DOCUMENT REPORT
ED Respiratory Problem





- General


Chief Complaint: Shortness Of Breath


Stated Complaint: SHORTNESS OF BREATH


Time Seen by Provider: 20 15:11


Primary Care Provider: 


KAYLEY CHILDS PA-C [Primary Care Provider] - Follow up as needed


AKHIL MONTIEL MD [ACTIVE STAFF] - Follow up tomorrow


Information source: Patient


Notes: 





Patient presents complaining of shortness of breath for the past week with a 

cough.  Patient complains of nausea without any vomiting.  Patient states she 

had diarrhea a few days ago but none today.  Patient states that she has had 

chest discomfort for the past week.  Patient reports a history of CHF but denies

any significant fluid retention.  Patient states that she does take Ecotrin and 

Plavix daily.  Patient has a history of rheumatoid arthritis and lupus as well.


TRAVEL OUTSIDE OF THE [place] IN LAST 30 DAYS: No





- HPI


Patient complains to provider of: Chest pain, Cough, Short of breath


Duration: Continuous


Pain Level: 5


Context: Hx asthma, Hx CHF.  denies: Smoker


Short of Breath: Mild


Cough: Nonproductive


Associated symptoms: Chest pain/discomfort, Cough, Short of breath.  denies: 

Congestion, Fever, Wheezing


Similar symptoms previously: Yes


Recently seen / treated by doctor: No





- Related Data


Allergies/Adverse Reactions: 


                                        





ibuprofen [Ibuprofen] Allergy (Severe, Verified 17 00:36)


   Chest pain


Penicillins Allergy (Verified 19 14:00)


   











Past Medical History





- General


Information source: Patient





- Social History


Smoking Status: Never Smoker


Frequency of alcohol use: None


Drug Abuse: None


Occupation: None


Lives with: Family


Family History: Reviewed & Not Pertinent, CAD, DM, Hypertension





- Past Medical History


Cardiac Medical History: Reports: Hx Congestive Heart Failure, Hx 

Hypercholesterolemia, Hx Hypertension, Hx Heart Murmur -  systolic murmur


Pulmonary Medical History: Reports: Hx Asthma, Hx Bronchitis, Hx Pneumonia, Hx 

Sleep Apnea


   Denies: Hx COPD, Hx Respiratory Failure, Hx Tuberculosis


Neurological Medical History: Reports: Hx Cerebrovascular Accident - .  

Denies: Hx Seizures, Hx Parkinson's Disease


Endocrine Medical History: Reports: Hx Diabetes Mellitus Type 2


Renal/ Medical History: Denies: Hx Peritoneal Dialysis


GI Medical History: Reports: Hx Crohn's Disease, Hx Hiatal Hernia, Hx 

Pancreatitis.  Denies: Hx Gastroesophageal Reflux Disease, Hx Irritable Bowel, 

Hx Liver Failure, Hx Ulcer


Musculoskeletal Medical History: Reports Hx Arthritis, Reports Hx Fibromyalgia, 

Denies Hx Multiple Sclerosis, Denies Hx Muscular Dystrophy, Reports Hx Systemic 

Lupus Erythematosus, Reports Other - Rheumatoid arthritis


Psychiatric Medical History: Reports: Hx Depression, Hx Personality Disorder


Traumatic Medical History: Denies: Hx Fractures


Infectious Medical History: Denies: Hx HIV


Past Surgical History: Reports: Hx  Section, Hx Hysterectomy





- Immunizations


Immunizations up to date: Yes


Hx Diphtheria, Pertussis, Tetanus Vaccination: Yes


Hx Pneumococcal Vaccination: 08





Review of Systems





- Review of Systems


Constitutional: No symptoms reported.  denies: Fever, Recent illness


EENT: No symptoms reported


Cardiovascular: Chest pain


Respiratory: Cough, Short of breath


Gastrointestinal: Nausea.  denies: Abdominal pain, Vomiting


Genitourinary: No symptoms reported


Female Genitourinary: No symptoms reported


Musculoskeletal: Back pain - Lateral side back pain


Skin: No symptoms reported


Hematologic/Lymphatic: No symptoms reported


Neurological/Psychological: No symptoms reported





Physical Exam





- Vital signs


Vitals: 


                                        











Temp Pulse Resp BP Pulse Ox


 


 97.9 F   109 H  22 H  131/88 H  98 


 


 20 14:44  20 14:44  20 14:44  20 14:44  20 14:44














- General


General appearance: Appears well, Alert


In distress: None





- HEENT


Head: Normocephalic, Atraumatic


Eyes: Normal


Conjunctiva: Normal


Nasal: Normal


Mouth/Lips: Normal


Mucous membranes: Normal


Neck: Normal, Supple.  No: Lymphadenopathy





- Respiratory


Respiratory status: No respiratory distress


Chest status: Tender, Pain on movement, Pain with cough, Pain with deep 

breathing


Breath sounds: Normal


Chest palpation: Tender





- Cardiovascular


Rhythm: Tachycardia


Heart sounds: S1 appreciated, S2 appreciated





- Abdominal


Inspection: Morbidly Obese


Distension: No distension


Bowel sounds: Normal


Tenderness: Nontender


Organomegaly: No organomegaly





- Back


Back: Normal, Nontender.  No: CVA tenderness





- Extremities


General lower extremity: Edema - 1+





- Neurological


Neuro grossly intact: Yes


Cognition: Normal


Sneads Coma Scale Eye Opening: Spontaneous


Rao Coma Scale Verbal: Oriented


Rao Coma Scale Motor: Obeys Commands


Rao Coma Scale Total: 15





- Psychological


Associated symptoms: Normal affect, Normal mood





- Skin


Skin Temperature: Warm


Skin Moisture: Dry


Skin Color: Normal





Course





- Re-evaluation


Re-evalutation: 





20 21:16


Patient states that her pain symptoms are persisting at this time.  Patient 

complains of pain all areas of the anterior and lateral chest bilaterally.  Pain

 is reproducible with palpation and movement of the trunk.  Patient with no 

acute ischemic changes on EKG and no dynamic changes as compared to prior.  

Patient with no elevation in troponin or delta troponin.  Patient without any PE

 noted on CTA.  Consulted with cardiologist Dr. Montiel regarding patient 

presentation and concern regarding her multiple comorbidities.  He does not 

recommend inpatient stress testing patient at this time and recommends having 

her follow-up on an outpatient basis.  He states that his office will contact 

her tomorrow to set up an appointment for outpatient follow-up.  Dr. Montiel does

 not feel that she has a cardiac source to her pain symptoms as they are 

reproducible and involve the entire chest area bilaterally.  Patient does have a

 history of lupus and rheumatoid arthritis and he does have concerns about 

possible rheumatoid flare.


20 21:20


Presentation of chest pain in an otherwise well appearing patient. Low clinical 

suspicion for ACS given clinical history, exam, EKG without ST elevations or 

depressions, and negative initial and delta troponin. No PE on CTA. CXR without 

evidence of pneumothorax or pneumonia. No widened mediastinum. 








Chest pain in a patient without evidence of cardiac or other serious etiology on

 workup today. I discussed with patient that, based on their age, risk factors 

and emergency department testing today, the likelihood that their symptoms are 

related to a heart attack is very low.  The patient demonstrates decision making

 capacity and has verbalized an understanding of these risks to me. Based on 

this,  the patient has chosen to follow-up as an outpatient. Usual chest pain 

return precautions reviewed. The patient states understanding and agreement with

 this plan.











- Vital Signs


Vital signs: 


                                        











Temp Pulse Resp BP Pulse Ox


 


 98.1 F   109 H  15   105/70   99 


 


 20 21:37  20 14:44  20 21:37  20 21:37  20 21:36














- Laboratory


Result Diagrams: 


                                 20 16:06





                                 20 16:06


Laboratory results interpreted by me: 


                                        











  20





  16:06 16:06 16:06


 


MCV  79 L  


 


MCH  25.0 L  


 


MCHC  31.8 L  


 


RDW  22.1 H  


 


Seg Neuts % (Manual)  85 H  


 


Lymphocytes % (Manual)  5 L  


 


Abs Lymphs (Manual)  0.4 L  


 


D-Dimer   0.55 H 


 


Chloride    94 L


 


Est GFR (MDRD) Non-Af    53 L


 


Glucose    376 H


 


POC Glucose   


 


Total Protein    8.8 H


 


Albumin    5.1 H














  20





  21:03


 


MCV 


 


MCH 


 


MCHC 


 


RDW 


 


Seg Neuts % (Manual) 


 


Lymphocytes % (Manual) 


 


Abs Lymphs (Manual) 


 


D-Dimer 


 


Chloride 


 


Est GFR (MDRD) Non-Af 


 


Glucose 


 


POC Glucose  226 H


 


Total Protein 


 


Albumin 











                              Labs- All tests 24 hr











  20





  16:06 16:06 16:06


 


WBC  8.5  


 


RBC  5.28  


 


Hgb  13.2  


 


Hct  41.5  


 


MCV  79 L  


 


MCH  25.0 L  


 


MCHC  31.8 L  


 


RDW  22.1 H  


 


Plt Count  339  


 


Lymph % (Auto)  Not Reportable  


 


Mono % (Auto)  Not Reportable  


 


Eos % (Auto)  Not Reportable  


 


Baso % (Auto)  Not Reportable  


 


Absolute Neuts (auto)  Not Reportable  


 


Absolute Lymphs (auto)  Not Reportable  


 


Absolute Monos (auto)  Not Reportable  


 


Absolute Eos (auto)  Not Reportable  


 


Absolute Basos (auto)  Not Reportable  


 


Total Counted  100  


 


Seg Neutrophils %  Not Reportable  


 


Seg Neuts % (Manual)  85 H  


 


Lymphocytes % (Manual)  5 L  


 


Monocytes % (Manual)  9  


 


Eosinophils % (Manual)  1  


 


Basophils % (Manual)  0  


 


Abs Neuts (Manual)  7.2  


 


Abs Lymphs (Manual)  0.4 L  


 


Abs Monocytes (Manual)  0.8  


 


Absolute Eos (Manual)  0.1  


 


Abs Basophils (Manual)  0.0  


 


Nucleated RBCs  2  


 


Toxic Vacuolation  PRESENT  


 


Clumped Platelets  PRESENT  


 


Platelet Comment  ADEQUATE  


 


Polychromasia  SLIGHT  


 


Hypochromasia  SLIGHT  


 


Poikilocytosis  2+  


 


Anisocytosis  3+  


 


Microcytosis  SLIGHT  


 


Target Cells  1+  


 


Tear Drop Cells  SLIGHT  


 


Ovalocytes  SLIGHT  


 


Schistocytes  SLIGHT  


 


D-Dimer   0.55 H 


 


Sodium    138.1


 


Potassium    3.9


 


Chloride    94 L


 


Carbon Dioxide    28


 


Anion Gap    16


 


BUN    20


 


Creatinine    1.06


 


Est GFR ( Amer)    > 60


 


Est GFR (MDRD) Non-Af    53 L


 


Glucose    376 H


 


POC Glucose   


 


Calcium    10.1


 


Magnesium    1.7


 


Total Bilirubin    0.6


 


Direct Bilirubin    Not Reportable


 


Neonat Total Bilirubin    Not Reportable


 


Neonat Direct Bilirubin    Not Reportable


 


Neonat Indirect Bili    Not Reportable


 


AST    33


 


ALT    21


 


Alkaline Phosphatase    99


 


Troponin I   


 


NT-Pro-B Natriuret Pep   


 


Total Protein    8.8 H


 


Albumin    5.1 H














  20





  16:06 19:57 21:03


 


WBC   


 


RBC   


 


Hgb   


 


Hct   


 


MCV   


 


MCH   


 


MCHC   


 


RDW   


 


Plt Count   


 


Lymph % (Auto)   


 


Mono % (Auto)   


 


Eos % (Auto)   


 


Baso % (Auto)   


 


Absolute Neuts (auto)   


 


Absolute Lymphs (auto)   


 


Absolute Monos (auto)   


 


Absolute Eos (auto)   


 


Absolute Basos (auto)   


 


Total Counted   


 


Seg Neutrophils %   


 


Seg Neuts % (Manual)   


 


Lymphocytes % (Manual)   


 


Monocytes % (Manual)   


 


Eosinophils % (Manual)   


 


Basophils % (Manual)   


 


Abs Neuts (Manual)   


 


Abs Lymphs (Manual)   


 


Abs Monocytes (Manual)   


 


Absolute Eos (Manual)   


 


Abs Basophils (Manual)   


 


Nucleated RBCs   


 


Toxic Vacuolation   


 


Clumped Platelets   


 


Platelet Comment   


 


Polychromasia   


 


Hypochromasia   


 


Poikilocytosis   


 


Anisocytosis   


 


Microcytosis   


 


Target Cells   


 


Tear Drop Cells   


 


Ovalocytes   


 


Schistocytes   


 


D-Dimer   


 


Sodium   


 


Potassium   


 


Chloride   


 


Carbon Dioxide   


 


Anion Gap   


 


BUN   


 


Creatinine   


 


Est GFR ( Amer)   


 


Est GFR (MDRD) Non-Af   


 


Glucose   


 


POC Glucose    226 H


 


Calcium   


 


Magnesium   


 


Total Bilirubin   


 


Direct Bilirubin   


 


Neonat Total Bilirubin   


 


Neonat Direct Bilirubin   


 


Neonat Indirect Bili   


 


AST   


 


ALT   


 


Alkaline Phosphatase   


 


Troponin I  < 0.012  < 0.012 


 


NT-Pro-B Natriuret Pep  60  


 


Total Protein   


 


Albumin   














- Diagnostic Test


Radiology reviewed: Reports reviewed





- EKG Interpretation by Me


EKG shows normal: Sinus rhythm


Rate: Tachycardia


Rhythm: NSR


When compared to previous EKG there are: No significant change


Additional EKG results interpreted by me: 





20 15:48


Sinus tachycardia with a rate of 106, QTc 463, no acute ischemic changes, no 

significant changes as compared to prior





Discharge





- Discharge


Clinical Impression: 


Chest pain


Qualifiers:


 Chest pain type: unspecified Qualified Code(s): R07.9 - Chest pain, unspecified





Rheumatoid arthritis


Qualifiers:


 Rheumatoid arthritis location: unspecified site Rheumatoid factor presence: 

unspecified presence Qualified Code(s): M06.9 - Rheumatoid arthritis, 

unspecified





Lupus


Qualifiers:


 Lupus erythematosus form: unspecified Qualified Code(s): L93.0 - Discoid lupus 

erythematosus





Condition: Stable


Disposition: HOME, SELF-CARE


Instructions:  Chest Pain of Unclear Cause ([facility])


Additional Instructions: 


Return immediately for any new or worsening symptoms





Followup with your primary care provider, call tomorrow to make a followup 

appointment





Cardiologist, Dr. Akhil Montiel, will have his office staff contact you tomorrow 

to make a follow-up appointment for outpatient follow-up.








You were seen today for chest pain.  The exact cause of your pain is unclear. 

However, based on your cardiac enzyme testing, chest x-ray, and EKG it does not 

appear that it is from an immediately life-threatening cause at this time.  

Although your testing here is normal is critical that you follow-up with your 

primary care physician for continued evaluation of this chest pain and possible 

stress testing.  I recommended you see your physician within the next 24-48 

hours to be evaluated for consideration of a stress test.  Please return to 

emergency department immediately if you have worsening of your chest pain, 

shortness of breath, vomiting, become unable to exert yourself due to pain or 

difficulty breathing, you pass out, or have any pain that radiates into your 

arms, jaw, or back. Please also return if you have any additional symptoms that 

are concerning to you.








Referrals: 


KAYLEY CHILDS PA-C [Primary Care Provider] - Follow up as needed


AKHIL MONTIEL MD [ACTIVE STAFF] - Follow up tomorrow

## 2020-08-18 NOTE — RADIOLOGY REPORT (SQ)
EXAM DESCRIPTION:  CTA CHEST



IMAGES COMPLETED DATE/TIME:  8/18/2020 6:52 pm



REASON FOR STUDY:  cp, elevated dimer



COMPARISON:  7/8/2020



TECHNIQUE:  CT scan of the chest performed using helical scanning technique with dynamic intravenous 
contrast injection.  Images reviewed with lung, soft tissue and bone windows.  Reconstructed coronal 
and sagittal MPR images reviewed.

Additional 3 dimensional post-processing performed to develop Maximal Intensity Projection images (MI
P).  All images stored on PACS.

All CT scanners at this facility use dose modulation, iterative reconstruction, and/or weight based d
osing when appropriate to reduce radiation dose to as low as reasonably achievable (ALARA).

[facility]: Dose Right  [facility]: CareDose    [facility]: Dose Right    [facility]: Teradose 4D    [facility]: [employer] Technologies



CONTRAST TYPE AND DOSE:  contrast/concentration: Isovue 350.00 mmol/ml; Total Contrast Delivered: 75.
0 ml; Total Saline Delivered: 38.2 ml

Contrast bolus adequate for pulmonary arteries and aorta.



RENAL FUNCTION:  BUN 20 creatinine 1.06



RADIATION DOSE:  CT Rad equipment meets quality standard of care and radiation dose reduction techniq
ues were employed. CTDIvol: 13.2 - 40.0 mGy. DLP: 1388 mGy-cm. .



LIMITATIONS:  None.



FINDINGS:  LUNGS AND PLEURA: No masses, infiltrates, or pneumothorax.  No pleural effusions or pleura
l calcifications.

AORTA AND GREAT VESSELS: No aneurysm. No dissection.

HEART: No pericardial effusion. No significant coronary artery calcifications.

PULMONARY ARTERIES: No emboli visualized in the main pulmonary arteries or the segmental branches.

HILAR AND MEDIASTINAL STRUCTURES: No identified masses or abnormal nodes.

HARDWARE: None in the chest.

UPPER ABDOMEN: No significant findings.  Limited exam.

THYROID AND OTHER SOFT TISSUES: No masses.  No adenopathy.

BONES: No acute or significant finding.

3D MIPS: Confirm above findings.

OTHER: No other significant finding.



IMPRESSION:  There are no pulmonary emboli.  There is no aortic aneurysm or dissection.  No acute fin
dings in the thorax.



COMMENT:  Quality ID # 436: Final reports with documentation of one or more dose reduction techniques
 (e.g., Automated exposure control, adjustment of the mA and/or kV according to patient size, use of 
iterative reconstruction technique)



TECHNICAL DOCUMENTATION:  JOB ID:  [id number]

 2011 [employer]- All Rights Reserved



Reading location - IP/workstation name: [email or web address]

## 2020-08-18 NOTE — EKG REPORT
SEVERITY:- OTHERWISE NORMAL ECG -

SINUS TACHYCARDIA

:

Confirmed by: Dez Fabian 18-Aug-2020 19:28:55

## 2020-08-18 NOTE — RADIOLOGY REPORT (SQ)
EXAM DESCRIPTION:  CHEST SINGLE VIEW



IMAGES COMPLETED DATE/TIME:  8/18/2020 3:54 pm



REASON FOR STUDY:  alfonso mclean



COMPARISON:  7/8/2020



EXAM PARAMETERS:  NUMBER OF VIEWS: One view.

TECHNIQUE: Single frontal radiographic view of the chest acquired.

RADIATION DOSE: NA

LIMITATIONS: None.



FINDINGS:  LUNGS AND PLEURA: No opacities, masses or pneumothorax. No pleural effusion.

MEDIASTINUM AND HILAR STRUCTURES: No masses.  Contour normal.

HEART AND VASCULAR STRUCTURES: Heart normal in size.  Normal vasculature.

BONES: No acute findings.

HARDWARE: None in the chest.

OTHER: No other significant finding.



IMPRESSION:  NO ACUTE RADIOGRAPHIC FINDING IN THE CHEST.



TECHNICAL DOCUMENTATION:  JOB ID:  [id number]

 2011 [employer]- All Rights Reserved



Reading location - IP/workstation name: [email or web address]

## 2020-08-20 ENCOUNTER — HOSPITAL ENCOUNTER (EMERGENCY)
Dept: HOSPITAL 62 - ER | Age: 60
LOS: 1 days | Discharge: HOME | End: 2020-08-21
Payer: MEDICARE

## 2020-08-20 DIAGNOSIS — R06.02: ICD-10-CM

## 2020-08-20 DIAGNOSIS — G89.4: ICD-10-CM

## 2020-08-20 DIAGNOSIS — I11.0: ICD-10-CM

## 2020-08-20 DIAGNOSIS — Z88.8: ICD-10-CM

## 2020-08-20 DIAGNOSIS — N39.0: Primary | ICD-10-CM

## 2020-08-20 DIAGNOSIS — Z88.0: ICD-10-CM

## 2020-08-20 DIAGNOSIS — Z79.899: ICD-10-CM

## 2020-08-20 DIAGNOSIS — E11.65: ICD-10-CM

## 2020-08-20 DIAGNOSIS — J45.909: ICD-10-CM

## 2020-08-20 DIAGNOSIS — I50.9: ICD-10-CM

## 2020-08-20 LAB
%HYPO/RBC NFR BLD AUTO: (no result) %
ABSOLUTE LYMPHOCYTES# (MANUAL): 1.1 10^3/UL (ref 0.5–4.7)
ABSOLUTE MONOCYTES # (MANUAL): 0.5 10^3/UL (ref 0.1–1.4)
ADD MANUAL DIFF: YES
ALBUMIN SERPL-MCNC: 4.4 G/DL (ref 3.5–5)
ALP SERPL-CCNC: 91 U/L (ref 38–126)
ANION GAP SERPL CALC-SCNC: 14 MMOL/L (ref 5–19)
ANISOCYTOSIS BLD QL SMEAR: (no result)
AST SERPL-CCNC: 23 U/L (ref 14–36)
BASOPHILS NFR BLD MANUAL: 0 % (ref 0–2)
BILIRUB SERPL-MCNC: 0.3 MG/DL (ref 0.2–1.3)
BUN SERPL-MCNC: 22 MG/DL (ref 7–20)
CALCIUM: 9.3 MG/DL (ref 8.4–10.2)
CHLORIDE SERPL-SCNC: 96 MMOL/L (ref 98–107)
CO2 SERPL-SCNC: 27 MMOL/L (ref 22–30)
DACRYOCYTES BLD QL SMEAR: (no result)
EOSINOPHIL NFR BLD MANUAL: 0 % (ref 0–6)
ERYTHROCYTE [DISTWIDTH] IN BLOOD BY AUTOMATED COUNT: 21.9 % (ref 11.5–14)
GLUCOSE SERPL-MCNC: 464 MG/DL (ref 75–110)
HCT VFR BLD CALC: 35.3 % (ref 36–47)
HGB BLD-MCNC: 11.5 G/DL (ref 12–15.5)
MCH RBC QN AUTO: 25.4 PG (ref 27–33.4)
MCHC RBC AUTO-ENTMCNC: 32.5 G/DL (ref 32–36)
MCV RBC AUTO: 78 FL (ref 80–97)
MONOCYTES % (MANUAL): 7 % (ref 3–13)
NEUTS BAND NFR BLD MANUAL: 1 % (ref 3–5)
NRBC BLD AUTO-RTO: 1 /100 WBC
OVALOCYTES BLD QL SMEAR: (no result)
PLATELET # BLD: 297 10^3/UL (ref 150–450)
PLATELET COMMENT: ADEQUATE
POIKILOCYTOSIS BLD QL SMEAR: (no result)
POLYCHROMASIA BLD QL SMEAR: SLIGHT
POTASSIUM SERPL-SCNC: 3.9 MMOL/L (ref 3.6–5)
PROT SERPL-MCNC: 7.4 G/DL (ref 6.3–8.2)
RBC # BLD AUTO: 4.52 10^6/UL (ref 3.72–5.28)
SEGMENTED NEUTROPHILS % (MAN): 77 % (ref 42–78)
TARGETS BLD QL SMEAR: (no result)
TOTAL CELLS COUNTED BLD: 100
VARIANT LYMPHS NFR BLD MANUAL: 13 % (ref 13–45)
WBC # BLD AUTO: 7.6 10^3/UL (ref 4–10.5)

## 2020-08-20 PROCEDURE — 93005 ELECTROCARDIOGRAM TRACING: CPT

## 2020-08-20 PROCEDURE — 36415 COLL VENOUS BLD VENIPUNCTURE: CPT

## 2020-08-20 PROCEDURE — 81001 URINALYSIS AUTO W/SCOPE: CPT

## 2020-08-20 PROCEDURE — 71045 X-RAY EXAM CHEST 1 VIEW: CPT

## 2020-08-20 PROCEDURE — 84484 ASSAY OF TROPONIN QUANT: CPT

## 2020-08-20 PROCEDURE — 80053 COMPREHEN METABOLIC PANEL: CPT

## 2020-08-20 PROCEDURE — 82962 GLUCOSE BLOOD TEST: CPT

## 2020-08-20 PROCEDURE — 85025 COMPLETE CBC W/AUTO DIFF WBC: CPT

## 2020-08-20 PROCEDURE — 99285 EMERGENCY DEPT VISIT HI MDM: CPT

## 2020-08-20 PROCEDURE — 93010 ELECTROCARDIOGRAM REPORT: CPT

## 2020-08-20 NOTE — ER DOCUMENT REPORT
ED Medical Screen (RME)





- General


Stated Complaint: BLOOD SUGAR PROBLEMS


Time Seen by Provider: 20 17:23


Primary Care Provider: 


KAYLEY CHILDS PA-C [Primary Care Provider] - Follow up as needed


Mode of Arrival: Wheelchair


Information source: Patient


Notes: 





Patient is a 59-year-old female states she was sent here by Dr. Cristina who is 

her pain management doctor because her blood sugars were in excess of 600.  

Patient states for the past week she has been feeling very lousy.  She is been 

getting increasing shortness of breath.  She states she checks her sugars and 

they have not been that high.  She denies having changed her diet.  She is also 

having chest pain with this.  Patient is an insulin-dependent diabetic has a 

history of lupus.  Patient was also seen here on 2020 with a complaint of 

shortness of breath was worked up and discharged home.  Her sugars at that time 

were 386 on arrival and down to 200+ on departure.





Physical examination.  Patient is a well-nourished well-developed morbidly obese

59-year-old female no apparent distress on examination.  Patient is a little 

upset because we were not informed ahead of time that she was coming.  She 

states Dr. Cristina's office was supposed to call but she was thinking she was 

going to be directly admitted.


Cardiac: Patient slightly tachycardic at 111 beats a minute.


Lungs: Bilateral breath sounds decreased throughout no rhonchi rales or wheeze 

are heard


Abdomen: Bowel sounds are present all 4 quads unable to palpate any tenderness 

to the abdominal area secondary to body habitus and sitting position in a wheel

chair.





I have greeted and performed a rapid initial assessment of this patient.  A 

comprehensive ED assessment and evaluation of the patient, analysis of test 

results and completion of the medical decision making process will be conducted 

by additional ED providers.  Dictation of this chart was performed using voice 

recognition software; therefore, there may be some unintended grammatical 

errors.


TRAVEL OUTSIDE OF THE [place] IN LAST 30 DAYS: No





- Related Data


Allergies/Adverse Reactions: 


                                        





ibuprofen [Ibuprofen] Allergy (Severe, Verified 17 00:36)


   Chest pain


Penicillins Allergy (Verified 19 14:00)


   











Past Medical History





- Past Medical History


Cardiac Medical History: Reports: Hx Congestive Heart Failure, Hx 

Hypercholesterolemia, Hx Hypertension, Hx Heart Murmur -  systolic murmur


   Denies: Hx Atrial Fibrillation, Hx Coronary Artery Disease, Hx Heart Attack, 

Hx Peripheral Vascular Disease, Hx Pulmonary Embolism


Pulmonary Medical History: Reports: Hx Asthma, Hx Bronchitis, Hx Pneumonia, Hx 

Sleep Apnea


   Denies: Hx COPD, Hx Respiratory Failure, Hx Tuberculosis


Neurological Medical History: Reports: Hx Cerebrovascular Accident - .  

Denies: Hx Seizures, Hx Parkinson's Disease


Endocrine Medical History: Reports: Hx Diabetes Mellitus Type 2.  Denies: Hx 

Graves' Disease, Hx Hyperthyroidism, Hx Hypothyroidism


Renal/ Medical History: Denies: Hx Peritoneal Dialysis


Malignancy Medical History: Denies: Hx Leukemia, Hx Lung Cancer


GI Medical History: Reports: Hx Crohn's Disease, Hx Hiatal Hernia, Hx 

Pancreatitis.  Denies: Hx Gastroesophageal Reflux Disease, Hx Irritable Bowel, 

Hx Liver Failure, Hx Ulcer


Musculoskeltal Medical History: Reports Hx Arthritis, Reports Hx Fibromyalgia, 

Denies Hx Multiple Sclerosis, Denies Hx Muscular Dystrophy, Reports Hx Systemic 

Lupus Erythematosus


Psychiatric Medical History: Reports: Hx Depression, Hx Personality Disorder


   Denies: Hx Bipolar Disorder, Hx Dementia, Hx Post Traumatic Stress Disorder, 

Hx Schizophrenia


Traumatic Medical History: Denies: Hx Fractures


Infectious Medical History: Denies: Hx HIV


Past Surgical History: Reports: Hx  Section, Hx Hysterectomy.  Denies: 

Hx Appendectomy, Hx Bowel Surgery, Hx Cholecystectomy, Hx Colostomy, Hx Coronary

 Artery Bypass Graft, Hx Gastric Bypass Surgery, Hx Herniorrhaphy, Hx 

Mastectomy, Hx Pacemaker, Hx Tonsillectomy, Hx Tubal Ligation





- Immunizations


Immunizations up to date: Yes


Hx Diphtheria, Pertussis, Tetanus Vaccination: Yes





Physical Exam





- Vital signs


Vitals: 


                                        











Temp Pulse Resp BP Pulse Ox


 


 98.5 F   111 H  18   143/79 H  97 


 


 20 16:27  20 16:27  20 16:27  20 16:27  20 16:27














Course





- Vital Signs


Vital signs: 


                                        











Temp Pulse Resp BP Pulse Ox


 


 98.5 F   111 H  18   143/79 H  97 


 


 20 16:27  20 16:27  20 16:27  20 16:27  20 16:27














- Laboratory


Laboratory results interpreted by me: 


                                        











  20





  17:28


 


POC Glucose  474 H*














Doctor's Discharge





- Discharge


Referrals: 


KAYLEY CHILDS PA-C [Primary Care Provider] - Follow up as needed

## 2020-08-20 NOTE — EKG REPORT
SEVERITY:- OTHERWISE NORMAL ECG -

SINUS TACHYCARDIA

:

Confirmed by: Dez Fabian 20-Aug-2020 18:36:20

## 2020-08-20 NOTE — RADIOLOGY REPORT (SQ)
EXAM DESCRIPTION:  CHEST SINGLE VIEW



IMAGES COMPLETED DATE/TIME:  8/20/2020 5:09 pm



REASON FOR STUDY:  Short of breath



COMPARISON:  8/18/2020



EXAM PARAMETERS:  NUMBER OF VIEWS: One view.

TECHNIQUE: Single frontal radiographic view of the chest acquired.

RADIATION DOSE: NA

LIMITATIONS: None.



FINDINGS:  LUNGS AND PLEURA: No opacities, masses or pneumothorax. No pleural effusion.

MEDIASTINUM AND HILAR STRUCTURES: No masses.  Contour normal.

HEART AND VASCULAR STRUCTURES: Heart normal in size.  Normal vasculature.

BONES: No acute findings.

HARDWARE: None in the chest.

OTHER: No other significant finding.



IMPRESSION:  NO ACUTE RADIOGRAPHIC FINDING IN THE CHEST.



TECHNICAL DOCUMENTATION:  JOB ID:  [id number]

 2011 [employer]- All Rights Reserved



Reading location - IP/workstation name: [email or web address]

## 2020-08-21 VITALS — DIASTOLIC BLOOD PRESSURE: 83 MMHG | SYSTOLIC BLOOD PRESSURE: 141 MMHG

## 2020-08-21 LAB
APPEARANCE UR: (no result)
APTT PPP: YELLOW S
BILIRUB UR QL STRIP: NEGATIVE
GLUCOSE UR STRIP-MCNC: >=500 MG/DL
KETONES UR STRIP-MCNC: NEGATIVE MG/DL
NITRITE UR QL STRIP: POSITIVE
PH UR STRIP: 5 [PH] (ref 5–9)
PROT UR STRIP-MCNC: NEGATIVE MG/DL
SP GR UR STRIP: 1.02
UROBILINOGEN UR-MCNC: NEGATIVE MG/DL (ref ?–2)

## 2020-08-21 NOTE — ER DOCUMENT REPORT
Entered by DONAVON ASHLEY SCRIBE  20 [id number] 





Acting as scribe for:ALICIA SWANSON DO





ED Blood Sugar Problem





- General


Chief Complaint: High Blood Sugar


Stated Complaint: BLOOD SUGAR PROBLEMS


Time Seen by Provider: 20 17:23


Primary Care Provider: 


KAYLEY CHILDS PA-C [Primary Care Provider] - Follow up as needed


Mode of Arrival: Wheelchair


Information source: Patient


Notes: 





This 59 year old female patient with a history of diabetes presents to the ED 

today with complaints of elevated blood sugar readings. Patient states that she 

was at her pain management clinic today and her BGL there was over 600, so she 

was sent to the ED for evaluation. Patient also reports shortness of breath and 

chest pain similar to her last visit x2 days ago. She mentions foul-smelling 

urine, but denies any other urinary symptoms. Denies fever or cough.





TRAVEL OUTSIDE OF THE [place] IN LAST 30 DAYS: No





- Related Data


Allergies/Adverse Reactions: 


                                        





ibuprofen [Ibuprofen] Allergy (Severe, Verified 17 00:36)


   Chest pain


Penicillins Allergy (Verified 19 14:00)


   








Home Medications: Enbrel, Atorvastatin, Clopidogrel, Famotidine, Lantus, 

Lisinoprile, Metformin, Metoprolol, Proair, Trazadone





Past Medical History





- General


Information source: Patient, [facility] Records





- Social History


Smoking Status: Unknown if Ever Smoked


Smoking Education Provided: No


Family History: Reviewed & Not Pertinent, CAD, DM, Hypertension


Patient has suicidal ideation: No


Patient has homicidal ideation: No





- Past Medical History


Cardiac Medical History: Reports: Hx Congestive Heart Failure, Hx 

Hypercholesterolemia, Hx Hypertension, Hx Heart Murmur -  systolic murmur


Pulmonary Medical History: Reports: Hx Asthma, Hx Bronchitis, Hx Pneumonia, Hx 

Sleep Apnea


Neurological Medical History: Reports: Hx Cerebrovascular Accident - 


Endocrine Medical History: Reports: Hx Diabetes Mellitus Type 2


GI Medical History: Reports: Hx Crohn's Disease, Hx Hiatal Hernia, Hx 

Pancreatitis


Musculoskeletal Medical History: Reports Hx Arthritis, Reports Hx Fibromyalgia, 

Reports Hx Systemic Lupus Erythematosus


Psychiatric Medical History: Reports: Hx Depression, Hx Personality Disorder


Past Surgical History: Reports: Hx  Section, Hx Hysterectomy





- Immunizations


Immunizations up to date: Yes


Hx Diphtheria, Pertussis, Tetanus Vaccination: Yes


Hx Pneumococcal Vaccination: 08





Review of Systems





- Review of Systems


Constitutional: See HPI, Other - elevated bgl.  denies: Fever


EENT: No symptoms reported


Cardiovascular: See HPI, Chest pain


Respiratory: See HPI, Short of breath.  denies: Cough


Gastrointestinal: No symptoms reported


Genitourinary: See HPI, Other - Foul-smelling urine.  denies: Burning, Dysuria, 

Hematuria


Female Genitourinary: No symptoms reported


Musculoskeletal: No symptoms reported


Skin: No symptoms reported


Hematologic/Lymphatic: No symptoms reported


Neurological/Psychological: No symptoms reported


-: Yes All other systems reviewed and negative





Physical Exam





- Vital signs


Vitals: 


                                        











Temp Pulse Resp BP Pulse Ox


 


 98.5 F   111 H  18   143/79 H  97 


 


 20 16:27  20 16:27  20 16:27  20 16:27  20 16:27














- General


General appearance: Alert


In distress: None





- HEENT


Head: Normocephalic, Atraumatic


Eyes: Normal


Pupils: PERRL





- Respiratory


Respiratory status: No respiratory distress


Chest status: Nontender


Breath sounds: Normal


Chest palpation: Normal





- Cardiovascular


Rhythm: Regular


Heart sounds: Normal auscultation


Murmur: No


Friction rub: No


Gallop: None auscultated





- Abdominal


Inspection: Morbidly Obese


Distension: No distension


Bowel sounds: Normal


Tenderness: Tender - Mild generalized tenderness to palpation, Other - Abdomen 

soft


Organomegaly: No organomegaly





- Back


Back: Normal, Nontender





- Extremities


General upper extremity: Normal inspection


General lower extremity: Edema - Trace peripheral edema bilaterally





- Neurological


Neuro grossly intact: Yes


Cognition: Normal


Orientation: AAOx4


[place] Coma Scale Eye Opening: Spontaneous


Rao Coma Scale Verbal: Oriented


[place] Coma Scale Motor: Obeys Commands


Rao Coma Scale Total: 15


Speech: Normal


Motor strength normal: LUE, RUE, LLE, RLE


Sensory: Normal





- Psychological


Associated symptoms: Normal affect, Normal mood





- Skin


Skin Temperature: Warm


Skin Moisture: Dry


Skin Color: Normal





Course





- Re-evaluation


Re-evalutation: 





20 01:37


MDM   59 year old female - morbidly obese with DM and chronic pain - PCP is 

Bundle - is here due to being referred from pain management due to FSBS over 600

 there.  FSBS 300's when I see her.  Playing game on phone and watching tv and 

nontoxic. Hurts everywhere a bit but not severely and looks nontoxic 








- Vital Signs


Vital signs: 


                                        











Temp Pulse Resp BP Pulse Ox


 


 98.5 F   111 H  18   143/79 H  97 


 


 20 16:27  20 16:27  20 16:27  20 16:27  20 16:27














- Laboratory


Result Diagrams: 


                                 20 17:50





                                 20 17:50


Laboratory results interpreted by me: 


                                        











  20





  17:28 17:50 17:50


 


Hgb   11.5 L 


 


Hct   35.3 L 


 


MCV   78 L 


 


MCH   25.4 L 


 


RDW   21.9 H 


 


Band Neutrophils %   1 L 


 


Chloride    96 L


 


BUN    22 H


 


Est GFR ( Amer)    58 L


 


Est GFR (MDRD) Non-Af    48 L


 


Glucose    464 H*


 


POC Glucose  474 H*  


 


Urine Glucose (UA)   


 


Urine Nitrite   


 


Ur Leukocyte Esterase   














  20





  23:13 02:11


 


Hgb  


 


Hct  


 


MCV  


 


MCH  


 


RDW  


 


Band Neutrophils %  


 


Chloride  


 


BUN  


 


Est GFR ( Amer)  


 


Est GFR (MDRD) Non-Af  


 


Glucose  


 


POC Glucose  354 H 


 


Urine Glucose (UA)   >=500 H


 


Urine Nitrite   POSITIVE H


 


Ur Leukocyte Esterase   LARGE H














- EKG Interpretation by Me


EKG shows normal: Sinus rhythm


Rate: Tachycardia


Rhythm: NSR - Sinus Tachy 109 BPM Nl axis No st elevation or depression 

repolarization abnormality





Discharge





- Discharge


Clinical Impression: 


Chronic pain


Qualifiers:


 Chronic pain type: chronic pain syndrome Qualified Code(s): G89.4 - Chronic 

pain syndrome





Diabetes mellitus


Qualifiers:


 Diabetes mellitus type: type 2 Diabetes mellitus long term insulin use: 

unspecified long term insulin use status Diabetes mellitus complication status: 

with other specified complication Qualified Code(s): E11.69 - Type 2 diabetes 

mellitus with other specified complication





UTI (urinary tract infection)


Qualifiers:


 Urinary tract infection type: site unspecified Hematuria presence: with 

hematuria Qualified Code(s): N39.0 - Urinary tract infection, site not specified





Condition: Stable


Disposition: HOME, SELF-CARE


Instructions:  Diabetes ([facility]), Control of Diabetes During Illness ([facility])


Additional Instructions: 


Call Dr. Marcia smith today for follow up. Take your medicine as directed.  

Return here for chest pain, shortness of breath or any other problems or 

concerns. Your [facility]. 


Prescriptions: 


Cephalexin Monohydrate [Keflex 500 mg Capsule] 500 mg PO TID #30 capsule


Referrals: 


KAYLEY CHILDS PA-C [Primary Care Provider] - Follow up as needed





I personally performed the services described in the documentation, reviewed and

edited the documentation which was dictated to the scribe in my presence, and it

accurately records my words and actions.

## 2020-12-18 ENCOUNTER — HOSPITAL ENCOUNTER (OUTPATIENT)
Dept: HOSPITAL 62 - RAD | Age: 60
End: 2020-12-18
Attending: PHYSICIAN ASSISTANT
Payer: MEDICARE

## 2020-12-18 DIAGNOSIS — R10.30: ICD-10-CM

## 2020-12-18 DIAGNOSIS — K76.0: Primary | ICD-10-CM

## 2020-12-18 PROCEDURE — 74178 CT ABD&PLV WO CNTR FLWD CNTR: CPT

## 2020-12-18 NOTE — RADIOLOGY REPORT (SQ)
EXAM DESCRIPTION:  CT ABD/PELVIS COMBO



IMAGES COMPLETED DATE/TIME:  12/18/2020 10:39 am



REASON FOR STUDY:  (R10.30)LOWER ABDOMINAL PAIN, UNSPECIFIED R10.30  LOWER ABDOMINAL PAIN, UNSPECIFIE
D



COMPARISON:  None.



TECHNIQUE:  CT scan of the abdomen and pelvis performed with and without intravenous contrast, and wi
thout oral contrast. Contrasted imaging performed helical scanning technique and dynamic intravenous 
contrast injection. Images reviewed with lung, soft tissue, and bone windows. Reconstructed coronal a
nd sagittal MPR images reviewed. Delayed images for evaluation of the urinary system also acquired. A
ll images stored on PACS.

All CT scanners at this facility use dose modulation, iterative reconstruction, and/or weight based d
osing when appropriate to reduce radiation dose to as low as reasonably achievable (ALARA).

[facility]: Dose Right  [facility]: CareDose    [facility]: Dose Right    [facility]: Teradose 4D    [facility]: Smart Technologies



CONTRAST TYPE AND DOSE:  contrast/concentration: Isovue 350.00 mmol/ml; Total Contrast Delivered: 100
.0 ml; Total Saline Delivered: 37.0 ml



RENAL FUNCTION:  Creatinine 1.5



RADIATION DOSE:  CT Rad equipment meets quality standard of care and radiation dose reduction techniq
ues were employed. CTDIvol: 26.4 - 30.6 mGy. DLP: 4965 mGy-cm. .



LIMITATIONS:  None.



FINDINGS:  NON-CONTRASTED IMAGING: As below

POST-CONTRASTED IMAGING:

LOWER CHEST: No significant findings. No nodules or infiltrates.

LIVER: Normal size. No masses.  No dilated ducts.  Decreased attenuation of the hepatic parenchyma co
mpatible with steatosis.

SPLEEN: Normal size. No focal lesions.

PANCREAS: No masses. No significant calcifications. No adjacent inflammation or peripancreatic fluid 
collections. Pancreatic duct not dilated.

GALLBLADDER: No identified stones by CT criteria. No inflammatory changes to suggest cholecystitis.

ADRENAL GLANDS: No significant masses or asymmetry.

RIGHT KIDNEY AND URETER: No solid masses.   No significant calcifications.   No hydronephrosis or hyd
roureter.

LEFT KIDNEY AND URETER: No solid masses.   No significant calcifications.   No hydronephrosis or hydr
oureter.

AORTA AND VESSELS: No aneurysm. No dissection. Renal arteries, SMA, celiac without stenosis.

RETROPERITONEUM: No retroperitoneal adenopathy, hemorrhage or masses.

BOWEL AND PERITONEAL CAVITY: No evidence of intestinal obstruction.  No focal bowel wall thickening. 
 Few scattered colonic diverticular.  Stool throughout the colon.

APPENDIX: Incompletely visualized.

PELVIS: Decompressed urinary bladder.  No pelvic free fluid, adenopathy or mass.

ABDOMINAL WALL: No mass.  No hernia.  Obesity.

BONES: No acute bony abnormality.  No suspicious lytic or blastic osseous lesions.  Lower lumbar face
t arthropathy.  Vacuum disc phenomenon at L3-4.

OTHER: No other significant finding.



IMPRESSION:  Hepatic steatosis.

No evidence of acute intra-abdominal/pelvic process.



TECHNICAL DOCUMENTATION:  JOB ID:  [id number]

Quality ID # [id number]: Final reports with documentation of one or more dose reduction techniques (e.g., Au
tomated exposure control, adjustment of the mA and/or kV according to patient size, use of iterative 
reconstruction technique)

 2011 [employer]- All Rights Reserved



Reading location - IP/workstation name: [email or web address]

## 2021-02-13 NOTE — RADIOLOGY REPORT (SQ)
EXAM DESCRIPTION:  CHEST 2 VIEWS



COMPLETED DATE/TIME:  9/15/2018 3:35 pm



REASON FOR STUDY:  short of breath



COMPARISON:  Chest x-ray 6/18/2017, 4/9/2018.



EXAM PARAMETERS:  NUMBER OF VIEWS: two views

TECHNIQUE: Digital Frontal and Lateral radiographic views of the chest acquired.

RADIATION DOSE: NA

LIMITATIONS: none



FINDINGS:  LUNGS AND PLEURA: No consolidation, pneumothorax or pleural effusion.

MEDIASTINUM AND HILAR STRUCTURES: No masses or contour abnormalities.

HEART AND VASCULAR STRUCTURES: Heart normal size.  No evidence for failure.

BONES: No acute findings.

HARDWARE: None in the chest.



IMPRESSION:  NO ACUTE RADIOGRAPHIC FINDING IN THE CHEST.



TECHNICAL DOCUMENTATION:  JOB ID:  [id number]

OH-64

 2011 [employer]- All Rights Reserved



Reading location - IP/workstation name: [email or web address] (type 2 diabetes mellitus)

## 2023-02-07 NOTE — RADIOLOGY REPORT (SQ)
EXAM DESCRIPTION: 



US EXTREMITY VEINS UNILATERAL



COMPLETED DATE/TME:  07/11/2019 17:31



CLINICAL HISTORY: 



58 years, Female, left lower EXAM DESCRIPTION:



CLINICAL HISTORY:  58 years  Female  left lower



COMPARISON:  None.



TECHNIQUE:  Duplex and color Doppler imaging performed to

evaluate the extremity deep venous structures. Compression

imaging and augmentation imaging performed. 



FINDINGS: Swelling below the left knee precludes evaluation

inferior to the knee.



No thrombus is identified in the deep venous structures imaged.



There is normal flow, compressibility, and augmentation

throughout.



IMPRESSION:

Limited exam.

No DVT is identified. Subjective:     Patient ID: Basia RENEE is a 73 y.o. female.    Chief Complaint: Follow-up (Tinea pedis f/u, pre-diabetic pt wears tennis shoes, PCP Dr. Jefferson last seen 10-6-22)    HPI: This 73 year old female presents today for follow up of fungus. Patient states she has been applying the topical compound creams as instructed with more relief for the itching but still has the cracks in heels. Patient states she was evaluated by Dr. Salinas(dermatology) and diagnosed with Psoriasis. Patient denies any injury to toe. Patient denies other complaints at this time.    Patient Active Problem List   Diagnosis    GERD (gastroesophageal reflux disease)    Hypertension    Cataract       Medication List with Changes/Refills   Current Medications    AMLODIPINE (NORVASC) 5 MG TABLET    Take 5 mg by mouth.    CHOLECALCIFEROL, VITAMIN D3, (VITAMIN D3) 50 MCG (2,000 UNIT) CAP CAPSULE    Take by mouth.    HYDROCHLOROTHIAZIDE (MICROZIDE) 12.5 MG CAPSULE    Take 12.5 mg by mouth.    SIMVASTATIN (ZOCOR) 5 MG TABLET    Take 5 mg by mouth.       Review of patient's allergies indicates:   Allergen Reactions    Tree and shrub pollen Other (See Comments)     .       History reviewed. No pertinent surgical history.    Family History   Problem Relation Age of Onset    Hypertension Mother     Hypertension Father        Social History     Socioeconomic History    Marital status:    Tobacco Use    Smoking status: Never     Passive exposure: Never    Smokeless tobacco: Never   Substance and Sexual Activity    Alcohol use: Not Currently    Drug use: Never       There were no vitals filed for this visit.    Review of Systems   Constitutional:  Negative for chills and fever.   Cardiovascular: Negative.    Gastrointestinal:  Negative for diarrhea, nausea and vomiting.   Skin:  Positive for rash.   Neurological: Negative.    Endo/Heme/Allergies: Negative.    Psychiatric/Behavioral: Negative.         Objective:      PHYSICAL EXAM: Apperance:  Alert and orient in no distress,well developed, and with good attention to grooming and body habits  Patient presents ambulating in tennis shoes.   Lower Extremity Exam  VASCULAR: Dorsalis pedis pulses 2/4 bilateral and Posterior Tibial pulses 2/4 bilateral. Capillary fill time <4 seconds bilateral. No edema observed bilateral. Skin temperature of the lower extremities is warm to warm, proximal to distal. Hair growth WNL bilateral.  DERMATOLOGICAL: No skin rash, subcutaneous nodules, lesions or ulcers observed. Decreased dry and scaly skin noted plantarly bilateral in moccasin distribution. Decreased dry skin fissures noted to bilateral heels. Webspaces 1,2,3 bilateral are clean, dry and without evidence of break in skin integrity. Webspaces 4 bilateral minimal maceration noted. Nails thickened, and discolored with subungual debris.   NEUROLOGICAL: Light touch, sharp-dull, proprioception all present and equal bilaterally.    MUSCULOSKELETAL: Muscle strength is 5/5 for foot inverters, everters, plantarflexors, and dorsiflexors. Muscle tone is normal.         Assessment:       Encounter Diagnosis   Name Primary?    Palmoplantar pustular psoriasis Yes             Plan:   Palmoplantar pustular psoriasis      I counseled the patient on her conditions, regarding findings of my examination, my impressions, and usual treatment plan.   Discussed evaluation of dermatology. Patient has failed topical therapy with calcipotriene, fluocinolone, and urea cream.  Discussed start of biologic therapy.  Patient is amenable.  And pending labs plans to start Cosentyx.  Patient to return in 4 months or sooner if needed.       Angelia Ndiaye DPM  [facility]